# Patient Record
Sex: MALE | Race: WHITE | NOT HISPANIC OR LATINO | Employment: OTHER | URBAN - NONMETROPOLITAN AREA
[De-identification: names, ages, dates, MRNs, and addresses within clinical notes are randomized per-mention and may not be internally consistent; named-entity substitution may affect disease eponyms.]

---

## 2017-04-05 ENCOUNTER — ALLSCRIPTS OFFICE VISIT (OUTPATIENT)
Dept: FAMILY MEDICINE CLINIC | Facility: CLINIC | Age: 77
End: 2017-04-05
Payer: MEDICARE

## 2017-06-23 ENCOUNTER — APPOINTMENT (OUTPATIENT)
Dept: LAB | Facility: CLINIC | Age: 77
End: 2017-06-23
Payer: MEDICARE

## 2017-06-23 DIAGNOSIS — N52.9 MALE ERECTILE DYSFUNCTION: ICD-10-CM

## 2017-06-23 DIAGNOSIS — C61 MALIGNANT NEOPLASM OF PROSTATE (HCC): ICD-10-CM

## 2017-06-23 LAB — PSA SERPL-MCNC: <0.1 NG/ML (ref 0–4)

## 2017-06-23 PROCEDURE — 36415 COLL VENOUS BLD VENIPUNCTURE: CPT

## 2017-06-23 PROCEDURE — 84153 ASSAY OF PSA TOTAL: CPT

## 2017-07-07 ENCOUNTER — ALLSCRIPTS OFFICE VISIT (OUTPATIENT)
Dept: OTHER | Facility: OTHER | Age: 77
End: 2017-07-07

## 2018-01-13 VITALS
SYSTOLIC BLOOD PRESSURE: 114 MMHG | DIASTOLIC BLOOD PRESSURE: 70 MMHG | WEIGHT: 183 LBS | BODY MASS INDEX: 26.2 KG/M2 | HEART RATE: 72 BPM | HEIGHT: 70 IN

## 2018-06-27 ENCOUNTER — APPOINTMENT (OUTPATIENT)
Dept: LAB | Facility: HOSPITAL | Age: 78
End: 2018-06-27
Attending: UROLOGY
Payer: MEDICARE

## 2018-06-27 DIAGNOSIS — C61 MALIGNANT NEOPLASM OF PROSTATE (HCC): ICD-10-CM

## 2018-06-27 LAB — PSA SERPL-MCNC: <0.1 NG/ML (ref 0–4)

## 2018-06-27 PROCEDURE — 36415 COLL VENOUS BLD VENIPUNCTURE: CPT

## 2018-06-27 PROCEDURE — 84153 ASSAY OF PSA TOTAL: CPT

## 2018-07-07 DIAGNOSIS — C61 MALIGNANT NEOPLASM OF PROSTATE (HCC): ICD-10-CM

## 2018-07-16 ENCOUNTER — OFFICE VISIT (OUTPATIENT)
Dept: UROLOGY | Facility: CLINIC | Age: 78
End: 2018-07-16
Payer: MEDICARE

## 2018-07-16 VITALS
BODY MASS INDEX: 26.48 KG/M2 | HEART RATE: 66 BPM | DIASTOLIC BLOOD PRESSURE: 74 MMHG | WEIGHT: 185 LBS | HEIGHT: 70 IN | SYSTOLIC BLOOD PRESSURE: 132 MMHG

## 2018-07-16 DIAGNOSIS — C61 PROSTATE CANCER (HCC): Primary | ICD-10-CM

## 2018-07-16 PROCEDURE — 99214 OFFICE O/P EST MOD 30 MIN: CPT | Performed by: UROLOGY

## 2018-07-16 RX ORDER — ATENOLOL 25 MG/1
TABLET ORAL DAILY
COMMUNITY
Start: 2018-07-08 | End: 2021-09-10 | Stop reason: SDUPTHER

## 2018-07-16 RX ORDER — ROSUVASTATIN CALCIUM 5 MG/1
TABLET, COATED ORAL
COMMUNITY
Start: 2018-07-09 | End: 2021-09-10 | Stop reason: SDUPTHER

## 2018-07-16 RX ORDER — ASPIRIN 325 MG
81 TABLET ORAL
COMMUNITY
End: 2022-04-22 | Stop reason: ALTCHOICE

## 2018-07-16 NOTE — PROGRESS NOTES
Referring Physician: Bonifacio Duvall PA-C  A copy of this note was sent to the referring physician  Diagnoses and all orders for this visit:    Prostate cancer ([facility] [address] )  -     PSA, Total Screen; Future    Other orders  -     aspirin 325 mg tablet; Take 325 mg by mouth  -     atenolol (TENORMIN) 25 mg tablet;   -     rosuvastatin (CRESTOR) 5 mg tablet; TAKE 1 TABLET DAILY AT BEDTIME            Assessment and plan:       1  Prostate cancer  -robotic prostatectomy 2009 (Dr Josiephine Kocher)  -JANESSA    Plan continue annual PSA surveillance  Counseling provided on healthy diet and lifestyle choices, particularly with implications for continued prostate cancer survivor ship    Genaro Mathtews MD      Chief Complaint     Prostate cancer follow-up      History of Present Illness     Junior Bowers is a 69 y o  male who returns in follow-up for prostate cancer  He has done very well since his prostatectomy in 2009 with Dr Zehra Stoddard  He has been free of recurrent disease since that time has not required any adjuvant therapy  He has had a good functional response  He describes minimal leakage with heavy activity  Nevertheless he remains heavily active  During the summer he bikes 3 days a week with his wife  He has no hematuria or UTIs or dysuria  Detailed Urologic History     - please refer to HPI    Review of Systems     Review of Systems   Constitutional: Negative for activity change and fatigue  HENT: Negative for congestion  Eyes: Negative for visual disturbance  Respiratory: Negative for shortness of breath and wheezing  Cardiovascular: Negative for chest pain and leg swelling  Gastrointestinal: Negative for abdominal pain  Endocrine: Negative for polyuria  Genitourinary: Negative for dysuria, flank pain, hematuria and urgency  Mild stress urinary incontinence   Musculoskeletal: Negative for back pain  Allergic/Immunologic: Negative for immunocompromised state     Neurological: Negative for dizziness and numbness  Psychiatric/Behavioral: Negative for dysphoric mood  All other systems reviewed and are negative  AUA SYMPTOM SCORE      Most Recent Value   AUA SYMPTOM SCORE   How often have you had a sensation of not emptying your bladder completely after you finished urinating? 0   How often have you had to urinate again less than two hours after you finished urinating? 0   How often have you found you stopped and started again several times when you urinate?  0   How often have you found it difficult to postpone urination? 0   How often have you had a weak urinary stream?  0   How often have you had to push or strain to begin urination? 0   How many times did you most typically get up to urinate from the time you went to bed at night until the time you got up in the morning? 2   Quality of Life: If you were to spend the rest of your life with your urinary condition just the way it is now, how would you feel about that?  2   AUA SYMPTOM SCORE  2            Allergies     No Known Allergies    Physical Exam     Physical Exam   Constitutional: He is oriented to person, place, and time  He appears well-developed and well-nourished  No distress  HENT:   Head: Normocephalic and atraumatic  Eyes: EOM are normal    Neck: Normal range of motion  Cardiovascular:   Negative lower extremity edema   Pulmonary/Chest: Effort normal and breath sounds normal    Abdominal: Soft  Genitourinary:   Genitourinary Comments: Negative CVA tenderness   Musculoskeletal: Normal range of motion  Neurological: He is alert and oriented to person, place, and time  Skin: Skin is warm  Psychiatric: He has a normal mood and affect   His behavior is normal            Vital Signs  Vitals:    07/16/18 0912   BP: 132/74   BP Location: Left arm   Patient Position: Sitting   Cuff Size: Adult   Pulse: 66   Weight: 83 9 kg (185 lb)   Height: 5' 10" (1 778 m)         Current Medications       Current Outpatient Prescriptions:     aspirin 325 mg tablet, Take 325 mg by mouth, Disp: , Rfl:     atenolol (TENORMIN) 25 mg tablet, , Disp: , Rfl:     rosuvastatin (CRESTOR) 5 mg tablet, TAKE 1 TABLET DAILY AT BEDTIME, Disp: , Rfl:       Active Problems     There is no problem list on file for this patient  Past Medical History     Past Medical History:   Diagnosis Date    Erectile dysfunction     Heart disease     Prostate cancer ([facility] [address] )          Surgical History     Past Surgical History:   Procedure Laterality Date    CAROTID STENT      HERNIA REPAIR      PROSTATECTOMY           Family History     Family History   Problem Relation Age of Onset    Prostate cancer Father          Social History     Social History     History   Smoking Status    Never Smoker   Smokeless Tobacco    Never Used         Pertinent Lab Values     No results found for: CREATININE    Lab Results   Component Value Date    PSA <0 1 06/27/2018    PSA <0 1 06/23/2017    PSA <0 1 07/07/2016       @RESULTRCNT(1H])@      Pertinent Imaging      - n/a    Portions of the record may have been created with voice recognition software   Occasional wrong word or "sound a like" substitutions may have occurred due to the inherent limitations of voice recognition software   Read the chart carefully and recognize, using context, where substitutions have occurred

## 2018-10-20 ENCOUNTER — APPOINTMENT (EMERGENCY)
Dept: RADIOLOGY | Facility: HOSPITAL | Age: 78
End: 2018-10-20
Payer: MEDICARE

## 2018-10-20 ENCOUNTER — HOSPITAL ENCOUNTER (EMERGENCY)
Facility: HOSPITAL | Age: 78
Discharge: HOME | End: 2018-10-20
Attending: EMERGENCY MEDICINE
Payer: MEDICARE

## 2018-10-20 ENCOUNTER — APPOINTMENT (EMERGENCY)
Dept: CT IMAGING | Facility: HOSPITAL | Age: 78
End: 2018-10-20
Payer: MEDICARE

## 2018-10-20 VITALS
OXYGEN SATURATION: 93 % | SYSTOLIC BLOOD PRESSURE: 134 MMHG | TEMPERATURE: 98 F | DIASTOLIC BLOOD PRESSURE: 68 MMHG | RESPIRATION RATE: 23 BRPM | HEART RATE: 74 BPM

## 2018-10-20 DIAGNOSIS — S29.012A MUSCLE STRAIN OF UPPER BACK: ICD-10-CM
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
## 2018-10-20 DIAGNOSIS — S29.011A CHEST WALL MUSCLE STRAIN: ICD-10-CM

## 2018-10-20 DIAGNOSIS — S60.222A CONTUSION OF LEFT HAND: ICD-10-CM

## 2018-10-20 DIAGNOSIS — S81.811A LACERATION OF RIGHT LOWER LEG: ICD-10-CM

## 2018-10-20 DIAGNOSIS — S09.90XA MINOR HEAD INJURY WITHOUT LOSS OF CONSCIOUSNESS: ICD-10-CM

## 2018-10-20 PROCEDURE — 99284 EMERGENCY DEPT VISIT MOD MDM: CPT

## 2018-10-20 PROCEDURE — 73130 X-RAY EXAM OF HAND: CPT

## 2018-10-20 PROCEDURE — 72125 CT NECK SPINE W/O DYE: CPT

## 2018-10-20 PROCEDURE — 73590 X-RAY EXAM OF LOWER LEG: CPT

## 2018-10-20 PROCEDURE — 71046 X-RAY EXAM CHEST 2 VIEWS: CPT

## 2018-10-20 PROCEDURE — 73110 X-RAY EXAM OF WRIST: CPT

## 2018-10-20 PROCEDURE — 72128 CT CHEST SPINE W/O DYE: CPT

## 2018-10-20 PROCEDURE — 93005 ELECTROCARDIOGRAM TRACING: CPT

## 2018-10-20 PROCEDURE — 70450 CT HEAD/BRAIN W/O DYE: CPT

## 2018-10-20 RX ORDER — LIDOCAINE HYDROCHLORIDE AND EPINEPHRINE 10; 10 MG/ML; UG/ML
5 INJECTION, SOLUTION INFILTRATION; PERINEURAL ONCE
Status: COMPLETED | OUTPATIENT
Start: 2018-10-20 | End: 2018-10-20

## 2018-10-20 RX ORDER — LIDOCAINE 50 MG/G
1 PATCH TOPICAL DAILY PRN
Qty: 6 PATCH | Refills: 0 | Status: SHIPPED | OUTPATIENT
Start: 2018-10-20 | End: 2021-03-10 | Stop reason: ALTCHOICE

## 2018-10-20 RX ORDER — CYCLOBENZAPRINE HCL 10 MG
10 TABLET ORAL ONCE
Status: COMPLETED | OUTPATIENT
Start: 2018-10-20 | End: 2018-10-20

## 2018-10-20 RX ORDER — LIDOCAINE 50 MG/G
1 PATCH TOPICAL ONCE
Status: DISCONTINUED | OUTPATIENT
Start: 2018-10-20 | End: 2018-10-20 | Stop reason: HOSPADM

## 2018-10-20 RX ORDER — BACITRACIN, NEOMYCIN, POLYMYXIN B 400; 3.5; 5 [USP'U]/G; MG/G; [USP'U]/G
1 OINTMENT TOPICAL ONCE
Status: COMPLETED | OUTPATIENT
Start: 2018-10-20 | End: 2018-10-20

## 2018-10-20 RX ORDER — CYCLOBENZAPRINE HCL 10 MG
10 TABLET ORAL 3 TIMES DAILY PRN
Qty: 10 TABLET | Refills: 0 | Status: SHIPPED | OUTPATIENT
Start: 2018-10-20 | End: 2018-10-30 | Stop reason: SDUPTHER

## 2018-10-20 RX ORDER — ACETAMINOPHEN 325 MG/1
650 TABLET ORAL ONCE
Status: DISCONTINUED | OUTPATIENT
Start: 2018-10-20 | End: 2018-10-20 | Stop reason: HOSPADM

## 2018-10-20 RX ADMIN — LIDOCAINE 1 PATCH: 50 PATCH CUTANEOUS at 17:00

## 2018-10-20 RX ADMIN — CYCLOBENZAPRINE HYDROCHLORIDE 10 MG: 10 TABLET, FILM COATED ORAL at 17:00

## 2018-10-20 RX ADMIN — NEOMYCIN AND POLYMYXIN B SULFATES AND BACITRACIN ZINC 1 SMALL APPLICATION: 400; 3.5; 5 OINTMENT TOPICAL at 18:36

## 2018-10-20 RX ADMIN — LIDOCAINE HYDROCHLORIDE AND EPINEPHRINE 5 ML: 10; 10 INJECTION, SOLUTION INFILTRATION; PERINEURAL at 17:02

## 2018-10-20 NOTE — ED PROVIDER NOTES
History  Chief Complaint   Patient presents with    Fall     pt reports falling head first into a creeek after falling 8 feet  lower neck pain and upper back pain and chest pain  pt is on 325mg  ASA everyday  Patient is a 69year old male with past medical history of coronary artery disease status post stent, hypertension, hyperlipidemia, prostate cancer status post prostatectomy, presents to the emergency department after he fell from 8 feet off of a diving board  Patient reports he was working outside and was standing on the diving board when it broke and he fell about 8 feet into a creek  Patient states he fell head 1st and did strike the back of his head  He denies loss of consciousness  Bystanders helped him up immediately  He reports having pain in his lower neck/upper back since this started which is worsened when he tries to bend forward  He also reports several hours after the fall which occurred earlier today, he was experiencing some pain all across his chest and feels as though it might be a muscle strain  He also reports sustaining a laceration that bled heavily on his right anterior shin  Bleeding has since been controlled  Denies significant pain under the laceration and denies any extremity weakness or paresthesia  He also reports some mild abrasions on his scalp and on his left hand  He took 3 ibuprofen prior to coming to the ED  He normally takes aspirin 324 mg daily  He denies being on any other blood thinners or anticoagulant agents  Rest of review of systems is negative    Denies fever, shaking chills, headache, dizziness or near syncope, change in vision or hearing, tinnitus, photophobia, recent URI symptoms, cough, hemoptysis, palpitations, dyspnea, pain with inhalation, abdominal pain or distention, nausea, vomiting, change in bowel habits, blood per rectum or melena, dysuria, frequency, hematuria or difficulty urinating, flank pain, low back pain, skin rash or color change, extremity weakness or paresthesia or other focal neurologic deficits  Patient up-to-date with tetanus  History provided by:  Patient   used: No    Fall   Associated symptoms: back pain, chest pain and neck pain    Associated symptoms: no abdominal pain, no headaches, no hearing loss, no nausea, no seizures and no vomiting        Prior to Admission Medications   Prescriptions Last Dose Informant Patient Reported? Taking?   aspirin 325 mg tablet  Self Yes No   Sig: Take 325 mg by mouth   atenolol (TENORMIN) 25 mg tablet  Self Yes No   rosuvastatin (CRESTOR) 5 mg tablet  Self Yes No   Sig: TAKE 1 TABLET DAILY AT BEDTIME      Facility-Administered Medications: None       Past Medical History:   Diagnosis Date    Erectile dysfunction     Heart disease     Prostate cancer ([facility] [address] )        Past Surgical History:   Procedure Laterality Date    CAROTID STENT      HERNIA REPAIR      PROSTATECTOMY         Family History   Problem Relation Age of Onset    Prostate cancer Father      I have reviewed and agree with the history as documented  Social History   Substance Use Topics    Smoking status: Never Smoker    Smokeless tobacco: Never Used    Alcohol use No        Review of Systems   Constitutional: Negative for appetite change, chills, fatigue and fever  HENT: Negative for congestion, ear pain, hearing loss, rhinorrhea, sore throat and tinnitus  Eyes: Negative for photophobia, pain and visual disturbance  Respiratory: Negative for cough, chest tightness, shortness of breath and wheezing  Cardiovascular: Positive for chest pain  Negative for palpitations  Gastrointestinal: Negative for abdominal pain, constipation, diarrhea, nausea and vomiting  Genitourinary: Negative for difficulty urinating, dysuria, flank pain, frequency and hematuria  Musculoskeletal: Positive for back pain and neck pain  Negative for neck stiffness  Skin: Positive for wound   Negative for color change, pallor and rash  Allergic/Immunologic: Negative for immunocompromised state  Neurological: Negative for dizziness, seizures, syncope, facial asymmetry, speech difficulty, weakness, light-headedness, numbness and headaches  Hematological: Negative for adenopathy  Bruises/bleeds easily  Psychiatric/Behavioral: Negative for confusion and decreased concentration  All other systems reviewed and are negative  Physical Exam  Physical Exam   Constitutional: He is oriented to person, place, and time  He appears well-developed and well-nourished  No distress  HENT:   Head: Normocephalic  Right Ear: External ear normal    Left Ear: External ear normal    Nose: Nose normal    Mouth/Throat: Oropharynx is clear and moist  No oropharyngeal exudate  Superficial abrasion on the top of the scalp  There is a small to moderate size hematoma on the left parietal/occipital scalp  No scalp laceration  Eyes: Pupils are equal, round, and reactive to light  Conjunctivae and EOM are normal    Neck: Neck supple  No JVD present  No tracheal deviation present  Decreased range of motion with head turning to the left secondary to pain in his neck and upper back  Otherwise range of motion of the neck intact  Cardiovascular: Normal rate, regular rhythm, normal heart sounds and intact distal pulses  Exam reveals no gallop and no friction rub  No murmur heard  Pulmonary/Chest: Effort normal and breath sounds normal  No respiratory distress  He has no wheezes  He has no rales  He exhibits tenderness  Mild central/left lower chest wall tenderness anteriorly  No chest wall crepitus  Chest wall stable to compression  Abdominal: Soft  Bowel sounds are normal  He exhibits no distension  There is no tenderness  There is no rebound and no guarding  Musculoskeletal: Normal range of motion  He exhibits tenderness  He exhibits no edema  SPINE: + Midline lower C-spine and upper T-spine tenderness    No step-offs  No lumbar spine tenderness  No significant paravertebral muscle tenderness  LEFT UPPER EXTREMITY:  Mild tenderness over the base of the left thumb  There is superficial abrasion over the thumb and thenar eminence  No significant tenderness over the snuffbox and no pain with axial loading of the thumb  RIGHT LOWER EXTREMITY:  Mild tenderness over the anterior and distal tibia around the site of the laceration which is described below (see SKIN exam for laceration details)  Pelvis stable and nontender  All extremities are neurovascularly intact  Neurological: He is alert and oriented to person, place, and time  No cranial nerve deficit  No gross motor or sensory deficits  5/5 strength throughout  Normal gait  Normal speech  Skin: Skin is warm and dry  No rash noted  He is not diaphoretic  No pallor  2 cm vertical and superficial laceration over the right anterior shin  Additional 1 cm above the laceration is abrasion but no significant skin breakdown  No active bleeding  Psychiatric: He has a normal mood and affect  His behavior is normal    Nursing note and vitals reviewed        Vital Signs  ED Triage Vitals [10/20/18 1610]   Temperature Pulse Respirations Blood Pressure SpO2   98 °F (36 7 °C) 78 16 (!) 175/83 97 %      Temp Source Heart Rate Source Patient Position - Orthostatic VS BP Location FiO2 (%)   Oral Monitor Lying -- --      Pain Score       --         Vitals:    10/20/18 1610 10/20/18 1613 10/20/18 1652 10/20/18 1744   BP: (!) 175/83 (!) 179/77 (!) 175/79 134/68   Pulse: 78 82 71 73   Resp: 16 16 16 16   Temp: 98 °F (36 7 °C)      TempSrc: Oral      SpO2: 97% 93% 96% 94%     Visual Acuity      ED Medications  Medications   acetaminophen (TYLENOL) tablet 650 mg (0 mg Oral Hold 10/20/18 1653)   lidocaine (LIDODERM) 5 % patch 1 patch (1 patch Topical Medication Applied 10/20/18 1700)   neomycin-bacitracin-polymyxin b (NEOSPORIN) ointment 1 small application (not administered)   cyclobenzaprine (FLEXERIL) tablet 10 mg (10 mg Oral Given 10/20/18 1700)   lidocaine-epinephrine (XYLOCAINE/EPINEPHRINE) 1 %-1:100,000 injection 5 mL (5 mL Infiltration Given 10/20/18 1702)       Diagnostic Studies  Results Reviewed     None                 CT head without contrast   Final Result by Yeison Junior MD (10/20 1744)      No acute intracranial abnormality  Workstation performed: [id number]         CT cervical spine without contrast   Final Result by Yeison Junior MD (10/20 1744)      No cervical spine fracture or traumatic malalignment  Workstation performed: [id number]         XR hand 3+ vw left   ED Interpretation by Nam Clemens DO (10/20 1741)   No acute osseous abnormality  XR tibia fibula 2 views RIGHT   ED Interpretation by Nam Clemens DO (10/20 1741)   No acute osseous abnormality  No radiopaque foreign body  XR wrist 3+ views LEFT   ED Interpretation by Nam Clemens DO (10/20 1741)   No acute osseous abnormality  XR chest 2 views   ED Interpretation by Nam Clemens DO (10/20 1741)   No acute abnormality in the chest       CT thoracic spine without contrast   Final Result by Yeison Junior MD (10/20 1738)      No acute fracture              Workstation performed: [id number]                    Procedures  ECG 12 Lead Documentation  Date/Time: 10/20/2018 5:22 PM  Performed by: Omar Moore by: Michelle Liu     ECG reviewed by me, the ED Provider: yes    Patient location:  ED  Previous ECG:     Previous ECG:  Compared to current    Comparison ECG info:  8-    Similarity:  No change  Rate:     ECG rate:  69    ECG rate assessment: normal    Rhythm:     Rhythm: sinus rhythm    Ectopy:     Ectopy: none    QRS:     QRS axis:  Normal    QRS intervals:  Normal  Conduction:     Conduction: normal    ST segments:     ST segments:  Normal  T waves:     T waves: normal      Lac Repair  Date/Time: 10/20/2018 6:14 PM  Performed by: Jay Denney by: Vane Lynch   Consent: Verbal consent obtained  Risks and benefits: risks, benefits and alternatives were discussed  Consent given by: patient  Patient understanding: patient states understanding of the procedure being performed  Radiology Images displayed and confirmed  If images not available, report reviewed: imaging studies available  Patient identity confirmed: verbally with patient  Body area: lower extremity  Location details: right lower leg  Laceration length: 1 5 cm  Foreign bodies: no foreign bodies  Tendon involvement: none  Nerve involvement: none  Vascular damage: no  Anesthesia: local infiltration    Anesthesia:  Local Anesthetic: lidocaine 1% with epinephrine  Anesthetic total: 2 mL    Sedation:  Patient sedated: no      Procedure Details:  Preparation: Patient was prepped and draped in the usual sterile fashion  Irrigation solution: saline  Irrigation method: jet lavage  Amount of cleaning: standard  Debridement: none  Degree of undermining: none  Skin closure: 4-0 nylon  Number of sutures: 4  Technique: simple  Approximation: loose  Approximation difficulty: simple  Dressing: antibiotic ointment, 4x4 sterile gauze and gauze roll  Patient tolerance: Patient tolerated the procedure well with no immediate complications             Phone Contacts  ED Phone Contact    ED Course  ED Course as of Oct 20 1819   Sat Oct 20, 2018   1819 Patient updated of normal x-rays and CT scans  Wound repaired using sutures  Advised him to take ibuprofen and/or Aleve as needed for pain  Will prescribe muscle relaxer for muscle strain  Will also prescribe Lidoderm patches  Discussed had a take care of the wound and stitches at home, went to get it wet and when to return for suture removal   Also discussed signs and symptoms of wound infection  ED return precautions regarding head, neck and chest injury also discussed    Patient stable for discharge at this time  MDM  Number of Diagnoses or Management Options  Diagnosis management comments: 64-year-old male on 03/20 4 mg of aspirin daily, presents status post a fall from 8 feet, head 1st into a creek  He does have evidence of head injury and will obtain a CT scan of the head to rule out intracranial bleeding  Will also obtain CT scan of the cervical and thoracic spine given pain and tenderness in this region  Will obtain chest x-ray and EKG for evaluation of chest pain  I do feel the chest pain is likely a muscle strain as it started after the pain in his back started  Will also obtain x-rays of the right tibia/fibula and x-rays of the left hand/wrist   Will give Tylenol, Flexeril for muscle relaxer and Lidoderm patch for symptomatic relief  Will repair right leg laceration with sutures under local anesthesia  Patient already up-to-date with tetanus         Amount and/or Complexity of Data Reviewed  Tests in the radiology section of CPT®: ordered and reviewed  Tests in the medicine section of CPT®: ordered and reviewed  Independent visualization of images, tracings, or specimens: yes      CritCare Time    Disposition  Final diagnoses:   Fall from height of greater than 3 feet   Minor head injury without loss of consciousness   Muscle strain of upper back   Chest wall muscle strain   Laceration of right lower leg   Contusion of left hand     Time reflects when diagnosis was documented in both MDM as applicable and the Disposition within this note     Time User Action Codes Description Comment    10/20/2018  6:16 PM Ramone Shah [address] from height of greater than 3 feet     10/20/2018  6:16 PM Dayo Shah Add [S09 90XA] Minor head injury without loss of consciousness     10/20/2018  6:16 PM Noemi LEWIS Add [S29 012A] Muscle strain of upper back     10/20/2018  6:16 PM Noemi LEWIS Add [S29 011A] Chest wall muscle strain     10/20/2018  6:16 PM Rissa Parker Add [T66 889V] Laceration of right lower leg     10/20/2018  6:16 PM Rissa Parker Add [S60 222A] Contusion of left hand       ED Disposition     ED Disposition Condition Comment    Discharge  Randy Bye discharge to home/self care  Condition at discharge: Stable        Follow-up Information     Follow up With Specialties Details Why Contact Info Additional Information    Tariq Sosa PA-C Family Medicine, Physician Assistant Schedule an appointment as soon as possible for a visit  [address] [facility] [address]  [phone]       [address] [facility] Emergency Department Emergency Medicine Go to in 7-10 days for suture removal or immediately if signs of wound infection develop [address] [facility] [id number] [phone] [place] ED, [address] [place], [place], [facility]          Patient's Medications   Discharge Prescriptions    CYCLOBENZAPRINE (FLEXERIL) 10 MG TABLET    Take 1 tablet (10 mg total) by mouth 3 (three) times a day as needed for muscle spasms       Start Date: 10/20/2018End Date: --       Order Dose: 10 mg       Quantity: 10 tablet    Refills: 0    LIDOCAINE (LIDODERM) 5 %    Apply 1 patch topically daily as needed for mild pain or moderate pain Remove & Discard patch within 12 hours or as directed by MD       Start Date: 10/20/2018End Date: --       Order Dose: 1 patch       Quantity: 6 patch    Refills: 0     No discharge procedures on file      ED Provider  Electronically Signed by           Genny King DO  10/20/18 7998

## 2018-10-22 LAB
ATRIAL RATE: 69 BPM
P AXIS: 54 DEGREES
PR INTERVAL: 172 MS
QRS AXIS: 59 DEGREES
QRSD INTERVAL: 86 MS
QT INTERVAL: 396 MS
QTC INTERVAL: 424 MS
T WAVE AXIS: 37 DEGREES
VENTRICULAR RATE: 69 BPM

## 2018-10-22 PROCEDURE — 93010 ELECTROCARDIOGRAM REPORT: CPT | Performed by: INTERNAL MEDICINE

## 2018-10-24 ENCOUNTER — OFFICE VISIT (OUTPATIENT)
Dept: URGENT CARE | Facility: CLINIC | Age: 78
End: 2018-10-24
Payer: MEDICARE

## 2018-10-24 VITALS
SYSTOLIC BLOOD PRESSURE: 148 MMHG | HEART RATE: 62 BPM | OXYGEN SATURATION: 97 % | DIASTOLIC BLOOD PRESSURE: 82 MMHG | RESPIRATION RATE: 18 BRPM | HEIGHT: 70 IN | TEMPERATURE: 98.1 F | WEIGHT: 187.2 LBS | BODY MASS INDEX: 26.8 KG/M2

## 2018-10-24 DIAGNOSIS — T14.8XXA INFECTED WOUND: Primary | ICD-10-CM

## 2018-10-24 DIAGNOSIS — L08.9 INFECTED WOUND: Primary | ICD-10-CM

## 2018-10-24 PROCEDURE — 99213 OFFICE O/P EST LOW 20 MIN: CPT | Performed by: PHYSICIAN ASSISTANT

## 2018-10-24 PROCEDURE — G0463 HOSPITAL OUTPT CLINIC VISIT: HCPCS | Performed by: PHYSICIAN ASSISTANT

## 2018-10-24 RX ORDER — CEPHALEXIN 500 MG/1
500 CAPSULE ORAL EVERY 8 HOURS SCHEDULED
Qty: 15 CAPSULE | Refills: 0 | Status: SHIPPED | OUTPATIENT
Start: 2018-10-24 | End: 2018-10-29

## 2018-10-24 NOTE — PROGRESS NOTES
[facility]        NAME: Ria Rogers is a 69 y o  male  : 1940    MRN: [id number]  DATE: 2018  TIME: 12:01 PM    Assessment and Plan   Infected wound [T14  8XXA, L08 9]  1  Infected wound  cephalexin (KEFLEX) 500 mg capsule         Patient Instructions      due to erythema and scant drainage we will treat with Keflex for infection  Continue daily cleaning and triple antibiotic ointment or bacitracin over-the-counter  Follow up with PCP in 3-5 days  Proceed to  ER if symptoms worsen  Chief Complaint     Chief Complaint   Patient presents with    Wound Check     R shin  had stitches placed on 10/20 after a fall  reports has been draining more and wants it checked for possible infection  History of Present Illness         67-year-old male presents for wound check on his right shin    3 days ago he was in the emergency room and had sutures placed in his leg due to a fall and a wound  He says he has had some drainage on the dressing and some redness on surrounding area of the wound    No fever or chills        Review of Systems   Review of Systems      Current Medications       Current Outpatient Prescriptions:     aspirin 325 mg tablet, Take 325 mg by mouth, Disp: , Rfl:     atenolol (TENORMIN) 25 mg tablet, , Disp: , Rfl:     cyclobenzaprine (FLEXERIL) 10 mg tablet, Take 1 tablet (10 mg total) by mouth 3 (three) times a day as needed for muscle spasms, Disp: 10 tablet, Rfl: 0    rosuvastatin (CRESTOR) 5 mg tablet, TAKE 1 TABLET DAILY AT BEDTIME, Disp: , Rfl:     cephalexin (KEFLEX) 500 mg capsule, Take 1 capsule (500 mg total) by mouth every 8 (eight) hours for 5 days, Disp: 15 capsule, Rfl: 0    lidocaine (LIDODERM) 5 %, Apply 1 patch topically daily as needed for mild pain or moderate pain Remove & Discard patch within 12 hours or as directed by MD (Patient not taking: Reported on 10/24/2018 ), Disp: 6 patch, Rfl: 0    Current Allergies     Allergies as of 10/24/2018  (No Known Allergies)            The following portions of the patient's history were reviewed and updated as appropriate: allergies, current medications, past family history, past medical history, past social history, past surgical history and problem list      Past Medical History:   Diagnosis Date    Erectile dysfunction     Heart disease     Prostate cancer ([address] )        Past Surgical History:   Procedure Laterality Date    CAROTID STENT      HERNIA REPAIR      PROSTATECTOMY         Family History   Problem Relation Age of Onset    Prostate cancer Father          Medications have been verified  Objective   /82 (BP Location: Left arm, Patient Position: Sitting)   Pulse 62   Temp 98 1 °F (36 7 °C) (Temporal)   Resp 18   Ht 5' 10" (1 778 m)   Wt 84 9 kg (187 lb 3 2 oz)   SpO2 97%   BMI 26 86 kg/m²        Physical Exam     Physical Exam   Constitutional: He appears well-developed and well-nourished  Skin:    Right anterior shin with 4-5 centimeter abrasion and laceration  There is some granulation tissue at the superior portion of the wound  Sutures are in place  He has surrounding erythema induration and tenderness more on the lateral side on the medial side extending to 2 centimeters  No purulence expressed    Mild oozing

## 2018-10-24 NOTE — PATIENT INSTRUCTIONS
due to erythema and scant drainage we will treat with Keflex for infection  Continue daily cleaning and triple antibiotic ointment or bacitracin over-the-counter  Follow up with PCP in 3-5 days  Proceed to  ER if symptoms worsen

## 2018-10-30 ENCOUNTER — OFFICE VISIT (OUTPATIENT)
Dept: URGENT CARE | Facility: CLINIC | Age: 78
End: 2018-10-30
Payer: MEDICARE

## 2018-10-30 VITALS
SYSTOLIC BLOOD PRESSURE: 140 MMHG | HEART RATE: 68 BPM | OXYGEN SATURATION: 97 % | RESPIRATION RATE: 16 BRPM | HEIGHT: 70 IN | BODY MASS INDEX: 26.28 KG/M2 | TEMPERATURE: 98.1 F | DIASTOLIC BLOOD PRESSURE: 76 MMHG | WEIGHT: 183.6 LBS

## 2018-10-30 DIAGNOSIS — S29.011D MUSCLE STRAIN OF CHEST WALL, SUBSEQUENT ENCOUNTER: ICD-10-CM

## 2018-10-30 DIAGNOSIS — S29.012A MUSCLE STRAIN OF UPPER BACK: ICD-10-CM

## 2018-10-30 PROCEDURE — 99211 OFF/OP EST MAY X REQ PHY/QHP: CPT | Performed by: PHYSICIAN ASSISTANT

## 2018-10-30 PROCEDURE — G0463 HOSPITAL OUTPT CLINIC VISIT: HCPCS | Performed by: PHYSICIAN ASSISTANT

## 2018-10-30 RX ORDER — CYCLOBENZAPRINE HCL 10 MG
10 TABLET ORAL 3 TIMES DAILY PRN
Qty: 15 TABLET | Refills: 0 | Status: SHIPPED | OUTPATIENT
Start: 2018-10-30 | End: 2021-03-10 | Stop reason: ALTCHOICE

## 2018-10-30 NOTE — PROGRESS NOTES
[facility]        NAME: Rossana Alexandra is a 69 y o  male  : 1940    MRN: [id number]  DATE: 2018  TIME: 11:05 AM    Assessment and Plan   No primary diagnosis found  1  Muscle strain of upper back  cyclobenzaprine (FLEXERIL) 10 mg tablet   2  Muscle strain of chest wall, subsequent encounter  cyclobenzaprine (FLEXERIL) 10 mg tablet         Patient Instructions       We can refill Flexeril  Keep the wound clean and dry today  Steri-Strips should fall off on their own in 7-10 days  Follow up with PCP in 3-5 days  Proceed to  ER if symptoms worsen  Chief Complaint     Chief Complaint   Patient presents with    Wound Check     R shin wound, was stitched up last Saturday in the ER, thinks the sutures need to come out         History of Present Illness         59-year-old male reports for follow-up on his right lower leg wound  He was placed on antibiotics by me for some concerns for cellulitis  Says it is improving with no drainage  He also complains of some persistent right-sided neck pain that is worse in the morning  He is using Flexeril which does help  He says he is running out of the Flexeril  No headache or vision changes          Review of Systems   Review of Systems      Current Medications       Current Outpatient Prescriptions:     aspirin 325 mg tablet, Take 81 mg by mouth  , Disp: , Rfl:     atenolol (TENORMIN) 25 mg tablet, , Disp: , Rfl:     cyclobenzaprine (FLEXERIL) 10 mg tablet, Take 1 tablet (10 mg total) by mouth 3 (three) times a day as needed for muscle spasms, Disp: 15 tablet, Rfl: 0    lidocaine (LIDODERM) 5 %, Apply 1 patch topically daily as needed for mild pain or moderate pain Remove & Discard patch within 12 hours or as directed by MD (Patient not taking: Reported on 10/24/2018 ), Disp: 6 patch, Rfl: 0    rosuvastatin (CRESTOR) 5 mg tablet, TAKE 1 TABLET DAILY AT BEDTIME, Disp: , Rfl:     Current Allergies     Allergies as of 10/30/2018    (No Known Allergies)            The following portions of the patient's history were reviewed and updated as appropriate: allergies, current medications, past family history, past medical history, past social history, past surgical history and problem list      Past Medical History:   Diagnosis Date    Erectile dysfunction     Heart disease     Prostate cancer ([address] )        Past Surgical History:   Procedure Laterality Date    CAROTID STENT      HERNIA REPAIR      PROSTATECTOMY         Family History   Problem Relation Age of Onset    Prostate cancer Father          Medications have been verified  Objective   /76   Pulse 68   Temp 98 1 °F (36 7 °C) (Temporal)   Resp 16   Ht 5' 10" (1 778 m)   Wt 83 3 kg (183 lb 9 6 oz)   SpO2 97%   BMI 26 34 kg/m²        Physical Exam     Physical Exam   Constitutional: He appears well-developed and well-nourished  Musculoskeletal:     C-spine nontender full range of motion  He has pain and range of bilateral rotation of the neck  Right upper extremity full range of motion no weakness  Skin:     Right anterior shin with wound clean dry and intact with sutures  Improved erythema surrounding  No purulence expressed  Sutures removed and Steri-Strips applied

## 2018-10-30 NOTE — PATIENT INSTRUCTIONS
We can refill Flexeril  Keep the wound clean and dry today  Steri-Strips should fall off on their own in 7-10 days

## 2019-07-08 ENCOUNTER — APPOINTMENT (OUTPATIENT)
Dept: LAB | Facility: HOSPITAL | Age: 79
End: 2019-07-08
Attending: UROLOGY
Payer: MEDICARE

## 2019-07-08 DIAGNOSIS — C61 PROSTATE CANCER (HCC): ICD-10-CM

## 2019-07-08 LAB — PSA SERPL-MCNC: <0.1 NG/ML (ref 0–4)

## 2019-07-08 PROCEDURE — G0103 PSA SCREENING: HCPCS

## 2019-07-08 PROCEDURE — 36415 COLL VENOUS BLD VENIPUNCTURE: CPT

## 2019-07-19 NOTE — PROGRESS NOTES
There are no diagnoses linked to this encounter  Assessment and plan:       1  Prostate cancer status post robotic prostatectomy 2009 - Dr Jennifer Blackmon  - PSA remains undetectable  - Patient has no lower urinary tract concerns today  He is not interested in treatment for his erectile dysfunction as his wife is not interested in sexual activity   - He will return in 1 year with PSA prior  Esperanza Lyons PA-C      Chief Complaint     Chief Complaint   Patient presents with    Prostate Cancer         History of Present Illness     Carlotta Avilez is a 67 y o  male patient known to Dr Jennifer Blackmon for history of prostate cancer status post prostatectomy in 2009  Patient has been free of recurrent disease since that time and has not required any adjuvant therapy  Patient continues to deny bothersome incontinence  He does describe minimal leakage with heavy activity but remains highly active  Most recent PSA drawn 07/08/2019 remains undetectable  Laboratory     No results found for: CREATININE    Lab Results   Component Value Date    PSA <0 1 07/08/2019    PSA <0 1 06/27/2018    PSA <0 1 06/23/2017       No results found for this or any previous visit (from the past 1 hour(s))  Review of Systems     Review of Systems   Constitutional: Negative for chills and fever  Respiratory: Negative for shortness of breath  Cardiovascular: Negative for chest pain  Gastrointestinal: Negative for constipation, diarrhea, nausea and vomiting  Genitourinary: Negative for difficulty urinating, dysuria, enuresis, flank pain, frequency, hematuria and urgency  Allergies     No Known Allergies    Physical Exam     Physical Exam   Constitutional: He is oriented to person, place, and time  He appears well-developed and well-nourished  No distress  Appears younger than stated age   HENT:   Head: Normocephalic and atraumatic     Right Ear: External ear normal    Left Ear: External ear normal  Nose: Nose normal    Eyes: Right eye exhibits no discharge  Left eye exhibits no discharge  No scleral icterus  Cardiovascular: Normal rate  Pulmonary/Chest: Effort normal    Musculoskeletal:   Ambulates independently   Neurological: He is alert and oriented to person, place, and time  Skin: Skin is warm and dry  He is not diaphoretic  Psychiatric: He has a normal mood and affect  His behavior is normal  Judgment and thought content normal    Nursing note and vitals reviewed          Vital Signs     Vitals:    07/24/19 0950   BP: 120/84   BP Location: Left arm   Patient Position: Sitting   Cuff Size: Standard   Pulse: 61   Weight: 80 6 kg (177 lb 9 6 oz)   Height: 5' 10" (1 778 m)         Current Medications       Current Outpatient Medications:     aspirin 325 mg tablet, Take 81 mg by mouth  , Disp: , Rfl:     atenolol (TENORMIN) 25 mg tablet, , Disp: , Rfl:     rosuvastatin (CRESTOR) 5 mg tablet, TAKE 1 TABLET DAILY AT BEDTIME, Disp: , Rfl:     cyclobenzaprine (FLEXERIL) 10 mg tablet, Take 1 tablet (10 mg total) by mouth 3 (three) times a day as needed for muscle spasms (Patient not taking: Reported on 7/24/2019), Disp: 15 tablet, Rfl: 0    lidocaine (LIDODERM) 5 %, Apply 1 patch topically daily as needed for mild pain or moderate pain Remove & Discard patch within 12 hours or as directed by MD (Patient not taking: Reported on 7/24/2019), Disp: 6 patch, Rfl: 0      Active Problems     Patient Active Problem List   Diagnosis    Prostate cancer [facility])         Past Medical History     Past Medical History:   Diagnosis Date    Erectile dysfunction     Heart disease     [address] [address]          Surgical History     Past Surgical History:   Procedure Laterality Date    CAROTID STENT      HERNIA REPAIR      PROSTATECTOMY           Family History     Family History   Problem Relation Age of Onset    Prostate cancer Father          Social History     Social History       Radiology

## 2019-07-24 ENCOUNTER — OFFICE VISIT (OUTPATIENT)
Dept: UROLOGY | Facility: CLINIC | Age: 79
End: 2019-07-24
Payer: MEDICARE

## 2019-07-24 VITALS
DIASTOLIC BLOOD PRESSURE: 84 MMHG | SYSTOLIC BLOOD PRESSURE: 120 MMHG | BODY MASS INDEX: 25.43 KG/M2 | HEART RATE: 61 BPM | WEIGHT: 177.6 LBS | HEIGHT: 70 IN

## 2019-07-24 DIAGNOSIS — C61 PROSTATE CANCER (HCC): Primary | ICD-10-CM

## 2019-07-24 PROCEDURE — 99213 OFFICE O/P EST LOW 20 MIN: CPT | Performed by: PHYSICIAN ASSISTANT

## 2020-03-02 ENCOUNTER — OFFICE VISIT (OUTPATIENT)
Dept: GERIATRICS | Facility: OTHER | Age: 80
End: 2020-03-02

## 2020-03-02 VITALS
WEIGHT: 169 LBS | HEART RATE: 67 BPM | BODY MASS INDEX: 25.03 KG/M2 | RESPIRATION RATE: 16 BRPM | HEIGHT: 69 IN | OXYGEN SATURATION: 95 % | SYSTOLIC BLOOD PRESSURE: 128 MMHG | DIASTOLIC BLOOD PRESSURE: 64 MMHG

## 2020-03-02 DIAGNOSIS — E78.2 MIXED HYPERLIPIDEMIA: ICD-10-CM

## 2020-03-02 DIAGNOSIS — Z00.8 HEALTH EXAMINATION IN POPULATION SURVEY: Primary | ICD-10-CM

## 2020-03-02 DIAGNOSIS — I10 ESSENTIAL HYPERTENSION: ICD-10-CM

## 2020-03-02 DIAGNOSIS — C61 PROSTATE CANCER (HCC): ICD-10-CM

## 2020-03-02 PROCEDURE — 99318 PR E/M ANNUAL NURSING FACILITY ASSESS STABLE 30 MIN: CPT | Performed by: NURSE PRACTITIONER

## 2020-03-02 NOTE — PROGRESS NOTES
Assessment/Plan:     Problem List Items Addressed This Visit        Cardiovascular and Mediastinum    Essential hypertension     - Well controlled  - Continue atenolol            Genitourinary    Prostate cancer ([facility] [address] ) - Primary     - S/P robotic surgery with Dr Magaly Ulloa 10 years ago, June 2009  - PSA level stable   - Follow-up with Urology            Other    Mixed hyperlipidemia     - Stable  - Continue Crestor 5 mg daily     Health examination in population survey:  Primary  - MOCA score 26/30  - He is fully independent with ADL's, ambulation, finances and medical management  No recent falls or hospitalizations  - Recommendation is for life care at  [facility] [place] [facility]     Patient ID: Mic Girard is a 79 y o  male  79year old male presented to office for a life care assessment for [facility]  He offered no complaints  He has a history of Prostate Cancer, HLD, Essential HTN that is controlled and being managed by his  Dr Monique El      The following portions of the patient's history were reviewed and updated as appropriate: allergies, current medications, past family history, past medical history, past social history, past surgical history and problem list, with patient and in EMR       Review of Systems   Genitourinary:        Stress incontinence   Psychiatric/Behavioral: Negative for behavioral problems, confusion and dysphoric mood  The patient is not nervous/anxious  Objective:      /64 (BP Location: Right arm, Patient Position: Sitting)   Pulse 67   Resp 16   Ht 5' 9 25" (1 759 m)   Wt 76 7 kg (169 lb)   SpO2 95%   BMI 24 78 kg/m²          Physical Exam   Constitutional: He is oriented to person, place, and time  He appears well-developed and well-nourished  No distress  Neck: Normal range of motion  Neck supple  Cardiovascular: Normal rate and regular rhythm   Exam reveals no gallop and no friction rub  No murmur heard  Pulmonary/Chest: Effort normal and breath sounds normal  No stridor  No respiratory distress  He has no wheezes  He has no rales  He exhibits no tenderness  Musculoskeletal: Normal range of motion  Neurological: He is alert and oriented to person, place, and time  No cranial nerve deficit or sensory deficit  Skin: Skin is warm and dry  No rash noted  He is not diaphoretic  No erythema  No pallor  Psychiatric: He has a normal mood and affect   His behavior is normal  Judgment normal

## 2020-03-02 NOTE — ASSESSMENT & PLAN NOTE
- S/P robotic surgery with Dr Krista Parkinson 10 years ago, June 2009  - PSA level stable   - Follow-up with Urology

## 2020-03-02 NOTE — ASSESSMENT & PLAN NOTE
- MOCA score 26/30  - He is fully independent with ADL's, ambulation, finances and medical management   No recent falls or hospitalizations  - Recommendation is for life care at [facility]

## 2020-07-17 ENCOUNTER — APPOINTMENT (OUTPATIENT)
Dept: LAB | Facility: CLINIC | Age: 80
End: 2020-07-17
Payer: MEDICARE

## 2020-07-17 DIAGNOSIS — C61 PROSTATE CANCER (HCC): ICD-10-CM

## 2020-07-17 LAB — PSA SERPL-MCNC: <0.1 NG/ML (ref 0–4)

## 2020-07-17 PROCEDURE — 84153 ASSAY OF PSA TOTAL: CPT

## 2020-07-27 NOTE — PROGRESS NOTES
Assessment and plan:       1  Prostate cancer status post robotic prostatectomy 2009   - PSA remains undetectable - 7/17/2020  - Patient has no lower urinary tract concerns today  He is not interested in treatment for his erectile dysfunction as his wife is not interested in sexual activity   - He will return in 1 year with PSA prior  Dimple Wallace PA-C      Chief Complaint     Chief Complaint   Patient presents with    Prostate Cancer         History of Present Illness     Denver Dow is a 79 y o  male patient previously managed by Dr Shaheen Power for history of prostate cancer status post prostatectomy in 2009  Patient has been free of recurrent disease since that time and has not required any adjuvant therapy  Patient continues to deny bothersome incontinence  He does describe minimal leakage with heavy activity but remains highly active      Most recent PSA drawn 07/17/2020 remains undetectable  He is feeling well and has no complaints today  Laboratory     No results found for: CREATININE    Lab Results   Component Value Date    PSA <0 1 07/17/2020    PSA <0 1 07/08/2019    PSA <0 1 06/27/2018       No results found for this or any previous visit (from the past 1 hour(s))  Review of Systems     Review of Systems   Constitutional: Negative for chills and fever  HENT: Negative  Eyes: Negative  Respiratory: Negative for cough and shortness of breath  Cardiovascular: Negative for chest pain  Gastrointestinal: Negative for constipation, diarrhea, nausea and vomiting  Endocrine: Negative  Genitourinary: Negative for difficulty urinating, dysuria, enuresis, flank pain, frequency, hematuria and urgency  Musculoskeletal: Negative  Skin: Negative  Allergies     No Known Allergies    Physical Exam     Physical Exam   Constitutional: He is oriented to person, place, and time  He appears well-developed and well-nourished  No distress     HENT:   Head: Normocephalic and atraumatic  Right Ear: External ear normal    Left Ear: External ear normal    Nose: Nose normal    Eyes: Right eye exhibits no discharge  Left eye exhibits no discharge  No scleral icterus  Cardiovascular: Normal rate  Pulmonary/Chest: Effort normal    Musculoskeletal:   Ambulates independently   Neurological: He is alert and oriented to person, place, and time  Skin: Skin is warm and dry  He is not diaphoretic  Psychiatric: He has a normal mood and affect  His behavior is normal  Judgment and thought content normal    Nursing note and vitals reviewed          Vital Signs     Vitals:    07/30/20 1303   BP: 120/66   BP Location: Left arm   Patient Position: Sitting   Cuff Size: Adult   Pulse: 63   Temp: 97 9 °F (36 6 °C)   Weight: 77 1 kg (170 lb)   Height: 5' 9 25" (1 759 m)         Current Medications       Current Outpatient Medications:     aspirin 325 mg tablet, Take 81 mg by mouth  , Disp: , Rfl:     atenolol (TENORMIN) 25 mg tablet, , Disp: , Rfl:     Calcium-Magnesium-Vitamin D (CALCIUM 500 PO), Take by mouth daily, Disp: , Rfl:     CRANBERRY PO, Take by mouth daily, Disp: , Rfl:     niacin 100 mg tablet, Take 100 mg by mouth daily with breakfast, Disp: , Rfl:     rosuvastatin (CRESTOR) 5 mg tablet, TAKE 1 TABLET DAILY AT BEDTIME, Disp: , Rfl:     cyclobenzaprine (FLEXERIL) 10 mg tablet, Take 1 tablet (10 mg total) by mouth 3 (three) times a day as needed for muscle spasms (Patient not taking: Reported on 7/24/2019), Disp: 15 tablet, Rfl: 0    lidocaine (LIDODERM) 5 %, Apply 1 patch topically daily as needed for mild pain or moderate pain Remove & Discard patch within 12 hours or as directed by MD (Patient not taking: Reported on 7/24/2019), Disp: 6 patch, Rfl: 0      Active Problems     Patient Active Problem List   Diagnosis    Pro[facility] [address] [address]    Essential hypertension    Mixed hyperlipidemia    Health examination in population survey         Past Medical History     Past Medical History:   Diagnosis Date    Erectile dysfunction     Heart disease     Prostate cancer [facility])          Surgical History     Past Surgical History:   Procedure Laterality Date    CAROTID STENT      HERNIA REPAIR      PROSTATECTOMY           Family History     Family History   Problem Relation Age of Onset    Prostate cancer Father          Social History     Social History       Radiology

## 2020-07-30 ENCOUNTER — OFFICE VISIT (OUTPATIENT)
Dept: UROLOGY | Facility: CLINIC | Age: 80
End: 2020-07-30
Payer: MEDICARE

## 2020-07-30 VITALS
SYSTOLIC BLOOD PRESSURE: 120 MMHG | HEIGHT: 69 IN | WEIGHT: 170 LBS | BODY MASS INDEX: 25.18 KG/M2 | HEART RATE: 63 BPM | TEMPERATURE: 97.9 F | DIASTOLIC BLOOD PRESSURE: 66 MMHG

## 2020-07-30 DIAGNOSIS — C61 PROSTATE CANCER (HCC): Primary | ICD-10-CM

## 2020-07-30 PROCEDURE — 99213 OFFICE O/P EST LOW 20 MIN: CPT | Performed by: PHYSICIAN ASSISTANT

## 2020-07-30 RX ORDER — NIACIN 100 MG
250 TABLET ORAL
COMMUNITY
End: 2022-04-22 | Stop reason: ALTCHOICE

## 2020-10-13 ENCOUNTER — IMMUNIZATIONS (OUTPATIENT)
Dept: GERIATRICS | Facility: OTHER | Age: 80
End: 2020-10-13
Payer: MEDICARE

## 2020-10-13 DIAGNOSIS — Z23 ENCOUNTER FOR IMMUNIZATION: ICD-10-CM

## 2020-10-13 PROCEDURE — G0008 ADMIN INFLUENZA VIRUS VAC: HCPCS | Performed by: FAMILY MEDICINE

## 2020-10-13 PROCEDURE — 90662 IIV NO PRSV INCREASED AG IM: CPT | Performed by: FAMILY MEDICINE

## 2021-02-12 DIAGNOSIS — Z23 ENCOUNTER FOR IMMUNIZATION: ICD-10-CM

## 2021-03-10 ENCOUNTER — OFFICE VISIT (OUTPATIENT)
Dept: FAMILY MEDICINE CLINIC | Facility: CLINIC | Age: 81
End: 2021-03-10
Payer: MEDICARE

## 2021-03-10 VITALS
HEIGHT: 70 IN | SYSTOLIC BLOOD PRESSURE: 130 MMHG | TEMPERATURE: 97.8 F | RESPIRATION RATE: 14 BRPM | BODY MASS INDEX: 25.27 KG/M2 | HEART RATE: 74 BPM | DIASTOLIC BLOOD PRESSURE: 72 MMHG | WEIGHT: 176.5 LBS

## 2021-03-10 DIAGNOSIS — M54.32 SCIATIC PAIN, LEFT: ICD-10-CM

## 2021-03-10 DIAGNOSIS — I10 ESSENTIAL HYPERTENSION: Primary | ICD-10-CM

## 2021-03-10 DIAGNOSIS — M25.552 LEFT HIP PAIN: ICD-10-CM

## 2021-03-10 DIAGNOSIS — C61 PROSTATE CANCER (HCC): ICD-10-CM

## 2021-03-10 DIAGNOSIS — I25.10 CORONARY ARTERY DISEASE INVOLVING NATIVE CORONARY ARTERY OF NATIVE HEART WITHOUT ANGINA PECTORIS: ICD-10-CM

## 2021-03-10 DIAGNOSIS — R09.82 POSTNASAL DRIP: ICD-10-CM

## 2021-03-10 DIAGNOSIS — E78.2 MIXED HYPERLIPIDEMIA: ICD-10-CM

## 2021-03-10 PROCEDURE — 99204 OFFICE O/P NEW MOD 45 MIN: CPT | Performed by: FAMILY MEDICINE

## 2021-03-10 RX ORDER — GLUCOSAMINE/D3/BOSWELLIA SERRA 1500MG-400
1 TABLET ORAL
COMMUNITY
End: 2022-03-10 | Stop reason: ALTCHOICE

## 2021-03-10 RX ORDER — LANOLIN ALCOHOL/MO/W.PET/CERES
1 CREAM (GRAM) TOPICAL 2 TIMES DAILY WITH MEALS
COMMUNITY

## 2021-03-10 RX ORDER — CHLORAL HYDRATE 500 MG
1000 CAPSULE ORAL DAILY
COMMUNITY

## 2021-03-10 RX ORDER — LORAZEPAM 0.5 MG
1 TABLET ORAL
COMMUNITY

## 2021-03-10 RX ORDER — MELATONIN
1000 2 TIMES DAILY
COMMUNITY

## 2021-03-10 RX ORDER — MULTIVIT WITH MINERALS/LUTEIN
1000 TABLET ORAL
COMMUNITY
End: 2022-03-10 | Stop reason: ALTCHOICE

## 2021-03-10 NOTE — ASSESSMENT & PLAN NOTE
Follows with cardiology, currently on full dose aspirin, beta blocker and statin  Recommended patient decrease dose to 81 mg of aspirin  His cardiologist has been on full-dose aspirin, previous PCP reportedly did not agree with full-dose aspirin as well  He is currently asymptomatic, continue current management

## 2021-03-10 NOTE — PROGRESS NOTES
FAMILY MEDICINE NEW PATIENT     Date of Service: 03/10/21  Primary Care Provider:   Julissa Tamayo MD     Name: Ronaldo Garcia       : 1940       Age:81 y o  Sex: male      MRN: [id number]      Chief Complaint:New Patient Visit     ASSESSMENT and PLAN:  Ronaldo Garcia is a [de-identified] y o  male here to establish care with:     Problem List Items Addressed This Visit        Cardiovascular and Mediastinum    Essential hypertension - Primary     BP Readings from Last 3 Encounters:   20 120/66   20 128/64   19 120/84     Controlled, continue atenolol         Relevant Orders    Basic metabolic panel    Coronary artery disease involving native coronary artery of native heart without angina pectoris     Follows with cardiology, currently on full dose aspirin, beta blocker and statin  Recommended patient decrease dose to 81 mg of aspirin  His cardiologist has been on full-dose aspirin, previous PCP reportedly did not agree with full-dose aspirin as well  He is currently asymptomatic, continue current management  Nervous and Auditory    Sciatic pain, left     Patient with left-sided hip and thigh pain starting in the last several weeks  Worse with walking for prolonged periods of time  Pain does improve with use of ibuprofen  Exam overall benign, no obvious findings suggest intra-articular hip pathology or back pathology  Recommended judicious use of ibuprofen, handout FX stretches and exercises given to patient            Genitourinary    Prostate cancer ([facility] [address] )     Stable PSA, follows with urology            Other    Mixed hyperlipidemia    Relevant Orders    Lipid Panel with Direct LDL reflex    Postnasal drip     Advised against use of 1st generation antihistamines especially 2 different 1st generation antihistamines  Recommended regular use of Zyrtec, consider trial Flonase  Also recommended the patient try nasal rinse             Other Visit Diagnoses     Left hip pain SUBJECTIVE:  Gerson Vick is a [de-identified] y o  male with history of hypertension, hyperlipidemia, coronary artery disease, prediabetes, prostate cancer who presents today with a chief complaint of New Patient Visit  HPI     Patient was previously seen by Dr Ezio Gallegos in [facility]  He recently retried  Patient has moved to [facility] in the last year  He loves fishing, is part of a fishing club  He drinks a glass a wine a day  He has history of prostate cancer, follows with urology  S/p robotic prostatectomy in 2009  Hypertension  He is on atenolol 25 mg daily  He sees cardiology, Dr Maikel Pelayo  He had a stent placed in 2002  He was on Plavix for a long time  He is now on aspirin 325 mg per cardiology  Left Leg Pain  He thinks he has developed sciatica  He has leg pain when he stands up and walks  He reports that the pain starts in his hip and radiates down his leg  He thought it was a hip issue at first  The pain is dull  It is not bothersome at rest  He takes ibuprofen, which does help  Post Nasal Drip  He has allergies to grass  Post nasal drip at night  He takes antihistamine at night, then when he wakes up he cannot go back to sleep and will take benadryl  He takes Zyrtec in the beginning of May through June  Gout  He had an episode of gout several years ago  He takes cranberry and tart cherry  Review of Systems   Constitutional: Negative for activity change, appetite change, chills, fatigue and fever  HENT: Positive for postnasal drip  Eyes: Negative for visual disturbance  Respiratory: Negative for shortness of breath  Cardiovascular: Negative for chest pain  Gastrointestinal: Negative for blood in stool and constipation  Genitourinary: Negative for difficulty urinating  Musculoskeletal: Positive for arthralgias  Neurological: Negative for dizziness, light-headedness and headaches  Psychiatric/Behavioral: Positive for sleep disturbance   Negative for dysphoric mood  I have reviewed the patient's PMH, Surgical History, Family History, Social History, Medication List and Allergies        Histories Reviewed and Updated 3/10/2021:  Patient's Medications   New Prescriptions    No medications on file   Previous Medications    ASCORBIC ACID (VITAMIN C) 1000 MG TABLET    Take 1,000 mg by mouth daily with breakfast    ASPIRIN 325 MG TABLET    Take 81 mg by mouth daily with dinner Blood thinner for stent    ATENOLOL (TENORMIN) 25 MG TABLET    daily     BIOTIN 42244 MCG TABS    Take 1 capsule by mouth daily with dinner    CALCIUM CARBONATE-VIT D-MIN (CALCIUM 1200 PO)    Take 1 capsule by mouth daily with breakfast    CHLORPHENIRAMINE-DM 4-30 MG TABS    Take 1 capsule by mouth daily For sinus drainage    CHOLECALCIFEROL (VITAMIN D3) 1,000 UNITS TABLET    Take 1,000 Units by mouth 2 (two) times a day     CO-ENZYME Q-10 30 MG CAPSULE    Take 30 mg by mouth 3 (three) times a day    CRANBERRY PO    Take by mouth daily To prevent gout    DIPHENHYDRAMINE (BENADRYL) 50 MG TABLET    Take 50 mg by mouth daily at bedtime as needed for itching Sinus drainage/sleep aid    GLUCOSAMINE-CHONDROITIN 500-400 MG TABLET    Take 1 tablet by mouth 2 (two) times a day with meals     MISC NATURAL PRODUCTS (TART CHERRY ADVANCED) CAPS    Take 1 capsule by mouth daily with breakfast 1000 mg to prevent gout    NIACIN 100 MG TABLET    Take 250 mg by mouth 2 (two) times a day before lunch and dinner To raise HDL    OMEGA-3 FATTY ACIDS (FISH OIL) 1,000 MG    Take 1,000 mg by mouth daily    ROSUVASTATIN (CRESTOR) 5 MG TABLET    TAKE 1 TABLET DAILY AT BEDTIME    ZINC 50 MG CAPS    Take 1 capsule by mouth daily   Modified Medications    No medications on file   Discontinued Medications    CALCIUM-MAGNESIUM-VITAMIN D (CALCIUM 500 PO)    Take by mouth daily    CYCLOBENZAPRINE (FLEXERIL) 10 MG TABLET    Take 1 tablet (10 mg total) by mouth 3 (three) times a day as needed for muscle spasms    LIDOCAINE (LIDODERM) 5 %    Apply 1 patch topically daily as needed for mild pain or moderate pain Remove & Discard patch within 12 hours or as directed by MD     Allergies   Allergen Reactions    Grass Extracts [Gramineae Pollens] Allergic Rhinitis     Past Medical History:   Diagnosis Date    Erectile dysfunction     Heart disease     Prostate cancer ([facility] [address] )      Past Surgical History:   Procedure Laterality Date    CAROTID STENT      HERNIA REPAIR      PROSTATECTOMY       Social History     Socioeconomic History    Marital status: /Civil Union     Spouse name: Not on file    Number of children: Not on file    Years of education: Not on file    Highest education level: Not on file   Occupational History    Not on file   Social Needs    Financial resource strain: Not on file    Food insecurity     Worry: Not on file     Inability: Not on file   [employer] needs     Medical: Not on file     Non-medical: Not on file   Tobacco Use    Smoking status: Never Smoker    Smokeless tobacco: Never Used   Substance and Sexual Activity    Alcohol use:  Yes     Alcohol/week: 7 0 standard drinks     Types: 7 Glasses of wine per week     Drinks per session: 1 or 2    Drug use: No    Sexual activity: Not on file   Lifestyle    Physical activity     Days per week: Not on file     Minutes per session: Not on file    Stress: Not on file   Relationships    Social connections     Talks on phone: Not on file     Gets together: Not on file     Attends Je[employer]'s witness service: Not on file     Active member of club or organization: Not on file     Attends meetings of clubs or organizations: Not on file     Relationship status: Not on file    Intimate partner violence     Fear of current or ex partner: Not on file     Emotionally abused: Not on file     Physically abused: Not on file     Forced sexual activity: Not on file   Other Topics Concern    Not on file   Social History Narrative    Not on file     Family History Problem Relation Age of Onset    Prostate cancer Father      Immunization History   Administered Date(s) Administered    INFLUENZA 10/15/2017, 10/30/2017, 10/08/2018    Influenza Split High Dose Preservative Free IM 10/08/2018, 10/02/2019    Influenza, high dose seasonal 0 7 mL 10/13/2020    Influenza, seasonal, injectable 10/07/2010    Influenza, seasonal, injectable, preservative free 10/19/2015    Pneumococcal Conjugate 13-Valent 01/26/2015    Pneumococcal Polysaccharide PPV23 12/16/2019    SARS-CoV-2 / COVID-19 mRNA IM (Moderna) 01/14/2021, 02/11/2021    Tdap 12/20/2017    Zoster 10/07/2010     OBJECTIVE:  /72   Pulse 74   Temp 97 8 °F (36 6 °C)   Resp 14   Ht 5' 9 5" (1 765 m)   Wt 80 1 kg (176 lb 8 oz)   BMI 25 69 kg/m²   BP Readings from Last 3 Encounters:   03/10/21 130/72   07/30/20 120/66   03/02/20 128/64      Wt Readings from Last 3 Encounters:   03/10/21 80 1 kg (176 lb 8 oz)   07/30/20 77 1 kg (170 lb)   03/02/20 76 7 kg (169 lb)      Physical Exam  Constitutional:       General: He is not in acute distress  Appearance: Normal appearance  He is normal weight  He is not ill-appearing or diaphoretic  HENT:      Head: Normocephalic and atraumatic  Right Ear: External ear normal       Left Ear: External ear normal    Eyes:      Extraocular Movements: Extraocular movements intact  Conjunctiva/sclera: Conjunctivae normal    Neck:      Musculoskeletal: Normal range of motion and neck supple  Vascular: No carotid bruit  Cardiovascular:      Rate and Rhythm: Normal rate and regular rhythm  Pulses: Normal pulses  Heart sounds: Normal heart sounds  Pulmonary:      Effort: Pulmonary effort is normal  No respiratory distress  Breath sounds: Normal breath sounds  Abdominal:      General: Bowel sounds are normal  There is no distension  Palpations: Abdomen is soft  Tenderness: There is no abdominal tenderness     Musculoskeletal: Normal range of motion  Right hip: He exhibits normal range of motion, normal strength, no tenderness and no bony tenderness  Left hip: He exhibits normal range of motion, normal strength, no tenderness and no bony tenderness  Right lower leg: No edema  Left lower leg: No edema  Comments: Negative straight leg raise bilaterally, negative MORRIS test bilaterally    Skin:     General: Skin is warm and dry  Findings: No erythema or rash  Neurological:      General: No focal deficit present  Mental Status: He is alert and oriented to person, place, and time  Sensory: No sensory deficit  Gait: Gait normal    Psychiatric:         Mood and Affect: Mood normal          Behavior: Behavior normal          BMI Counseling: Body mass index is 25 69 kg/m²  The BMI is above normal  Nutrition recommendations include encouraging healthy choices of fruits and vegetables, moderation in carbohydrate intake, increasing intake of lean protein and reducing intake of saturated and trans fat  Exercise recommendations include moderate physical activity 150 minutes/week and strength training exercises             Kala Silvestre MD

## 2021-03-10 NOTE — ASSESSMENT & PLAN NOTE
Patient with left-sided hip and thigh pain starting in the last several weeks  Worse with walking for prolonged periods of time  Pain does improve with use of ibuprofen  Exam overall benign, no obvious findings suggest intra-articular hip pathology or back pathology    Recommended judicious use of ibuprofen, handout FX stretches and exercises given to patient

## 2021-03-10 NOTE — ASSESSMENT & PLAN NOTE
BP Readings from Last 3 Encounters:   07/30/20 120/66   03/02/20 128/64   07/24/19 120/84     Controlled, continue atenolol

## 2021-03-10 NOTE — PATIENT INSTRUCTIONS
Hip Bursitis Exercises   AMBULATORY CARE:   Hip bursitis exercises  help strengthen the muscles in your hip and keep the joint flexible  Strong muscles can help reduce pain, prevent injury, and keep the joint stable  The exercises can also help increase the range of motion in your hip joint  What you need to know about exercise safety:   · Move slowly and smoothly  Avoid fast or jerky motions  This will help prevent an injury  · Breathe normally  Do not hold your breath  It is important to breathe in and out so you do not tense up during exercise  Tension could prevent you from moving your joint in a full range of motion  · Do the exercises and stretches on both legs  Do this so both hips remain strong and flexible  · Stop if you feel sharp pain or an increase in pain  Contact your healthcare provider or physical therapist  It is normal to feel some discomfort during exercise  Regular exercise will help decrease your discomfort over time  · Warm up before you stretch and exercise  Walk or ride a stationary bike for 5 to 10 minutes  How to do hip stretches: Your healthcare provider or physical therapist will tell you how many times to do each stretch  Do the stretch on both sides before you move to the next stretch  · Standing iliotibial band stretch:  Stand with the leg on your injured side behind your other leg  Bend sideways toward the side that is not injured  Stop when you feel a stretch in your outer hip  Hold for 5 to 10 seconds  Then return to the starting position  · Lying iliotibial band stretch:  Lie on your back  Bend the knee on your injured side toward your chest  Place your hands on the outside of your knee and thigh  Slowly pull the knee across your body  Stop when you feel a stretch in your hip and outer thigh  Hold for 5 to 10 seconds  Return your leg to the starting position  · Hip stretch:  Lie on your back with both legs straight and on the [facility] the knee on your injured side toward your chest until you can reach your lower leg  Place both hands on your shin and pull your knee toward your chest  Hold for 5 to 10 seconds  Return your leg to the starting position  · Knee to chest:  Lie on your back with both knees bent and feet flat on the floor  Bend the knee on your injured side toward your chest until you can reach your lower leg  Place both hands on your shin and pull your knee toward your chest  Hold for 5 to 10 seconds  Return your leg to the starting position  · Internal hip rotator stretch:  You will do this exercise on a table  Lie on your side with the injured hip on top  You may be told to keep a pillow between your thighs  Move the top leg so the foot hangs below the edge of the table  Rotate your hip to raise your foot in the opposite direction of the bottom shoulder  Raise your foot as high as you can so you feel a stretch in the back of your thigh  Hold for 5 seconds  Then slowly lower your foot to the starting position  · External hip rotator stretch:  You will do this exercise on a table  Lie on your side with the injured hip on the bottom  You do not need a pillow between your thighs for this exercise  Move the bottom leg so the foot is off the edge of the table  Rotate your hip to lift the foot in the opposite direction of the bottom shoulder  Raise your foot as high as you can so you feel a stretch in your buttock  Hold for 5 seconds  Then slowly lower your foot to the starting position  · Kneeling hip flexor stretch:  Kneel on your knee on the injured side  Place the foot of your other leg on the floor so the knee is bent  Put both hands on top of your thigh  Keep your back straight and abdominal muscles tight  Lean forward until you feel a stretch in your other thigh  Hold the stretch for 10 seconds  Return to the starting position  How to do hip strengthening exercises:   Your healthcare provider or physical therapist will tell you how many times to do each exercise  Do the exercise on both sides before you move to the next exercise  · Straight leg lift to the side: This may also be called hip abduction  Lie on your side with straight legs, with the injured hip on top  Slowly raise your top leg toward the ceiling as high as you can  Keep your foot pointed  Hold for 5 seconds  Then slowly lower your leg to the starting position  · Inner thigh lift: This may also be called hip adduction  Lie on your side with straight legs, with the injured hip on the bottom  Cross your top leg over your bottom leg  Put the foot of your top leg on the floor in front of you  Raise your bottom leg until it touches the top leg  Hold for 5 seconds  Then slowly lower the leg to the floor  · Clam exercise:  Lie on your side so your injured side is on top  Bend your knees  Keep your heels together during this exercise  Slowly raise your top knee toward the ceiling  Then lower your leg so your knees are together  Call your doctor if:   · You have sharp pain during exercise or at rest     · You have questions or concerns about the stretches or exercises  © Copyright [address] Information is for End User's use only and may not be sold, redistributed or otherwise used for commercial purposes  All illustrations and images included in CareNotes® are the copyrighted property of A D A M , Inc  or [facility] Y[employer]min [employer]   The above information is an  only  It is not intended as medical advice for individual conditions or treatments  Talk to your doctor, nurse or pharmacist before following any medical regimen to see if it is safe and effective for you

## 2021-03-10 NOTE — ASSESSMENT & PLAN NOTE
Advised against use of 1st generation antihistamines especially 2 different 1st generation antihistamines  Recommended regular use of Zyrtec, consider trial Flonase  Also recommended the patient try nasal rinse

## 2021-03-18 NOTE — PROGRESS NOTES
FAMILY MEDICINE PROGRESS NOTE    Date of Service: 21  Primary Care Provider:   Lisandro Hernández MD     Name: Fran Dahl       : 1940       Age:81 y o  Sex: male      MRN: [id number]      Chief Complaint:Follow-up (L leg pain)     ASSESSMENT and PLAN:  Fran Dahl is a [de-identified] y o  male with:     Problem List Items Addressed This Visit        Nervous and Auditory    Sciatic pain, left - Primary    Relevant Medications    diclofenac sodium (VOLTAREN) 50 mg EC tablet    methocarbamol (ROBAXIN) 500 mg tablet    Other Relevant Orders    Ambulatory Referral to Comprehensive Spine Program    XR spine lumbar minimum 4 views non injury        Patient with persistent leg pain, likely sciatic pain  Unclear if from back or piriformis syndrome, exam not convincing of either  Will refer for PT via comprehensive spine program for evaluation and treatment  Will obtain lumbar spine xrays to evaluate for any significant spondylolisthesis, degenerative changes  Trial 1 week of diclofenac and methocarbamol  Discussed that there might be a role for B vitamins in treating acute low back pains with thiamine 50 mg, pyridoxine 50 mg, cyanocobalamin 1 mg twice daily for 7 days  SUBJECTIVE:  Fran Dahl is a [de-identified] y o  male who presents today with a chief complaint of Follow-up (L leg pain)  HPI     Patient was seen as a new patient last week  He did have complaint of left leg pain, this started about 6 weeks ago  He reports that the pain starts in his hip and radiates down his leg  He thought it was a hip issue at first  He tried ibuprofen which was not helpful  He reports that his pain has worsened since his last visit  He cannot walk for prolonged periods of time  Standing for prolonged periods of time and walking is aggravating  He is fine with sitting  Review of Systems   Musculoskeletal: Positive for arthralgias and back pain  I have reviewed the patient's Past Medical History      Current Outpatient Medications:     Ascorbic Acid (vitamin C) 1000 MG tablet, Take 1,000 mg by mouth daily with breakfast, Disp: , Rfl:     aspirin 325 mg tablet, Take 81 mg by mouth daily with dinner Blood thinner for stent, Disp: , Rfl:     atenolol (TENORMIN) 25 mg tablet, daily , Disp: , Rfl:     Biotin 31544 MCG TABS, Take 1 capsule by mouth daily with dinner, Disp: , Rfl:     Calcium Carbonate-Vit D-Min (CALCIUM 1200 PO), Take 1 capsule by mouth daily with breakfast, Disp: , Rfl:     Chlorpheniramine-DM 4-30 MG TABS, Take 1 capsule by mouth daily For sinus drainage, Disp: , Rfl:     cholecalciferol (VITAMIN D3) 1,000 units tablet, Take 1,000 Units by mouth 2 (two) times a day , Disp: , Rfl:     co-enzyme Q-10 30 MG capsule, Take 30 mg by mouth 3 (three) times a day, Disp: , Rfl:     CRANBERRY PO, Take by mouth daily To prevent gout, Disp: , Rfl:     diphenhydrAMINE (BENADRYL) 50 MG tablet, Take 50 mg by mouth daily at bedtime as needed for itching Sinus drainage/sleep aid, Disp: , Rfl:     glucosamine-chondroitin 500-400 MG tablet, Take 1 tablet by mouth 2 (two) times a day with meals , Disp: , Rfl:     Misc Natural Products (Tart Cherry Advanced) CAPS, Take 1 capsule by mouth daily with breakfast 1000 mg to prevent gout, Disp: , Rfl:     niacin 100 mg tablet, Take 250 mg by mouth 2 (two) times a day before lunch and dinner To raise HDL, Disp: , Rfl:     Omega-3 Fatty Acids (fish oil) 1,000 mg, Take 1,000 mg by mouth daily, Disp: , Rfl:     rosuvastatin (CRESTOR) 5 mg tablet, TAKE 1 TABLET DAILY AT BEDTIME, Disp: , Rfl:     Zinc 50 MG CAPS, Take 1 capsule by mouth daily, Disp: , Rfl:     diclofenac sodium (VOLTAREN) 50 mg EC tablet, Take 1 tablet (50 mg total) by mouth 2 (two) times a day for 7 days, Disp: 14 tablet, Rfl: 0    methocarbamol (ROBAXIN) 500 mg tablet, Take 1 tablet (500 mg total) by mouth 4 (four) times a day as needed for muscle spasms, Disp: 20 tablet, Rfl: 0    OBJECTIVE:  /70   Pulse 78 Temp (!) 96 6 °F (35 9 °C)   Resp 14   Ht 5' 9" (1 753 m)   Wt 80 7 kg (178 lb)   BMI 26 29 kg/m²    BP Readings from Last 3 Encounters:   03/19/21 130/70   03/10/21 130/72   07/30/20 120/66      Wt Readings from Last 3 Encounters:   03/19/21 80 7 kg (178 lb)   03/10/21 80 1 kg (176 lb 8 oz)   07/30/20 77 1 kg (170 lb)      Physical Exam  Constitutional:       General: He is not in acute distress  Appearance: Normal appearance  He is not ill-appearing or toxic-appearing  Neck:      Musculoskeletal: Normal range of motion and neck supple  No neck rigidity  Musculoskeletal:      Right hip: He exhibits normal range of motion, normal strength and no tenderness  Left hip: He exhibits normal range of motion, normal strength and no tenderness  Lumbar back: He exhibits normal range of motion, no tenderness and no bony tenderness  Comments: Negative straight leg raise bilaterally  Negative MORRIS testing on the right, some symptoms in left lower leg on left sided MORRIS with hip externally rotated and abducted   Pain in leg with back extension    Skin:     Findings: No erythema or rash  Neurological:      General: No focal deficit present  Mental Status: He is alert and oriented to person, place, and time  Sensory: Sensation is intact  Motor: Motor function is intact  No weakness, tremor, atrophy, abnormal muscle tone or seizure activity  Psychiatric:         Mood and Affect: Mood normal          Behavior: Behavior normal                 Return if symptoms worsen or fail to improve  Jessy Reddy MD    Note: Portions of the record have been created with voice recognition software  Occasional wrong word or "sound a like" substitutions may have occurred due to the inherent limitations of voice recognition software  Read the chart carefully and recognize, using context, where substitutions have occurred

## 2021-03-19 ENCOUNTER — OFFICE VISIT (OUTPATIENT)
Dept: FAMILY MEDICINE CLINIC | Facility: CLINIC | Age: 81
End: 2021-03-19
Payer: MEDICARE

## 2021-03-19 ENCOUNTER — APPOINTMENT (OUTPATIENT)
Dept: RADIOLOGY | Facility: MEDICAL CENTER | Age: 81
End: 2021-03-19
Payer: MEDICARE

## 2021-03-19 VITALS
RESPIRATION RATE: 14 BRPM | BODY MASS INDEX: 26.36 KG/M2 | SYSTOLIC BLOOD PRESSURE: 130 MMHG | DIASTOLIC BLOOD PRESSURE: 70 MMHG | HEIGHT: 69 IN | TEMPERATURE: 96.6 F | HEART RATE: 78 BPM | WEIGHT: 178 LBS

## 2021-03-19 DIAGNOSIS — M54.32 SCIATIC PAIN, LEFT: Primary | ICD-10-CM

## 2021-03-19 DIAGNOSIS — M54.32 SCIATIC PAIN, LEFT: ICD-10-CM

## 2021-03-19 PROCEDURE — 72110 X-RAY EXAM L-2 SPINE 4/>VWS: CPT

## 2021-03-19 PROCEDURE — 99213 OFFICE O/P EST LOW 20 MIN: CPT | Performed by: FAMILY MEDICINE

## 2021-03-19 RX ORDER — METHOCARBAMOL 500 MG/1
500 TABLET, FILM COATED ORAL 4 TIMES DAILY PRN
Qty: 20 TABLET | Refills: 0 | Status: SHIPPED | OUTPATIENT
Start: 2021-03-19 | End: 2022-04-22 | Stop reason: ALTCHOICE

## 2021-03-23 ENCOUNTER — NURSE TRIAGE (OUTPATIENT)
Dept: PHYSICAL THERAPY | Facility: OTHER | Age: 81
End: 2021-03-23

## 2021-03-23 DIAGNOSIS — M54.42 ACUTE LEFT-SIDED LOW BACK PAIN WITH LEFT-SIDED SCIATICA: Primary | ICD-10-CM

## 2021-03-23 NOTE — TELEPHONE ENCOUNTER
Additional Information   Negative: Is this related to a work injury?  Negative: Is this related to an MVA?  Negative: Are you currently recieving homecare services?  Negative: Has the patient had unexplained weight loss?  Negative: Does the patient have a fever?  Negative: Is the patient experiencing blood in sputum?  Negative: Is the patient experiencing urine retention?  Negative: Is the patient experiencing acute drop foot or paralysis?  Negative: Has the patient experienced major trauma? (fall from height, high speed collision, direct blow to spine) and is also experiencing nausea, light-headedness, or loss of consciousness?  Negative: Is this a chronic condition? Background - Initial Assessment  Clinical complaint: left lower back pain which radiates to the left lower leg  Pain increases with ambulating  States he gets intermittent numbness and tingling in the left lower leg  No history of back problems  Pain started 1/2021 and has gotten progressivley worse over the last month  Date of onset: 1 month  Frequency of pain: intermittent  Quality of pain: aching low back and upper leg and sharp stabbing pain in there lower leg  Protocols used: [facility] COMPREHENSIVE SPINE PROGRAM PROTOCOL    This RN did review in detail the Comprehensive Spine Program and what we can provide for their back pain  Patient is agreeable to being triaged by this RN and would like to proceed with Physical Therapy  Referral was placed for Physical Therapy at the [facility] site  Patients information was sent to the  to make evaluation appointment  Patient made aware that the PT office  will be calling to schedule the appointment  Patient was provided with the phone number to the PT office  No further questions and/or concerns were voiced by the patient at this time  Patient states understanding of the referral that was placed  Referral Closed

## 2021-03-25 ENCOUNTER — EVALUATION (OUTPATIENT)
Dept: PHYSICAL THERAPY | Facility: CLINIC | Age: 81
End: 2021-03-25
Payer: MEDICARE

## 2021-03-25 VITALS — TEMPERATURE: 98.2 F | DIASTOLIC BLOOD PRESSURE: 76 MMHG | SYSTOLIC BLOOD PRESSURE: 138 MMHG | HEART RATE: 64 BPM

## 2021-03-25 DIAGNOSIS — M54.42 ACUTE LEFT-SIDED LOW BACK PAIN WITH LEFT-SIDED SCIATICA: ICD-10-CM

## 2021-03-25 PROCEDURE — 97110 THERAPEUTIC EXERCISES: CPT | Performed by: PHYSICAL THERAPIST

## 2021-03-25 PROCEDURE — 97161 PT EVAL LOW COMPLEX 20 MIN: CPT | Performed by: PHYSICAL THERAPIST

## 2021-03-25 NOTE — PROGRESS NOTES
PT EVALUATION    Today's date: 21  Patient name: Rosa Lemos  : 1940  MRN: [id number]  Referring provider: Vanessa Matthews, PT  Dx:   1  Acute left-sided low back pain with left-sided sciatica        Rosa Lemos is a [de-identified] y o  male who presents with signs and symptoms consistent with their referring diagnosis of acute left low back pain with radiating symptoms in the left leg  Symptoms appear to be along the L4 dermatome  There is a strong flexion bias so stretches will be focused in that direction initially  This patient would benefit from skilled physical therapy to address their listed impairments and functional limitations to maximize functional outcome  Lumbar classification: directional preference    Impairments:    restricted ROM    decreased strength   pain with function   activity intolerance   weight bearing intolerance     Prognosis:  Good  Positive and negative prognostic indicator(s):  none    Goals:    STG Patient is independent with HEP   STG Range of motion is improved by 25% in 2 weeks  LTG Balance & endurance & gait & locomotion are improved by 50% in 4 weeks  LTG ADL performance improved to prior level of function in 6 weeks    Planned interventions:  home exercise program, patient education, manual therapy, graded activity, flexibility, strengthening and abdominal trunk stabilization    Duration in visits:  8  Frequency: 2 visits per week  Duration in weeks:  4    History of Current Injury: Patient reports onset left hip pain over a month ago  He was walking more than a mile a day at onset  He has stopped walking recreationally due to symptoms which are now most intense in the medial left lower leg  He has 'diluted' pain in in the lateral thigh from the hip to above the hip  Pain increases with all weight bearing activities  States he gets intermittent tingling in the left lower leg  No history of back problems       Frequency of pain: intermittent  Quality of pain: aching low back and upper leg and sharp stabbing pain in there lower leg  Pain location: inside part of the shin, one episode of sharp low back pain that lasted 30 minutes, two weeks ago, mild above the knee to above the hip    Pain descriptors:  aching, Sharp pain, parasthesias  Pain intensity:  8/10 at its peak    Aggravating factors: all weight bearing activities  Easing factors: sitting    Hobbies/Interests: walking, cycling with e-bike    Patient goals:  decreased pain, independence with ADLs, increased mobility and increased strength    Objective     Neurological Testing     Reflexes   Left   Patellar (L4): normal (2+)  Achilles (S1): absent (0)    Right   Patellar (L4): normal (2+)  Achilles (S1): absent (0)    Active Range of Motion     Lumbar   Flexion: 65 degrees   Extension: -5 degrees  with pain  Left lateral flexion: 12 degrees       Right lateral flexion: 18 degrees     Strength/Myotome Testing     Lumbar   Left   Heel walk: normal  Toe walk: normal    Left Hip   Planes of Motion   Flexion: 5    Left Knee   Flexion: 5  Extension: 5    Left Ankle/Foot   Dorsiflexion: 5  Plantar flexion: 5  Inversion: 5  Eversion: 5  Great toe extension: 5    Tests     Lumbar     Left   Negative crossed SLR, passive SLR and slump test      General Comments:      Lumbar Comments  Left low back pain with bridges and left lower trunk rotation          Precautions: carotid stent, stress incontience      Manuals 3/25                                                                Neuro Re-Ed             Bracing with kick outs             Bracing with single heel tap             Bracing with double heel tap                                                                 Ther Ex             bike             PPT with kegel :10x5            Hip add squeeze with kegel             SKC :10x5:10x5            DKC             pball leg curls             pball bridges             pball LTR             Quad rocks :10x5            Seated trunk flexion             Standing lumbar stabilization activities                                                                              Ther Activity                                       Gait Training                                       Modalities

## 2021-03-30 ENCOUNTER — OFFICE VISIT (OUTPATIENT)
Dept: PHYSICAL THERAPY | Facility: CLINIC | Age: 81
End: 2021-03-30
Payer: MEDICARE

## 2021-03-30 DIAGNOSIS — M54.42 ACUTE LEFT-SIDED LOW BACK PAIN WITH LEFT-SIDED SCIATICA: Primary | ICD-10-CM

## 2021-03-30 PROCEDURE — 97112 NEUROMUSCULAR REEDUCATION: CPT | Performed by: PHYSICAL THERAPIST

## 2021-03-30 PROCEDURE — 97110 THERAPEUTIC EXERCISES: CPT | Performed by: PHYSICAL THERAPIST

## 2021-03-30 NOTE — PROGRESS NOTES
Daily Note     Today's date: 3/30/2021  Patient name: Ramy Miguel  : 1940  MRN: [id number]  Referring provider: Fe Ying PT  Dx:   Encounter Diagnosis     ICD-10-CM    1  Acute left-sided low back pain with left-sided sciatica  M54 42               Subjective: patient reports standing is still a challenge and he has increased pain quickly when doing so  He has not been having pain with home exercises  Objective: See treatment diary below      Assessment: Tolerated treatment fair  Patient exhibited good technique with therapeutic exercises and would benefit from continued PT  Progressed flexion biased stretches and initiated core stabilization  Increased pain with right hip flexion in standing compared to left      Plan: Progress treatment as tolerated         Precautions: carotid stent, stress incontience      Manuals 3/25 3/30                                                               Neuro Re-Ed             Bracing with kick outs             Bracing with single heel tap             Bracing with double heel tap                                                                 Ther Ex             bike  5'  lvl 1           PPT with kegel :10x5            Hip add squeeze with kegel             SKC :10x5 :10x5           DKC :10x5 :10x5           pball leg curls  :03x15           pball bridges             pball LTR             Quad rocks :10x5            Seated trunk flexion  :03x20           Standing lumbar stabilization activities  10 standing hip flexion bilat                                                                            Ther Activity                                       Gait Training                                       Modalities

## 2021-04-02 ENCOUNTER — OFFICE VISIT (OUTPATIENT)
Dept: PHYSICAL THERAPY | Facility: CLINIC | Age: 81
End: 2021-04-02
Payer: MEDICARE

## 2021-04-02 DIAGNOSIS — M54.42 ACUTE LEFT-SIDED LOW BACK PAIN WITH LEFT-SIDED SCIATICA: Primary | ICD-10-CM

## 2021-04-02 PROCEDURE — 97112 NEUROMUSCULAR REEDUCATION: CPT | Performed by: PHYSICAL THERAPIST

## 2021-04-02 PROCEDURE — 97110 THERAPEUTIC EXERCISES: CPT | Performed by: PHYSICAL THERAPIST

## 2021-04-02 NOTE — PROGRESS NOTES
Daily Note     Today's date: 2021  Patient name: Paulina Candelario  : 1940  MRN: [id number]  Referring provider: Brian Chase PT  Dx:   Encounter Diagnosis     ICD-10-CM    1  Acute left-sided low back pain with left-sided sciatica  M54 42               Subjective: patient reports less pain in the lower leg unless standing for longer periods of time  Symptoms are more in the thigh  Objective: See treatment diary below      Assessment: Tolerated treatment well  Patient exhibited good technique with therapeutic exercises and would benefit from continued PT  Reviewed concept of centralization and progressed core stabilization activities  Minimal leg pain felt during treatment  Plan: Progress treatment as tolerated         Precautions: carotid stent, stress incontience      Manuals 3/25 3/30 4/2                                                              Neuro Re-Ed             Bracing with kick outs and abduction   2x10ea          Bracing with single heel tap   2x10ea          Bracing with double heel tap                                                                 Ther Ex             bike  5'  lvl 1 7' lvl 1          PPT with kegel :10x5            Hip add squeeze with kegel             SKC :10x5 :10x5 :10x5          DKC :10x5 :10x5 :10x5          pball leg curls  :03x15 :03x20          pball bridges   20                       Quad rocks :10x5  :03x20          Seated trunk flexion  :03x20 :03x20          Standing lumbar stabilization activities  10 standing hip flexion bilat                                                                            Ther Activity                                       Gait Training                                       Modalities

## 2021-04-05 ENCOUNTER — OFFICE VISIT (OUTPATIENT)
Dept: PHYSICAL THERAPY | Facility: CLINIC | Age: 81
End: 2021-04-05
Payer: MEDICARE

## 2021-04-05 DIAGNOSIS — M54.42 ACUTE LEFT-SIDED LOW BACK PAIN WITH LEFT-SIDED SCIATICA: Primary | ICD-10-CM

## 2021-04-05 PROCEDURE — 97112 NEUROMUSCULAR REEDUCATION: CPT | Performed by: PHYSICAL THERAPIST

## 2021-04-05 PROCEDURE — 97110 THERAPEUTIC EXERCISES: CPT | Performed by: PHYSICAL THERAPIST

## 2021-04-05 NOTE — PROGRESS NOTES
Daily Note     Today's date: 2021  Patient name: Nga Mcbride  : 1940  MRN: [id number]  Referring provider: Mik Escobar, PT  Dx:   Encounter Diagnosis     ICD-10-CM    1  Acute left-sided low back pain with left-sided sciatica  M54 42                   Subjective: patient reports he was able to do more over the weekend without increased pain  He also had no pain with light cycling  Objective: See treatment diary below      Assessment: Tolerated treatment well  Patient exhibited good technique with therapeutic exercises and would benefit from continued PT  Reviewed form on core stabilization activities as patient felt lower leg symptoms with single leg heel tap  Plan: Progress treatment as tolerated         Precautions: carotid stent, stress incontience      Manuals 3/25 3/30 4/2 4/5                                                             Neuro Re-Ed             Bracing with kick outs and abduction   2x10ea 2x10ea         Bracing with single heel tap   2x10ea 2x10ea         Bracing with double heel tap                                                                 Ther Ex             bike  5'  lvl 1 7' lvl 1 8' lvl 2         PPT with kegel :10x5            Hip add squeeze with kegel   :05x15 :05x20         SKC :10x5 :10x5 :10x5 :10x5         DKC :10x5 :10x5 :10x5 :10x5         pball leg curls  :03x15 :03x20 :05x20         pball bridges   20 :03x20                      Quad rocks :10x5  :03x20 :03x20         Seated trunk flexion  :03x20 :03x20 :03x20 with pball         Standing lumbar stabilization activities  10 standing hip flexion bilat           Quad cat    :05x15,                                                             Ther Activity                                       Gait Training                                       Modalities

## 2021-04-08 ENCOUNTER — OFFICE VISIT (OUTPATIENT)
Dept: PHYSICAL THERAPY | Facility: CLINIC | Age: 81
End: 2021-04-08
Payer: MEDICARE

## 2021-04-08 DIAGNOSIS — M54.42 ACUTE LEFT-SIDED LOW BACK PAIN WITH LEFT-SIDED SCIATICA: Primary | ICD-10-CM

## 2021-04-08 PROCEDURE — 97530 THERAPEUTIC ACTIVITIES: CPT | Performed by: PHYSICAL MEDICINE & REHABILITATION

## 2021-04-08 PROCEDURE — 97112 NEUROMUSCULAR REEDUCATION: CPT | Performed by: PHYSICAL MEDICINE & REHABILITATION

## 2021-04-08 PROCEDURE — 97110 THERAPEUTIC EXERCISES: CPT | Performed by: PHYSICAL MEDICINE & REHABILITATION

## 2021-04-08 NOTE — PROGRESS NOTES
Daily Note     Today's date: 2021  Patient name: Kim Gonzales  : 1940  MRN: [id number]  Referring provider: Nolan Costa, PT  Dx:   Encounter Diagnosis     ICD-10-CM    1  Acute left-sided low back pain with left-sided sciatica  M54 43                   Subjective: Patient notes some sxs this morning, notes "I may have overdone it yesterday " Patient reports 4 mile bike ride and lifting "a few boxes of cat litter "     Objective: See treatment diary below    Assessment: Tolerated treatment well  Difficulty with isolation of spinal ROM without cueing during quad cat, fair carryover with cueing  Patient notes feeling somewhat "queasy" towards end of quad cat exercise, also with any exercise that requires him to be prone for any length of time  Quad rocks held  Initiated PPT in standing with positive result, minimal pain or progression of LE sxs  Discussed use of this technique when standing for funtional activity (drying dishes) at home  Patient exhibited good technique with therapeutic exercises and would benefit from continued PT  Plan: Progress treatment as tolerated         Precautions: carotid stent, stress incontience    Manuals 3/25 3/30 4/2 4/5 4/8                                                            Neuro Re-Ed             Bracing with kick outs and abduction   2x10ea 2x10ea 2x10 ea        Bracing with single heel tap   2x10ea 2x10ea 2x10 ea        Bracing with double heel tap                                                                 Ther Ex             bike  5'  lvl 1 7' lvl 1 8' lvl 2 8' L2        PPT with kegel :10x5            Hip add squeeze with kegel   :05x15 :05x20 Standing at wall with PPT 2x10x5"        SKC :10x5 :10x5 :10x5 :10x5 5x10" ea        DKC :10x5 :10x5 :10x5 :10x5         pball leg curls  :03x15 :03x20 :05x20 20x5"        pball bridges   20 :03x20 20x3"                     Quad rocks :10x5  :03x20 :03x20 np        Seated trunk flexion  :03x20 :03x20 :03x20 with pball 20x3" w/ pball        Standing lumbar stabilization activities  10 standing hip flexion bilat           Quad cat    :05x15 15x5"                                                            Ther Activity                                       Gait Training                                       Modalities

## 2021-04-12 ENCOUNTER — OFFICE VISIT (OUTPATIENT)
Dept: PHYSICAL THERAPY | Facility: CLINIC | Age: 81
End: 2021-04-12
Payer: MEDICARE

## 2021-04-12 DIAGNOSIS — M54.42 ACUTE LEFT-SIDED LOW BACK PAIN WITH LEFT-SIDED SCIATICA: Primary | ICD-10-CM

## 2021-04-12 PROCEDURE — 97112 NEUROMUSCULAR REEDUCATION: CPT | Performed by: PHYSICAL THERAPIST

## 2021-04-12 PROCEDURE — 97110 THERAPEUTIC EXERCISES: CPT | Performed by: PHYSICAL THERAPIST

## 2021-04-12 NOTE — PROGRESS NOTES
Daily Note     Today's date: 2021  Patient name: Colleen   : 1940  MRN: [id number]  Referring provider: Dang Canada, PT  Dx:   Encounter Diagnosis     ICD-10-CM    1  Acute left-sided low back pain with left-sided sciatica  M54 43               Subjective: patient reports improved walking tolerance as noted by his recent grocery shopping trip  Static standing tolerance has not improved yet  Objective: See treatment diary below      Assessment: Tolerated treatment well  Patient exhibited good technique with therapeutic exercises and would benefit from continued PT  Patient to hold on bridges at home as they cause left lower leg symptoms  Progressed core stabilization activities and continuing with flexion biased stretching  Plan: Progress treatment as tolerated         Precautions: carotid stent, stress incontience    Manuals 3/25 3/30 4/2 4/5 4/8 4/12                                                           Neuro Re-Ed             Bracing with kick outs and abduction   2x10ea 2x10ea 2x10 ea #1 2x10       Bracing with single heel tap   2x10ea 2x10ea 2x10 ea #1 2x10       Bracing with double heel tap                                                                 Ther Ex             bike  5'  lvl 1 7' lvl 1 8' lvl 2 8' L2 8' L2       PPT with kegel :10x5            Hip add squeeze with kegel   :05x15 :05x20 Standing at wall with PPT 2x10x5" :05x15       SKC :10x5 :10x5 :10x5 :10x5 5x10" ea :05x10       DKC :10x5 :10x5 :10x5 :10x5         pball leg curls  :03x15 :03x20 :05x20 20x5" :05x20       pball bridges   20 :03x20 20x3" :05x20                    Quad rocks :10x5  :03x20 :03x20 np D/c,        Seated trunk flexion  :03x20 :03x20 :03x20 with pball 20x3" w/ pball Hands on chair :05x20       Standing lumbar stabilization activities  10 standing hip flexion bilat           Quad cat    :05x15 15x5" :05x15                                                           Ther Activity Gait Training                                       Modalities

## 2021-04-15 ENCOUNTER — OFFICE VISIT (OUTPATIENT)
Dept: PHYSICAL THERAPY | Facility: CLINIC | Age: 81
End: 2021-04-15
Payer: MEDICARE

## 2021-04-15 DIAGNOSIS — M54.42 ACUTE LEFT-SIDED LOW BACK PAIN WITH LEFT-SIDED SCIATICA: Primary | ICD-10-CM

## 2021-04-15 PROCEDURE — 97110 THERAPEUTIC EXERCISES: CPT | Performed by: PHYSICAL THERAPIST

## 2021-04-15 PROCEDURE — 97112 NEUROMUSCULAR REEDUCATION: CPT | Performed by: PHYSICAL THERAPIST

## 2021-04-15 NOTE — PROGRESS NOTES
Daily Note     Today's date: 4/15/2021  Patient name: Chito Valenzuela  : 1940  MRN: [id number]  Referring provider: Gildardo Vidales PT  Dx:   Encounter Diagnosis     ICD-10-CM    1  Acute left-sided low back pain with left-sided sciatica  M54 43               Subjective: patient reports dull symptoms in the left leg and less overall in the left lower leg when standing  Objective: See treatment diary below      Assessment: Tolerated treatment well  Patient exhibited good technique with therapeutic exercises and would benefit from continued PT  Progressed core stabilization exercises and held on bridges as they tend to increase lower leg symptoms  Required verbal cueing to maintain posterior pelvic tilt during supine activities  Plan: Progress treatment as tolerated         Precautions: carotid stent, stress incontience    Manuals 3/25 3/30 4/2 4/5 4/8 4/12 4/15                                                          Neuro Re-Ed             Bracing with kick outs and abduction   2x10ea 2x10ea 2x10 ea #1 2x10       Bracing with single heel tap   2x10ea 2x10ea 2x10 ea #1 2x10 2x10 ea #1      Bracing with double heel tap             Bracing with full hip flexion to kick outs       #1 2x10      walking       200' with bracing and slight PPT                                Ther Ex             bike  5'  lvl 1 7' lvl 1 8' lvl 2 8' L2 8' L2 8' L4      PPT with kegel :10x5            Hip add squeeze with kegel   :05x15 :05x20 Standing at wall with PPT 2x10x5" :05x15 :10x10      SKC :10x5 :10x5 :10x5 :10x5 5x10" ea :05x10 :05x10      DKC :10x5 :10x5 :10x5 :10x5         pball leg curls  :03x15 :03x20 :05x20 20x5" :05x20 :05x20      pball bridges   20 :03x20 20x3" :05x20 held      pball LTR with bracing       15ea      Quad rocks :10x5  :03x20 :03x20 np D/c,        Seated trunk flexion  :03x20 :03x20 :03x20 with pball 20x3" w/ pball Hands on chair :05x20 :05x20 pball      Standing lumbar stabilization activities  10 standing hip flexion bilat           Quad cat    :05x15 15x5" :05x15                                                           Ther Activity                                       Gait Training                                       Modalities

## 2021-04-20 ENCOUNTER — OFFICE VISIT (OUTPATIENT)
Dept: PHYSICAL THERAPY | Facility: CLINIC | Age: 81
End: 2021-04-20
Payer: MEDICARE

## 2021-04-20 DIAGNOSIS — M54.42 ACUTE LEFT-SIDED LOW BACK PAIN WITH LEFT-SIDED SCIATICA: Primary | ICD-10-CM

## 2021-04-20 PROCEDURE — 97112 NEUROMUSCULAR REEDUCATION: CPT | Performed by: PHYSICAL THERAPIST

## 2021-04-20 PROCEDURE — 97110 THERAPEUTIC EXERCISES: CPT | Performed by: PHYSICAL THERAPIST

## 2021-04-20 NOTE — PROGRESS NOTES
Daily Note     Today's date: 2021  Patient name: Erika Butcher  : 1940  MRN: [id number]  Referring provider: Jonas Norris, SUSAN  Dx:   Encounter Diagnosis     ICD-10-CM    1  Acute left-sided low back pain with left-sided sciatica  M54 43                 Subjective: patient reports he has been feeling significantly better and was able to go on a walk, garden and do a few hours of fishing over the weekend  There was some pain with fishing but not severe  Objective: See treatment diary below      Assessment: Tolerated treatment well  Patient exhibited good technique with therapeutic exercises and would benefit from continued PT  He is progressing well, continue progressing flexion biased stretching and core stabilization  Plan: Progress treatment as tolerated         Precautions: carotid stent, stress incontience    Manuals 3/25 3/30 4/2 4/5 4/8 4/12 4/15 4/20                                                         Neuro Re-Ed             Bracing with kick outs and abduction   2x10ea 2x10ea 2x10 ea #1 2x10       Bracing with single heel tap   2x10ea 2x10ea 2x10 ea #1 2x10 2x10 ea #1 #2 2x10     Bracing with double heel tap             Bracing with full hip flexion to kick outs       #1 2x10 #2 3x10     walking       200' withxbracing and slight PPT reviewed                               Ther Ex             bike  5'  lvl 1 7' lvl 1 8' lvl 2 8' L2 8' L2 8' L4 8' lvl 4     PPT with kegel :10x5            Hip add squeeze with kegel   :05x15 :05x20 Standing at wall with PPT 2x10x5" :05x15 :10x10 :05x20     SKC :10x5 :10x5 :10x5 :10x5 5x10" ea :05x10 :05x10 :05x10     DKC :10x5 :10x5 :10x5 :10x5         pball leg curls  :03x15 :03x20 :05x20 20x5" :05x20 :05x20 :05x20     pball bridges   20 :03x20 20x3" :05x20 held      pball LTR with bracing       15ea 20     Quad rocks :10x5  :03x20 :03x20 np D/c,        Seated trunk flexion  :03x20 :03x20 :03x20 with pball 20x3" w/ pball Hands on chair :[id number] pball :05x20 pball     Standing lumbar stabilization activities  10 standing hip flexion bilat           Quad cat    :05x15 15x5" :05x15                                                           Ther Activity                                       Gait Training                                       Modalities

## 2021-04-23 ENCOUNTER — OFFICE VISIT (OUTPATIENT)
Dept: PHYSICAL THERAPY | Facility: CLINIC | Age: 81
End: 2021-04-23
Payer: MEDICARE

## 2021-04-23 DIAGNOSIS — M54.42 ACUTE LEFT-SIDED LOW BACK PAIN WITH LEFT-SIDED SCIATICA: Primary | ICD-10-CM

## 2021-04-23 PROCEDURE — 97110 THERAPEUTIC EXERCISES: CPT | Performed by: PHYSICAL THERAPIST

## 2021-04-23 PROCEDURE — 97112 NEUROMUSCULAR REEDUCATION: CPT | Performed by: PHYSICAL THERAPIST

## 2021-04-23 NOTE — PROGRESS NOTES
Daily Note     Today's date: 2021  Patient name: Hersey Gottron  : 1940  MRN: [id number]  Referring provider: Sinan Mo PT  Dx:   Encounter Diagnosis     ICD-10-CM    1  Acute left-sided low back pain with left-sided sciatica  M54 43               Subjective: patient reports he is doing well overall this week  He had increased pain yesterday and bending forward to make signs  Objective: See treatment diary below      Assessment: Tolerated treatment well  Patient exhibited good technique with therapeutic exercises and would benefit from continued PT   No LE symptoms with treatment, re-eval next week  Plan: Progress treatment as tolerated         Precautions: carotid stent, stress incontience    Manuals 3/25 3/30 4/2 4/5 4/8 4/12 4/15 4/20 4/23                                                        Neuro Re-Ed             Bracing with kick outs and abduction   2x10ea 2x10ea 2x10 ea #1 2x10       Bracing with single heel tap   2x10ea 2x10ea 2x10 ea #1 2x10 2x10 ea #1 #2 2x10 #2 2x10    Bracing with double heel tap             Bracing with full hip flexion to kick outs       #1 2x10 #2 3x10 #2 2x10    walking       200' withxbracing and slight PPT reviewed                               Ther Ex             bike  5'  lvl 1 7' lvl 1 8' lvl 2 8' L2 8' L2 8' L4 8' lvl 4 8' lvl 4    PPT with kegel :10x5            Hip add squeeze with kegel   :05x15 :05x20 Standing at wall with PPT 2x10x5" :05x15 :10x10 :05x20 :05x20    SKC :10x5 :10x5 :10x5 :10x5 5x10" ea :05x10 :05x10 :05x10 :05x10    DKC :10x5 :10x5 :10x5 :10x5     :10x5    pball leg curls  :03x15 :03x20 :05x20 20x5" :05x20 :05x20 :05x20 :05x20    pball bridges   20 :03x20 20x3" :05x20 held      pball LTR with bracing       15ea 20 20    Quad rocks :10x5  :03x20 :03x20 np D/c,        Seated trunk flexion  :03x20 :03x20 :03x20 with pball 20x3" w/ pball Hands on chair :05x20 :05x20 pball :05x20 pball :05x20    Standing lumbar stabilization activities  10 standing hip flexion bilat           Quad cat    :05x15 15x5" :05x15                                                           Ther Activity                                       Gait Training                                       Modalities

## 2021-04-27 ENCOUNTER — EVALUATION (OUTPATIENT)
Dept: PHYSICAL THERAPY | Facility: CLINIC | Age: 81
End: 2021-04-27
Payer: MEDICARE

## 2021-04-27 DIAGNOSIS — M54.42 ACUTE LEFT-SIDED LOW BACK PAIN WITH LEFT-SIDED SCIATICA: Primary | ICD-10-CM

## 2021-04-27 PROCEDURE — 97110 THERAPEUTIC EXERCISES: CPT | Performed by: PHYSICAL THERAPIST

## 2021-04-27 PROCEDURE — 97112 NEUROMUSCULAR REEDUCATION: CPT | Performed by: PHYSICAL THERAPIST

## 2021-04-27 NOTE — PROGRESS NOTES
PT Re-EVALUATION     Today's date: 21  Patient name: Helen Little  : 1940  MRN: [id number]  Referring provider: Betty Chopra PT  Dx:   1  Acute left-sided low back pain with left-sided sciatica          Bernie Araya has progress very well and he has returned to prior recreational activities with a peak pain of 1/10 recently  He will continue with flexion biased stretches and core strengthening activities at home        Lumbar classification: directional preference     Impairments:  none      Prognosis:  Good  Positive and negative prognostic indicator(s):  none     Goals:    STG Patient is independent with HEP (met)  STG Range of motion is improved by 25% in 2 weeks (MET)  LTG Balance & endurance & gait & locomotion are improved by 50% in 4 weeks (met)  LTG ADL performance improved to prior level of function in 6 weeks (met)     Planned interventions:  home exercise program       History of Current Injury: Patient reports onset left hip pain over two months ago when walking  Patient reports he is no longer has consistent or sharp pain in the left lower leg  He feels only an infrequent twinge with activity  He has returned to recreational walking and was able to walk two miles over the weekend  He is also now able to fish for extended periods of time (hours)           Frequency of pain: infrequent     Pain location: general low back  Pain descriptors:  mild ache  Pain intensity:  1/10 at its peak     Aggravating factors: all weight bearing activities  Easing factors: sitting     Hobbies/Interests: walking, cycling with e-bike     Patient goals:  decreased pain, independence with ADLs, increased mobility and increased strength     Objective      Neurological Testing      Reflexes   Left   Patellar (L4): normal (2+)  Achilles (S1): absent (0)     Right   Patellar (L4): normal (2+)  Achilles (S1): absent (0)     Active Range of Motion      Lumbar   Flexion: 80 (from 65 degrees)   Extension: 20 (from -5 degrees  with pain)  Left lateral flexion: 19 (from 12 degrees)   Right lateral flexion: 26  (from 18 degrees)     Strength/Myotome Testing      Lumbar   Left   Heel walk: normal  Toe walk: normal     Left Hip   Planes of Motion   Flexion: 5     Left Knee   Flexion: 5  Extension: 5     Left Ankle/Foot   Dorsiflexion: 5  Plantar flexion: 5  Inversion: 5  Eversion: 5  Great toe extension: 5     Tests      Lumbar      Left   Negative crossed SLR, passive SLR and slump test       General Comments:        Lumbar Comments  Left low back pain with bridges and left lower trunk rotation         Daily Note     Today's date: 2021  Patient name: Ade Freeman  : 1940  MRN: [id number]  Referring provider: Gelacio Perdomo PT  Dx:   Encounter Diagnosis     ICD-10-CM    1   Acute left-sided low back pain with left-sided sciatica  M54 42                  Precautions: carotid stent, stress incontience    Manuals 3/25 3/30 4/2 4/5 4/8 4/12 4/15 4/20 4/23 4/27                                                       Neuro Re-Ed             Bracing with kick outs and abduction   2x10ea 2x10ea 2x10 ea #1 2x10       Bracing with single heel tap   2x10ea 2x10ea 2x10 ea #1 2x10 2x10 ea #1 #2 2x10 #2 2x10 #2 2x10   Bracing with double heel tap             Bracing with full hip flexion to kick outs       #1 2x10 #2 3x10 #2 2x10 #2 2x10   walking       200' withxbracing and slight PPT reviewed                               Ther Ex             bike  5'  lvl 1 7' lvl 1 8' lvl 2 8' L2 8' L2 8' L4 8' lvl 4 8' lvl 4 8' lvl 4   PPT with kegel :10x5            Hip add squeeze with kegel   :05x15 :05x20 Standing at wall with PPT 2x10x5" :05x15 :10x10 :05x20 :05x20 :05x20   SKC :10x5 :10x5 :10x5 :10x5 5x10" ea :05x10 :05x10 :05x10 :05x10 :10x5   DKC :10x5 :10x5 :10x5 :10x5     :10x5 :10x5   pball leg curls  :03x15 :03x20 :05x20 20x5" :05x20 :05x20 :05x20 :05x20 :05x20   pball bridges   20 :03x20 20x3" :05x20 held      pball LTR with bracing       15ea 20 20 20   Quad rocks :10x5  :03x20 :03x20 np D/c,        Seated trunk flexion  :03x20 :03x20 :03x20 with pball 20x3" w/ pball Hands on chair :05x20 :05x20 pball :05x20 pball :05x20 :05x20   Standing lumbar stabilization activities  10 standing hip flexion bilat           Quad cat    :05x15 15x5" :05x15                                                           Ther Activity                                       Gait Training                                       Modalities

## 2021-07-21 ENCOUNTER — APPOINTMENT (OUTPATIENT)
Dept: LAB | Facility: CLINIC | Age: 81
End: 2021-07-21
Payer: MEDICARE

## 2021-07-21 DIAGNOSIS — C61 PROSTATE CANCER (HCC): ICD-10-CM

## 2021-07-21 LAB — PSA SERPL-MCNC: <0.1 NG/ML (ref 0–4)

## 2021-07-21 PROCEDURE — 84153 ASSAY OF PSA TOTAL: CPT

## 2021-08-02 ENCOUNTER — OFFICE VISIT (OUTPATIENT)
Dept: UROLOGY | Facility: CLINIC | Age: 81
End: 2021-08-02
Payer: MEDICARE

## 2021-08-02 VITALS
SYSTOLIC BLOOD PRESSURE: 130 MMHG | HEIGHT: 69 IN | BODY MASS INDEX: 26.51 KG/M2 | DIASTOLIC BLOOD PRESSURE: 82 MMHG | WEIGHT: 179 LBS

## 2021-08-02 DIAGNOSIS — C61 PROSTATE CANCER (HCC): Primary | ICD-10-CM

## 2021-08-02 LAB — POST-VOID RESIDUAL VOLUME, ML POC: 0 ML

## 2021-08-02 PROCEDURE — 99213 OFFICE O/P EST LOW 20 MIN: CPT | Performed by: PHYSICIAN ASSISTANT

## 2021-08-02 PROCEDURE — 51798 US URINE CAPACITY MEASURE: CPT | Performed by: PHYSICIAN ASSISTANT

## 2021-08-02 NOTE — PROGRESS NOTES
Assessment/Plan:    Prostate cancer [facility])    Patient is status post robotic prostatectomy in 2009 with Dr Sandeep Sewell  Presenting for 1 year follow-up  patient's PSA continues to be undetectable  Will repeat PSA in 1 year  PVR 0        Patient reporting some incontinence as well as erectile dysfunction  Patient interested in erectile dysfunction medications as he is not currently sexually active with his wife  He does report that he has some incontinence which has worsened over the for the course of a few years however he is currently satisfied with this  He wears a pad occasionally  discussed with patient that he can perform Kegel exercises  If this does not improve can refer to pelvic floor therapy for additional conservative management of incontinence  He is currently not interested in medication at this time  Patient is agreeable to plan at this time will follow-up in 1 year  Problem List Items Addressed This Visit        Genitourinary    Prostate cancer [facility]) - Primary       Patient is status post robotic prostatectomy in 2009 with Dr Sandeep Sewell  Presenting for 1 year follow-up  patient's PSA continues to be undetectable  Will repeat PSA in 1 year  PVR 0        Patient reporting some incontinence as well as erectile dysfunction  Patient interested in erectile dysfunction medications as he is not currently sexually active with his wife  He does report that he has some incontinence which has worsened over the for the course of a few years however he is currently satisfied with this  He wears a pad occasionally  discussed with patient that he can perform Kegel exercises  If this does not improve can refer to pelvic floor therapy for additional conservative management of incontinence  He is currently not interested in medication at this time  Patient is agreeable to plan at this time will follow-up in 1 year           Relevant Orders    POCT Measure PVR (Completed)    PSA Total, Diagnostic            Subjective:      Patient ID: Kimo Hassan is a [de-identified] y o  male  HPI   Patient is a 81-year-old male with a history of prostate cancer status post robotic prostatectomy in 2009 with Dr Salty Cheek  Presenting for 1 year follow-up with PSA  Patient's PSA currently continues to be undetectable  Patient currently reporting that he is doing well  Does report that he has some incontinence which has gone worse throughout the past few years but is manageable  And is not bothersome to him at this time  He reports that he does have erectile dysfunction but his wife is not interested in sexual intercourse therefore he is satisfied with his sexual function at this time  He denies any additional complaints at this time  The following portions of the patient's history were reviewed and updated as appropriate:   He  has a past medical history of Erectile dysfunction, Heart disease, and Prostate cancer ([facility] )  He   Patient Active Problem List    Diagnosis Date Noted    Coronary artery disease involving native coronary artery of native heart without angina pectoris 03/10/2021    Sciatic pain, left 03/10/2021    Postnasal drip 03/10/2021    Essential hypertension 03/02/2020    Mixed hyperlipidemia 03/02/2020    Prostate cancer ([facility] ) 07/16/2018     He  has a past surgical history that includes Carotid stent; Prostatectomy; and Hernia repair  His family history includes Prostate cancer in his father  He  reports that he has never smoked  He has never used smokeless tobacco  He reports current alcohol use of about 7 0 standard drinks of alcohol per week  He reports that he does not use drugs    Current Outpatient Medications   Medication Sig Dispense Refill    Ascorbic Acid (vitamin C) 1000 MG tablet Take 1,000 mg by mouth daily with breakfast      aspirin 325 mg tablet Take 81 mg by mouth daily with dinner Blood thinner for stent      atenolol (TENORMIN) 25 mg tablet daily  Biotin 28091 MCG TABS Take 1 capsule by mouth daily with dinner      Calcium Carbonate-Vit D-Min (CALCIUM 1200 PO) Take 1 capsule by mouth daily with breakfast      Chlorpheniramine-DM 4-30 MG TABS Take 1 capsule by mouth daily For sinus drainage      cholecalciferol (VITAMIN D3) 1,000 units tablet Take 1,000 Units by mouth 2 (two) times a day       co-enzyme Q-10 30 MG capsule Take 30 mg by mouth 3 (three) times a day      CRANBERRY PO Take by mouth daily To prevent gout      diphenhydrAMINE (BENADRYL) 50 MG tablet Take 50 mg by mouth daily at bedtime as needed for itching Sinus drainage/sleep aid      glucosamine-chondroitin 500-400 MG tablet Take 1 tablet by mouth 2 (two) times a day with meals       methocarbamol (ROBAXIN) 500 mg tablet Take 1 tablet (500 mg total) by mouth 4 (four) times a day as needed for muscle spasms 20 tablet 0    Misc Natural Products (Tart Cherry Advanced) CAPS Take 1 capsule by mouth daily with breakfast 1000 mg to prevent gout      niacin 100 mg tablet Take 250 mg by mouth 2 (two) times a day before lunch and dinner To raise HDL      Omega-3 Fatty Acids (fish oil) 1,000 mg Take 1,000 mg by mouth daily      rosuvastatin (CRESTOR) 5 mg tablet TAKE 1 TABLET DAILY AT BEDTIME      Zinc 50 MG CAPS Take 1 capsule by mouth daily       No current facility-administered medications for this visit  He is allergic to grass extracts [gramineae pollens]       Review of Systems   Constitutional: Negative  Negative for chills and fever  HENT: Negative  Eyes: Negative  Respiratory: Negative  Cardiovascular: Negative  Gastrointestinal: Negative  Negative for abdominal pain, diarrhea, nausea and vomiting  Endocrine: Negative  Genitourinary: Negative  Negative for difficulty urinating, dysuria, frequency, hematuria and urgency  Incontinence   Musculoskeletal: Negative  Allergic/Immunologic: Negative  Neurological: Negative  Hematological: Negative  Psychiatric/Behavioral: Negative  Objective:      /82 (BP Location: Left arm, Patient Position: Sitting, Cuff Size: Adult)   Ht 5' 9" (1 753 m)   Wt 81 2 kg (179 lb)   BMI 26 43 kg/m²          Physical Exam  Constitutional:       General: He is not in acute distress  Appearance: Normal appearance  He is normal weight  He is not ill-appearing, toxic-appearing or diaphoretic  HENT:      Head: Normocephalic and atraumatic  Right Ear: External ear normal       Left Ear: External ear normal       Nose: Nose normal       Mouth/Throat:      Pharynx: Oropharynx is clear  Eyes:      General: No scleral icterus  Conjunctiva/sclera: Conjunctivae normal    Cardiovascular:      Rate and Rhythm: Normal rate and regular rhythm  Pulses: Normal pulses  Pulmonary:      Effort: Pulmonary effort is normal  No respiratory distress  Musculoskeletal:         General: Normal range of motion  Cervical back: Normal range of motion  Skin:     General: Skin is warm and dry  Neurological:      General: No focal deficit present  Mental Status: He is alert and oriented to person, place, and time  Psychiatric:         Mood and Affect: Mood normal          Behavior: Behavior normal          Thought Content:  Thought content normal          Judgment: Judgment normal            Evelio Garcia PA-C

## 2021-08-02 NOTE — ASSESSMENT & PLAN NOTE
Patient is status post robotic prostatectomy in 2009 with Dr Kylie Werner  Presenting for 1 year follow-up  patient's PSA continues to be undetectable  Will repeat PSA in 1 year  PVR 0        Patient reporting some incontinence as well as erectile dysfunction  Patient interested in erectile dysfunction medications as he is not currently sexually active with his wife  He does report that he has some incontinence which has worsened over the for the course of a few years however he is currently satisfied with this  He wears a pad occasionally  discussed with patient that he can perform Kegel exercises  If this does not improve can refer to pelvic floor therapy for additional conservative management of incontinence  He is currently not interested in medication at this time  Patient is agreeable to plan at this time will follow-up in 1 year

## 2021-08-25 ENCOUNTER — APPOINTMENT (OUTPATIENT)
Dept: LAB | Facility: CLINIC | Age: 81
End: 2021-08-25
Payer: MEDICARE

## 2021-08-25 DIAGNOSIS — I10 ESSENTIAL HYPERTENSION: ICD-10-CM

## 2021-08-25 DIAGNOSIS — I25.10 DISEASE OF CARDIOVASCULAR SYSTEM: ICD-10-CM

## 2021-08-25 DIAGNOSIS — E78.2 MIXED HYPERLIPIDEMIA: ICD-10-CM

## 2021-08-25 DIAGNOSIS — I10 ESSENTIAL HYPERTENSION, MALIGNANT: ICD-10-CM

## 2021-08-25 LAB
ALT SERPL W P-5'-P-CCNC: 25 U/L (ref 12–78)
ANION GAP SERPL CALCULATED.3IONS-SCNC: 4 MMOL/L (ref 4–13)
AST SERPL W P-5'-P-CCNC: 20 U/L (ref 5–45)
BUN SERPL-MCNC: 21 MG/DL (ref 5–25)
CALCIUM SERPL-MCNC: 8.9 MG/DL (ref 8.3–10.1)
CHLORIDE SERPL-SCNC: 109 MMOL/L (ref 100–108)
CHOLEST SERPL-MCNC: 102 MG/DL (ref 50–200)
CO2 SERPL-SCNC: 28 MMOL/L (ref 21–32)
CREAT SERPL-MCNC: 0.89 MG/DL (ref 0.6–1.3)
GFR SERPL CREATININE-BSD FRML MDRD: 81 ML/MIN/1.73SQ M
GLUCOSE P FAST SERPL-MCNC: 102 MG/DL (ref 65–99)
HDLC SERPL-MCNC: 39 MG/DL
LDLC SERPL CALC-MCNC: 51 MG/DL (ref 0–100)
POTASSIUM SERPL-SCNC: 4 MMOL/L (ref 3.5–5.3)
SODIUM SERPL-SCNC: 141 MMOL/L (ref 136–145)
TRIGL SERPL-MCNC: 59 MG/DL

## 2021-08-25 PROCEDURE — 84460 ALANINE AMINO (ALT) (SGPT): CPT

## 2021-08-25 PROCEDURE — 36415 COLL VENOUS BLD VENIPUNCTURE: CPT

## 2021-08-25 PROCEDURE — 84450 TRANSFERASE (AST) (SGOT): CPT

## 2021-08-25 PROCEDURE — 80048 BASIC METABOLIC PNL TOTAL CA: CPT

## 2021-08-25 PROCEDURE — 80061 LIPID PANEL: CPT

## 2021-09-09 NOTE — PROGRESS NOTES
FAMILY MEDICINE PROGRESS NOTE    Date of Service: 09/10/21  Primary Care Provider:   Jan Villagomez MD       Name: Rosio Walls       : 1940       Age:81 y o  Sex: male      MRN: [id number]      Chief Complaint:Medicare Wellness Visit       ASSESSMENT and PLAN:  Rosio Walls is a [de-identified] y o  male with:     Problem List Items Addressed This Visit        Cardiovascular and Mediastinum    Essential hypertension - Primary     BP Readings from Last 3 Encounters:   09/10/21 122/60   21 130/82   21 138/76     Controlled, continue atenolol         Relevant Medications    atenolol (TENORMIN) 25 mg tablet    Coronary artery disease involving native coronary artery of native heart without angina pectoris     Follows with cardiology, currently on full dose aspirin, beta blocker and statin  His cardiologist did not agree with baby aspirin, he will continue full strength until he finishes prescription, then take 2 baby aspirin daily  Discussed that if he has bleeding event would need to consider taking only baby aspirin            Relevant Medications    rosuvastatin (CRESTOR) 5 mg tablet    atenolol (TENORMIN) 25 mg tablet       Genitourinary    Prostate cancer ([facility] [address] )     Lab Results   Component Value Date    PSA <0 1 2021    PSA <0 1 2020    PSA <0 1 2019     Stable PSA, no urinary symptoms, follows with urology            Other    Mixed hyperlipidemia     Lab Results   Component Value Date    HDL 39 (L) 2021    LDLCALC 51 2021    TRIG 59 2021     Controlled, continue rosuvastatin 5 mg daily  He also takes niacin to help raise HDL, omega-3 supplementation   Counseled on importance of healthy eating, regular physical activity          Relevant Medications    rosuvastatin (CRESTOR) 5 mg tablet    Postnasal drip     Again advised against use of 1st generation antihistamines especially 2 different 1st generation antihistamines (he takes benadryl and chlorpheniramine) Recommended regular use of Zyrtec or other second generation  Will trial Flonase at night           Other Visit Diagnoses     Encounter for immunization        Relevant Orders    influenza vaccine, high-dose, PF 0 7 mL (FLUZONE HIGH-DOSE) (Completed)    Post-nasal drip        Relevant Medications    fluticasone (FLONASE) 50 mcg/act nasal spray    Medicare annual wellness visit, subsequent        BMI 26 0-26 9,adult              SUBJECTIVE:  Saadia Roman is a [de-identified] y o  male with history of hypertension, hyperlipidemia, coronary artery disease, prediabetes, prostate cancer who presents today with a chief complaint of Medicare Wellness Visit  HPI     Patient presents for AWV  He reports that his back has improved with PT  Hypertension  He is on atenolol 25 mg daily, as well as statin with rosuvastatin 5 mg  He sees cardiology, Dr Cisco Santa  He had a stent placed in 2002  He was on Plavix for a long time  He was on aspirin for 325 mg, discussed going down to 81 mg, though cardiologist would like him on higher dose  He plans to go to to 81 mg twice a day  Post Nasal Drip  He takes antihistamine prior to bed and then again at 2 am due to post-nasal drip  Review of Systems   HENT: Positive for postnasal drip and rhinorrhea  I have reviewed the patient's Past Medical History      Current Outpatient Medications:     Ascorbic Acid (vitamin C) 1000 MG tablet, Take 1,000 mg by mouth daily with breakfast, Disp: , Rfl:     aspirin 325 mg tablet, Take 81 mg by mouth daily with dinner Blood thinner for stent, Disp: , Rfl:     atenolol (TENORMIN) 25 mg tablet, Take 1 tablet (25 mg total) by mouth daily, Disp: 90 tablet, Rfl: 3    Biotin 12107 MCG TABS, Take 1 capsule by mouth daily with dinner, Disp: , Rfl:     Calcium Carbonate-Vit D-Min (CALCIUM 1200 PO), Take 1 capsule by mouth daily with breakfast, Disp: , Rfl:     Chlorpheniramine-DM 4-30 MG TABS, Take 1 capsule by mouth daily For sinus drainage, Disp: , Rfl:     cholecalciferol (VITAMIN D3) 1,000 units tablet, Take 1,000 Units by mouth 2 (two) times a day , Disp: , Rfl:     co-enzyme Q-10 30 MG capsule, Take 30 mg by mouth 3 (three) times a day, Disp: , Rfl:     CRANBERRY PO, Take by mouth daily To prevent gout, Disp: , Rfl:     diphenhydrAMINE (BENADRYL) 50 MG tablet, Take 50 mg by mouth daily at bedtime as needed for itching Sinus drainage/sleep aid, Disp: , Rfl:     glucosamine-chondroitin 500-400 MG tablet, Take 1 tablet by mouth 2 (two) times a day with meals , Disp: , Rfl:     methocarbamol (ROBAXIN) 500 mg tablet, Take 1 tablet (500 mg total) by mouth 4 (four) times a day as needed for muscle spasms, Disp: 20 tablet, Rfl: 0    Misc Natural Products (Tart Cherry Advanced) CAPS, Take 1 capsule by mouth daily with breakfast 1000 mg to prevent gout, Disp: , Rfl:     niacin 100 mg tablet, Take 250 mg by mouth 2 (two) times a day before lunch and dinner To raise HDL, Disp: , Rfl:     Omega-3 Fatty Acids (fish oil) 1,000 mg, Take 1,000 mg by mouth daily, Disp: , Rfl:     rosuvastatin (CRESTOR) 5 mg tablet, Take 1 tablet (5 mg total) by mouth daily at bedtime, Disp: 90 tablet, Rfl: 3    Zinc 50 MG CAPS, Take 1 capsule by mouth daily, Disp: , Rfl:     fluticasone (FLONASE) 50 mcg/act nasal spray, 1 spray into each nostril daily, Disp: 15 8 mL, Rfl: 2    OBJECTIVE:  /60   Pulse 72   Temp (!) 96 °F (35 6 °C)   Resp 16   Ht 5' 9" (1 753 m)   Wt 81 6 kg (180 lb)   BMI 26 58 kg/m²    BP Readings from Last 3 Encounters:   09/10/21 122/60   08/02/21 130/82   03/25/21 138/76      Wt Readings from Last 3 Encounters:   09/10/21 81 6 kg (180 lb)   08/02/21 81 2 kg (179 lb)   03/19/21 80 7 kg (178 lb)      Physical Exam  Constitutional:       General: He is not in acute distress  Appearance: Normal appearance  He is not ill-appearing or toxic-appearing  HENT:      Head: Normocephalic and atraumatic        Right Ear: Tympanic membrane and external ear normal  There is no impacted cerumen  Left Ear: Tympanic membrane and external ear normal  There is no impacted cerumen  Nose: Congestion present  Mouth/Throat:      Mouth: Mucous membranes are moist       Pharynx: No oropharyngeal exudate or posterior oropharyngeal erythema  Eyes:      Extraocular Movements: Extraocular movements intact  Conjunctiva/sclera: Conjunctivae normal    Cardiovascular:      Rate and Rhythm: Normal rate and regular rhythm  Pulses: Normal pulses  Heart sounds: Normal heart sounds  No murmur heard  No friction rub  No gallop  Pulmonary:      Effort: Pulmonary effort is normal  No respiratory distress  Breath sounds: No wheezing or rales  Abdominal:      General: There is no distension  Palpations: Abdomen is soft  Musculoskeletal:      Cervical back: Normal range of motion and neck supple  No rigidity  Right lower leg: No edema  Left lower leg: No edema  Skin:     General: Skin is warm and dry  Findings: No erythema or rash  Neurological:      General: No focal deficit present  Mental Status: He is alert and oriented to person, place, and time     Psychiatric:         Mood and Affect: Mood normal          Behavior: Behavior normal          Lab Results   Component Value Date    SODIUM 141 08/25/2021    K 4 0 08/25/2021     (H) 08/25/2021    CO2 28 08/25/2021    BUN 21 08/25/2021    CREATININE 0 89 08/25/2021    CALCIUM 8 9 08/25/2021     Lab Results   Component Value Date    CHOLESTEROL 102 08/25/2021     Lab Results   Component Value Date    HDL 39 (L) 08/25/2021     Lab Results   Component Value Date    TRIG 59 08/25/2021     No results found for: Mami   Lab Results   Component Value Date    LDLCALC 51 08/25/2021     Lab Results   Component Value Date    PSA <0 1 07/21/2021    PSA <0 1 07/17/2020    PSA <0 1 07/08/2019              Return in about 6 months (around 3/10/2022) for follow-up Rosella Goldmann Leonor Linger, MD    Note: Portions of the record have been created with voice recognition software  Occasional wrong word or "sound a like" substitutions may have occurred due to the inherent limitations of voice recognition software  Read the chart carefully and recognize, using context, where substitutions have occurred

## 2021-09-10 ENCOUNTER — OFFICE VISIT (OUTPATIENT)
Dept: FAMILY MEDICINE CLINIC | Facility: CLINIC | Age: 81
End: 2021-09-10
Payer: MEDICARE

## 2021-09-10 VITALS
WEIGHT: 180 LBS | HEART RATE: 72 BPM | SYSTOLIC BLOOD PRESSURE: 122 MMHG | RESPIRATION RATE: 16 BRPM | DIASTOLIC BLOOD PRESSURE: 60 MMHG | HEIGHT: 69 IN | TEMPERATURE: 96 F | BODY MASS INDEX: 26.66 KG/M2

## 2021-09-10 DIAGNOSIS — Z23 ENCOUNTER FOR IMMUNIZATION: ICD-10-CM

## 2021-09-10 DIAGNOSIS — E78.2 MIXED HYPERLIPIDEMIA: ICD-10-CM

## 2021-09-10 DIAGNOSIS — R09.82 POST-NASAL DRIP: ICD-10-CM

## 2021-09-10 DIAGNOSIS — R09.82 POSTNASAL DRIP: ICD-10-CM

## 2021-09-10 DIAGNOSIS — I10 ESSENTIAL HYPERTENSION: Primary | ICD-10-CM

## 2021-09-10 DIAGNOSIS — Z00.00 MEDICARE ANNUAL WELLNESS VISIT, SUBSEQUENT: ICD-10-CM

## 2021-09-10 DIAGNOSIS — C61 PROSTATE CANCER (HCC): ICD-10-CM

## 2021-09-10 DIAGNOSIS — I25.10 CORONARY ARTERY DISEASE INVOLVING NATIVE CORONARY ARTERY OF NATIVE HEART WITHOUT ANGINA PECTORIS: ICD-10-CM

## 2021-09-10 PROCEDURE — 90662 IIV NO PRSV INCREASED AG IM: CPT

## 2021-09-10 PROCEDURE — 1123F ACP DISCUSS/DSCN MKR DOCD: CPT | Performed by: FAMILY MEDICINE

## 2021-09-10 PROCEDURE — G0439 PPPS, SUBSEQ VISIT: HCPCS | Performed by: FAMILY MEDICINE

## 2021-09-10 PROCEDURE — 99214 OFFICE O/P EST MOD 30 MIN: CPT | Performed by: FAMILY MEDICINE

## 2021-09-10 PROCEDURE — G0008 ADMIN INFLUENZA VIRUS VAC: HCPCS

## 2021-09-10 RX ORDER — ATENOLOL 25 MG/1
25 TABLET ORAL DAILY
Qty: 90 TABLET | Refills: 3 | Status: SHIPPED | OUTPATIENT
Start: 2021-09-10

## 2021-09-10 RX ORDER — FLUTICASONE PROPIONATE 50 MCG
1 SPRAY, SUSPENSION (ML) NASAL DAILY
Qty: 15.8 ML | Refills: 2 | Status: SHIPPED | OUTPATIENT
Start: 2021-09-10 | End: 2022-04-22 | Stop reason: ALTCHOICE

## 2021-09-10 RX ORDER — ROSUVASTATIN CALCIUM 5 MG/1
5 TABLET, COATED ORAL
Qty: 90 TABLET | Refills: 3 | Status: SHIPPED | OUTPATIENT
Start: 2021-09-10

## 2021-09-10 NOTE — PATIENT INSTRUCTIONS

## 2021-09-10 NOTE — ASSESSMENT & PLAN NOTE
Follows with cardiology, currently on full dose aspirin, beta blocker and statin  His cardiologist did not agree with baby aspirin, he will continue full strength until he finishes prescription, then take 2 baby aspirin daily  Discussed that if he has bleeding event would need to consider taking only baby aspirin

## 2021-09-10 NOTE — ASSESSMENT & PLAN NOTE
Lab Results   Component Value Date    PSA <0 1 07/21/2021    PSA <0 1 07/17/2020    PSA <0 1 07/08/2019     Stable PSA, no urinary symptoms, follows with urology

## 2021-09-10 NOTE — ASSESSMENT & PLAN NOTE
BP Readings from Last 3 Encounters:   09/10/21 122/60   08/02/21 130/82   03/25/21 138/76     Controlled, continue atenolol

## 2021-09-10 NOTE — ASSESSMENT & PLAN NOTE
Again advised against use of 1st generation antihistamines especially 2 different 1st generation antihistamines (he takes benadryl and chlorpheniramine)  Recommended regular use of Zyrtec or other second generation     Will trial Flonase at night

## 2021-09-10 NOTE — ASSESSMENT & PLAN NOTE
Lab Results   Component Value Date    HDL 39 (L) 08/25/2021    LDLCALC 51 08/25/2021    TRIG 59 08/25/2021     Controlled, continue rosuvastatin 5 mg daily  He also takes niacin to help raise HDL, omega-3 supplementation   Counseled on importance of healthy eating, regular physical activity

## 2021-09-10 NOTE — PROGRESS NOTES
Assessment and Plan:     Problem List Items Addressed This Visit        Cardiovascular and Mediastinum    Essential hypertension - Primary     BP Readings from Last 3 Encounters:   09/10/21 122/60   08/02/21 130/82   03/25/21 138/76     Controlled, continue atenolol         Relevant Medications    atenolol (TENORMIN) 25 mg tablet    Coronary artery disease involving native coronary artery of native heart without angina pectoris     Follows with cardiology, currently on full dose aspirin, beta blocker and statin  His cardiologist did not agree with baby aspirin, he will continue full strength until he finishes prescription, then take 2 baby aspirin daily  Discussed that if he has bleeding event would need to consider taking only baby aspirin  Relevant Medications    rosuvastatin (CRESTOR) 5 mg tablet    atenolol (TENORMIN) 25 mg tablet       Genitourinary    Prostate cancer ([facility] [address] )     Lab Results   Component Value Date    PSA <0 1 07/21/2021    PSA <0 1 07/17/2020    PSA <0 1 07/08/2019     Stable PSA, no urinary symptoms, follows with urology            Other    Mixed hyperlipidemia     Lab Results   Component Value Date    HDL 39 (L) 08/25/2021    LDLCALC 51 08/25/2021    TRIG 59 08/25/2021     Controlled, continue rosuvastatin 5 mg daily  He also takes niacin to help raise HDL, omega-3 supplementation   Counseled on importance of healthy eating, regular physical activity          Relevant Medications    rosuvastatin (CRESTOR) 5 mg tablet    Postnasal drip     Again advised against use of 1st generation antihistamines especially 2 different 1st generation antihistamines (he takes benadryl and chlorpheniramine)  Recommended regular use of Zyrtec or other second generation     Will trial Flonase at night           Other Visit Diagnoses     Encounter for immunization        Relevant Orders    influenza vaccine, high-dose, PF 0 7 mL (FLUZONE HIGH-DOSE) (Completed)    Post-nasal drip        Relevant Medications fluticasone (FLONASE) 50 mcg/act nasal spray    Medicare annual wellness visit, subsequent        BMI 26 0-26 9,adult               Preventive health issues were discussed with patient, and age appropriate screening tests were ordered as noted in patient's After Visit Summary  Personalized health advice and appropriate referrals for health education or preventive services given if needed, as noted in patient's After Visit Summary  History of Present Illness:     Patient presents for Medicare Annual Wellness visit    Patient Care Team:  Virginia Jason MD as PCP - General (Family Medicine)  Soledad Goodell, MD     Problem List:     Patient Active Problem List   Diagnosis    Prostate cancer [facility])    Essential hypertension    Mixed hyperlipidemia    Coronary artery disease involving native coronary artery of native heart without angina pectoris    Sciatic pain, left    Postnasal drip      Past Medical and Surgical History:     Past Medical History:   Diagnosis Date    Erectile dysfunction     Heart disease     Prostate cancer ([address] )      Past Surgical History:   Procedure Laterality Date    CAROTID STENT      HERNIA REPAIR      PROSTATECTOMY        Family History:     Family History   Problem Relation Age of Onset    Prostate cancer Father       Social History:     Social History     Socioeconomic History    Marital status: /Civil Union     Spouse name: None    Number of children: None    Years of education: None    Highest education level: None   Occupational History    None   Tobacco Use    Smoking status: Never Smoker    Smokeless tobacco: Never Used   Vaping Use    Vaping Use: Never used   Substance and Sexual Activity    Alcohol use:  Yes     Alcohol/week: 7 0 standard drinks     Types: 7 Glasses of wine per week    Drug use: No    Sexual activity: None   Other Topics Concern    None   Social History Narrative    None     Social Determinants of Health     Financial Resource Strain:     Difficulty of Paying Living Expenses:    Food Insecurity:     Worried About Running Out of Food in the Last Year:     [address] in the Last Year:    Transportation Needs:     Lack of Transportation (Medical):      Lack of Transportation (Non-Medical):    Physical Activity:     Days of Exercise per Week:     Minutes of Exercise per Session:    Stress:     Feeling of Stress :    Social Connections:     Frequency of Communication with Friends and Family:     Frequency of Social Gatherings with Friends and Family:     Attends Jainism Services:     Active Member of Clubs or Organizations:     Attends Club or Organization Meetings:     Marital Status:    Intimate Partner Violence:     Fear of Current or Ex-Partner:     Emotionally Abused:     Physically Abused:     Sexually Abused:       Medications and Allergies:     Current Outpatient Medications   Medication Sig Dispense Refill    Ascorbic Acid (vitamin C) 1000 MG tablet Take 1,000 mg by mouth daily with breakfast      aspirin 325 mg tablet Take 81 mg by mouth daily with dinner Blood thinner for stent      atenolol (TENORMIN) 25 mg tablet Take 1 tablet (25 mg total) by mouth daily 90 tablet 3    Biotin 46676 MCG TABS Take 1 capsule by mouth daily with dinner      Calcium Carbonate-Vit D-Min (CALCIUM 1200 PO) Take 1 capsule by mouth daily with breakfast      Chlorpheniramine-DM 4-30 MG TABS Take 1 capsule by mouth daily For sinus drainage      cholecalciferol (VITAMIN D3) 1,000 units tablet Take 1,000 Units by mouth 2 (two) times a day       co-enzyme Q-10 30 MG capsule Take 30 mg by mouth 3 (three) times a day      CRANBERRY PO Take by mouth daily To prevent gout      diphenhydrAMINE (BENADRYL) 50 MG tablet Take 50 mg by mouth daily at bedtime as needed for itching Sinus drainage/sleep aid      glucosamine-chondroitin 500-400 MG tablet Take 1 tablet by mouth 2 (two) times a day with meals       methocarbamol (ROBAXIN) 500 mg tablet Take 1 tablet (500 mg total) by mouth 4 (four) times a day as needed for muscle spasms 20 tablet 0    Misc Natural Products (Tart Cherry Advanced) CAPS Take 1 capsule by mouth daily with breakfast 1000 mg to prevent gout      niacin 100 mg tablet Take 250 mg by mouth 2 (two) times a day before lunch and dinner To raise HDL      Omega-3 Fatty Acids (fish oil) 1,000 mg Take 1,000 mg by mouth daily      rosuvastatin (CRESTOR) 5 mg tablet Take 1 tablet (5 mg total) by mouth daily at bedtime 90 tablet 3    Zinc 50 MG CAPS Take 1 capsule by mouth daily      fluticasone (FLONASE) 50 mcg/act nasal spray 1 spray into each nostril daily 15 8 mL 2     No current facility-administered medications for this visit  Allergies   Allergen Reactions    Grass Extracts [Gramineae Pollens] Allergic Rhinitis      Immunizations:     Immunization History   Administered Date(s) Administered    INFLUENZA 10/15/2017, 10/30/2017, 10/08/2018    Influenza Split High Dose Preservative Free IM 10/08/2018, 10/02/2019    Influenza, high dose seasonal 0 7 mL 10/13/2020, 09/10/2021    Influenza, seasonal, injectable 10/07/2010    Influenza, seasonal, injectable, preservative free 10/19/2015    Pneumococcal Conjugate 13-Valent 01/26/2015    Pneumococcal Polysaccharide PPV23 12/16/2019    SARS-CoV-2 / COVID-19 mRNA IM (Moderna) 01/14/2021, 02/11/2021    Tdap 12/20/2017    Zoster 10/07/2010      Health Maintenance: There are no preventive care reminders to display for this patient  There are no preventive care reminders to display for this patient  Medicare Health Risk Assessment:     /60   Pulse 72   Temp (!) 96 °F (35 6 °C)   Resp 16   Ht 5' 9" (1 753 m)   Wt 81 6 kg (180 lb)   BMI 26 58 kg/m²      Jane Rios is here for his Subsequent Wellness visit  Last Medicare Wellness visit information reviewed, patient interviewed and updates made to the record today        Health Risk Assessment: Patient rates overall health as good  Patient feels that their physical health rating is same  Patient is satisfied with their life  Eyesight was rated as same  Hearing was rated as same  Patient feels that their emotional and mental health rating is same  Patients states they are never, rarely angry  Patient states they are never, rarely unusually tired/fatigued  Pain experienced in the last 7 days has been some  Patient's pain rating has been 2/10  Patient states that he has experienced no weight loss or gain in last 6 months  Depression Screening:   PHQ-2 Score: 0      Fall Risk Screening: In the past year, patient has experienced: no history of falling in past year      Home Safety:  Patient does not have trouble with stairs inside or outside of their home  Patient has working smoke alarms and has working carbon monoxide detector  Home safety hazards include: none  Nutrition:   Current diet is Regular, Limited junk food and Low Carb  Medications:   Patient is currently taking over-the-counter supplements  OTC medications include: see medication list  Patient is able to manage medications  Activities of Daily Living (ADLs)/Instrumental Activities of Daily Living (IADLs):   Walk and transfer into and out of bed and chair?: Yes  Dress and groom yourself?: Yes    Bathe or shower yourself?: Yes    Feed yourself? Yes  Do your laundry/housekeeping?: Yes  Manage your money, pay your bills and track your expenses?: Yes  Make your own meals?: Yes    Do your own shopping?: Yes    Previous Hospitalizations:   Any hospitalizations or ED visits within the last 12 months?: No      Advance Care Planning:   Living will: Yes    Durable POA for healthcare: Yes    Advanced directive: Yes    Advanced directive counseling given: Yes      Comments: Wife Orquidea Villegas is HCPOA  If terminally ill would not want resuscitation       PREVENTIVE SCREENINGS      Cardiovascular Screening:    General: Screening Not Indicated and History Lipid Disorder      Diabetes Screening:     General: Screening Current      Prostate Cancer Screening:    General: History Prostate Cancer and Screening Not Indicated      Lung Cancer Screening:     General: Screening Not Indicated    Screening, Brief Intervention, and Referral to Treatment (SBIRT)    Screening  Typical number of drinks in a day: 1  Typical number of drinks in a week: 7  Interpretation: Low risk drinking behavior  AUDIT-C Screenin) How often did you have a drink containing alcohol in the past year? 4 or more times a week  2) How many drinks did you have on a typical day when you were drinking in the past year? 1 to 2  3) How often did you have 6 or more drinks on one occasion in the past year? never    AUDIT-C Score: 4  Interpretation: Score 4-12 (male): POSITIVE screen for alcohol misuse    Single Item Drug Screening:  How often have you used an illegal drug (including marijuana) or a prescription medication for non-medical reasons in the past year? never    Single Item Drug Screen Score: 0  Interpretation: Negative screen for possible drug use disorder    Brief Intervention  Alcohol & drug use screenings were reviewed  No concerns regarding substance use disorder identified  Other Counseling Topics:   Car/seat belt/driving safety and regular weightbearing exercise         Robi Johnston MD

## 2022-02-15 ENCOUNTER — TELEPHONE (OUTPATIENT)
Dept: UROLOGY | Facility: AMBULATORY SURGERY CENTER | Age: 82
End: 2022-02-15

## 2022-02-15 ENCOUNTER — OFFICE VISIT (OUTPATIENT)
Dept: UROLOGY | Facility: CLINIC | Age: 82
End: 2022-02-15
Payer: MEDICARE

## 2022-02-15 VITALS
DIASTOLIC BLOOD PRESSURE: 78 MMHG | SYSTOLIC BLOOD PRESSURE: 120 MMHG | HEIGHT: 69 IN | HEART RATE: 57 BPM | WEIGHT: 181 LBS | BODY MASS INDEX: 26.81 KG/M2 | OXYGEN SATURATION: 98 %

## 2022-02-15 DIAGNOSIS — B37.42 CANDIDAL BALANITIS: Primary | ICD-10-CM

## 2022-02-15 DIAGNOSIS — B37.42 CANDIDAL BALANITIS: ICD-10-CM

## 2022-02-15 DIAGNOSIS — N48.29 INFLAMMATION OF PENIS: Primary | ICD-10-CM

## 2022-02-15 LAB
SL AMB  POCT GLUCOSE, UA: NORMAL
SL AMB LEUKOCYTE ESTERASE,UA: NORMAL
SL AMB POCT BILIRUBIN,UA: NORMAL
SL AMB POCT BLOOD,UA: NORMAL
SL AMB POCT CLARITY,UA: CLEAR
SL AMB POCT COLOR,UA: YELLOW
SL AMB POCT KETONES,UA: NORMAL
SL AMB POCT NITRITE,UA: NORMAL
SL AMB POCT PH,UA: 8
SL AMB POCT SPECIFIC GRAVITY,UA: 1
SL AMB POCT URINE PROTEIN: NORMAL
SL AMB POCT UROBILINOGEN: 0.2

## 2022-02-15 PROCEDURE — 81002 URINALYSIS NONAUTO W/O SCOPE: CPT | Performed by: PHYSICIAN ASSISTANT

## 2022-02-15 PROCEDURE — 99213 OFFICE O/P EST LOW 20 MIN: CPT | Performed by: PHYSICIAN ASSISTANT

## 2022-02-15 RX ORDER — CLOTRIMAZOLE AND BETAMETHASONE DIPROPIONATE 10; .64 MG/G; MG/G
CREAM TOPICAL 2 TIMES DAILY
Qty: 30 G | Refills: 0 | Status: SHIPPED | OUTPATIENT
Start: 2022-02-15 | End: 2022-03-10

## 2022-02-15 NOTE — TELEPHONE ENCOUNTER
Called and spoke with patient at this time  He states for the past few weeks he has been having worsening penile irritation and redness  He reports he wears shield as he has some leaking and is unsure if this is the cause  He also states he does a lot of walking  He reports he is circumcised  Advised we can have him seen today at 11am  He was agreeable and appt was scheduled

## 2022-02-15 NOTE — TELEPHONE ENCOUNTER
Patient called to say he needs and appointment as soon as possible  He stated the tip of his penis is inflamed  He stated it started a few weeks ago but its getting worse

## 2022-02-16 NOTE — PROGRESS NOTES
1  Inflammation of penis  POCT urine dip   2  Candidal balanitis  clotrimazole-betamethasone (LOTRISONE) 1-0 05 % cream           Assessment and plan:       1  Candidal balanitis  - lotrisone cream sent to pharmacy    2  Prostate cancer status post robotic prostatectomy 2009   - PSA remains undetectable  - f/u as scheduled with PSA prior to visit      Amaya Olmstead PA-C      Chief Complaint     Penile irritation    History of Present Illness     Qiana Edwards is a 81 y o  male presenting today for an acute visit for penile irritation  patient previously managed by Dr Tolbert for history of prostate cancer status post prostatectomy in 2009  Patient has been free of recurrent disease since that time and has not required any adjuvant therapy  Noticed some erythema and pruritis of his glans penis a few weeks ago  Denies any dysuria, gross hematuria, suprapubic pressure, flank pain  Has tried triple antibiotic ointment without much alleviation  Laboratory     Lab Results   Component Value Date    CREATININE 0 89 08/25/2021       Lab Results   Component Value Date    PSA <0 1 07/21/2021    PSA <0 1 07/17/2020    PSA <0 1 07/08/2019       Review of Systems     Review of Systems   Constitutional: Negative for activity change, appetite change, chills, diaphoresis, fatigue, fever and unexpected weight change  Respiratory: Negative for chest tightness and shortness of breath  Cardiovascular: Negative for chest pain, palpitations and leg swelling  Gastrointestinal: Negative for abdominal distention, abdominal pain, constipation, diarrhea, nausea and vomiting  Genitourinary: Negative for decreased urine volume, difficulty urinating, dysuria, enuresis, flank pain, frequency, genital sores, hematuria and urgency  Musculoskeletal: Negative for back pain, gait problem and myalgias  Skin: Negative for color change, pallor, rash and wound  Psychiatric/Behavioral: Negative for behavioral problems   The patient is not nervous/anxious            AUA SYMPTOM SCORE      Most Recent Value   AUA SYMPTOM SCORE    How often have you had a sensation of not emptying your bladder completely after you finished urinating? 0 (P)     How often have you had to urinate again less than two hours after you finished urinating? 1 (P)     How often have you found you stopped and started again several times when you urinate? 1 (P)     How often have you found it difficult to postpone urination? 2 (P)     How often have you had a weak urinary stream? 0 (P)     How often have you had to push or strain to begin urination? 1 (P)     How many times did you most typically get up to urinate from the time you went to bed at night until the time you got up in the morning? 5 (P)     Quality of Life: If you were to spend the rest of your life with your urinary condition just the way it is now, how would you feel about that? 1 (P)     AUA SYMPTOM SCORE 10 (P)           Allergies     Allergies   Allergen Reactions    Grass Extracts [Gramineae Pollens] Allergic Rhinitis       Physical Exam     Physical Exam      Vital Signs     Vitals:    02/15/22 1053   BP: 120/78   Pulse: 57   SpO2: 98%   Weight: 82 1 kg (181 lb)   Height: 5' 9" (1 753 m)         Current Medications       Current Outpatient Medications:     aspirin 325 mg tablet, Take 81 mg by mouth daily with dinner Blood thinner for stent, Disp: , Rfl:     atenolol (TENORMIN) 25 mg tablet, Take 1 tablet (25 mg total) by mouth daily, Disp: 90 tablet, Rfl: 3    Calcium Carbonate-Vit D-Min (CALCIUM 1200 PO), Take 1 capsule by mouth daily with breakfast, Disp: , Rfl:     Chlorpheniramine-DM 4-30 MG TABS, Take 1 capsule by mouth daily For sinus drainage, Disp: , Rfl:     cholecalciferol (VITAMIN D3) 1,000 units tablet, Take 1,000 Units by mouth 2 (two) times a day , Disp: , Rfl:     co-enzyme Q-10 30 MG capsule, Take 30 mg by mouth 3 (three) times a day, Disp: , Rfl:     CRANBERRY PO, Take by mouth daily To prevent gout, Disp: , Rfl:     diphenhydrAMINE (BENADRYL) 50 MG tablet, Take 50 mg by mouth daily at bedtime as needed for itching Sinus drainage/sleep aid, Disp: , Rfl:     fluticasone (FLONASE) 50 mcg/act nasal spray, 1 spray into each nostril daily, Disp: 15 8 mL, Rfl: 2    glucosamine-chondroitin 500-400 MG tablet, Take 1 tablet by mouth 2 (two) times a day with meals , Disp: , Rfl:     methocarbamol (ROBAXIN) 500 mg tablet, Take 1 tablet (500 mg total) by mouth 4 (four) times a day as needed for muscle spasms, Disp: 20 tablet, Rfl: 0    Misc Natural Products (Tart Cherry Advanced) CAPS, Take 1 capsule by mouth daily with breakfast 1000 mg to prevent gout, Disp: , Rfl:     niacin 100 mg tablet, Take 250 mg by mouth 2 (two) times a day before lunch and dinner To raise HDL, Disp: , Rfl:     Omega-3 Fatty Acids (fish oil) 1,000 mg, Take 1,000 mg by mouth daily, Disp: , Rfl:     rosuvastatin (CRESTOR) 5 mg tablet, Take 1 tablet (5 mg total) by mouth daily at bedtime, Disp: 90 tablet, Rfl: 3    Zinc 50 MG CAPS, Take 1 capsule by mouth daily, Disp: , Rfl:     Ascorbic Acid (vitamin C) 1000 MG tablet, Take 1,000 mg by mouth daily with breakfast, Disp: , Rfl:     Biotin 92813 MCG TABS, Take 1 capsule by mouth daily with dinner, Disp: , Rfl:     clotrimazole-betamethasone (LOTRISONE) 1-0 05 % cream, Apply topically 2 (two) times a day for 14 days, Disp: 30 g, Rfl: 0      Active Problems     Patient Active Problem List   Diagnosis    Prostate cancer ([facility] [address] )    Essential hypertension    Mixed hyperlipidemia    Coronary artery disease involving native coronary artery of native heart without angina pectoris    Sciatic pain, left    Postnasal drip         Past Medical History     Past Medical History:   Diagnosis Date    Erectile dysfunction     Heart disease     Prostate cancer [facility])          Surgical History     Past Surgical History:   Procedure Laterality Date    CAROTID STENT      HERNIA REPAIR      PROSTATECTOMY           Family History     Family History   Problem Relation Age of Onset    Prostate cancer Father          Social History     Social History       Radiology

## 2022-02-24 ENCOUNTER — TELEPHONE (OUTPATIENT)
Dept: FAMILY MEDICINE CLINIC | Facility: CLINIC | Age: 82
End: 2022-02-24

## 2022-02-24 DIAGNOSIS — I10 ESSENTIAL HYPERTENSION: ICD-10-CM

## 2022-02-24 DIAGNOSIS — I25.10 CORONARY ARTERY DISEASE INVOLVING NATIVE CORONARY ARTERY OF NATIVE HEART WITHOUT ANGINA PECTORIS: Primary | ICD-10-CM

## 2022-02-24 NOTE — TELEPHONE ENCOUNTER
Patient is scheduled in March with Dr Kassie Patel for FUP appt & wants to know if you want to add any additional labs? His cardiologist who is not in our network ordered a lipid panel, ALT & AST   He is going soon for labs & wants to know if wants anything additional?

## 2022-02-28 ENCOUNTER — APPOINTMENT (OUTPATIENT)
Dept: LAB | Facility: CLINIC | Age: 82
End: 2022-02-28
Payer: MEDICARE

## 2022-02-28 DIAGNOSIS — I10 ESSENTIAL HYPERTENSION, MALIGNANT: ICD-10-CM

## 2022-02-28 DIAGNOSIS — E78.5 HYPERLIPOPROTEINEMIA: ICD-10-CM

## 2022-02-28 DIAGNOSIS — I10 ESSENTIAL HYPERTENSION: ICD-10-CM

## 2022-02-28 DIAGNOSIS — I25.10 DISEASE OF CARDIOVASCULAR SYSTEM: ICD-10-CM

## 2022-02-28 LAB
ALT SERPL W P-5'-P-CCNC: 28 U/L (ref 12–78)
ANION GAP SERPL CALCULATED.3IONS-SCNC: 4 MMOL/L (ref 4–13)
AST SERPL W P-5'-P-CCNC: 20 U/L (ref 5–45)
BUN SERPL-MCNC: 23 MG/DL (ref 5–25)
CALCIUM SERPL-MCNC: 9.2 MG/DL (ref 8.3–10.1)
CHLORIDE SERPL-SCNC: 110 MMOL/L (ref 100–108)
CO2 SERPL-SCNC: 27 MMOL/L (ref 21–32)
CREAT SERPL-MCNC: 1.15 MG/DL (ref 0.6–1.3)
GFR SERPL CREATININE-BSD FRML MDRD: 59 ML/MIN/1.73SQ M
GLUCOSE P FAST SERPL-MCNC: 100 MG/DL (ref 65–99)
POTASSIUM SERPL-SCNC: 3.9 MMOL/L (ref 3.5–5.3)
SODIUM SERPL-SCNC: 141 MMOL/L (ref 136–145)

## 2022-02-28 PROCEDURE — 80048 BASIC METABOLIC PNL TOTAL CA: CPT

## 2022-02-28 PROCEDURE — 84460 ALANINE AMINO (ALT) (SGPT): CPT

## 2022-02-28 PROCEDURE — 84450 TRANSFERASE (AST) (SGOT): CPT

## 2022-02-28 PROCEDURE — 36415 COLL VENOUS BLD VENIPUNCTURE: CPT

## 2022-03-09 NOTE — ASSESSMENT & PLAN NOTE
BP Readings from Last 3 Encounters:   02/15/22 120/78   09/10/21 122/60   08/02/21 130/82     Controlled, continue atenolol 25 mg daily

## 2022-03-09 NOTE — PROGRESS NOTES
FAMILY MEDICINE PROGRESS NOTE    Date of Service: 03/10/22  Primary Care Provider:   Alejo Garcia MD       Name: Lottie Johnson       : 1940       Age:82 y o  Sex: male      MRN: [id number]      Chief Complaint:Follow-up       ASSESSMENT and PLAN:  Lottie Johnson is a 81 y o  male with:     Problem List Items Addressed This Visit        Cardiovascular and Mediastinum    Essential hypertension - Primary     BP Readings from Last 3 Encounters:   02/15/22 120/78   09/10/21 122/60   21 130/82     Controlled, continue atenolol 25 mg daily         Coronary artery disease involving native coronary artery of native heart without angina pectoris     Continue current medications, now on baby aspirin twice daily            Genitourinary    Prostate cancer [facility])     Lab Results   Component Value Date    PSA <0 1 2021    PSA <0 1 2020    PSA <0 1 2019     Stable PSA, no urinary symptoms, follows with urology            Other    Mixed hyperlipidemia     Lab Results   Component Value Date    HDL 39 (L) 2022    LDLCALC 58 2022    TRIG 81 2022     Controlled, continue rosuvastatin 5 mg daily  He also takes niacin to help raise HDL, omega-3 supplementation          Postnasal drip     He continues to use benadryl regularly, states this is the only medication that is helpful for symptoms of rhinorrhea, post nasal drip as needed  He does use flonase as well, continue use of flonase  Recommended nasal rinse, will trial nasal antihistamine with Astelin  Relevant Medications    azelastine (ASTELIN) 0 1 % nasal spray      Other Visit Diagnoses     Seasonal allergic rhinitis due to pollen        Relevant Medications    azelastine (ASTELIN) 0 1 % nasal spray          SUBJECTIVE:  Lottie Johnson is a 81 y o  male who presents today with a chief complaint of Follow-up  HPI     Hypertension/Coronary Artery   He is on atenolol 25 mg daily, as well as statin with rosuvastatin 5 mg  He sees cardiology, Dr Shanice Zarco  He had a stent placed in 2002  He was on Plavix for a long time and is now on aspirin 81 mg twice daily  He takes niacin, omega 3s as well to help with HDL  He has not been as active recently  Allergies  He was using Flonase for post-nasal drip  He continues to use benadryl at night, not every night  If his nose drains  He reports that 2nd generation antihistamine are not helpful  Review of Systems   Constitutional: Negative for activity change, appetite change, chills and fever  HENT: Positive for postnasal drip and rhinorrhea  Negative for congestion  Eyes: Negative for visual disturbance  Respiratory: Negative for shortness of breath  Cardiovascular: Negative for chest pain, palpitations and leg swelling  Genitourinary: Positive for frequency  Negative for difficulty urinating and urgency  Musculoskeletal: Negative for back pain and gait problem  Skin: Positive for rash  I have reviewed the patient's Past Medical History      Current Outpatient Medications:     atenolol (TENORMIN) 25 mg tablet, Take 1 tablet (25 mg total) by mouth daily, Disp: 90 tablet, Rfl: 3    Calcium Carbonate-Vit D-Min (CALCIUM 1200 PO), Take 1 capsule by mouth daily with breakfast, Disp: , Rfl:     Chlorpheniramine-DM 4-30 MG TABS, Take 1 capsule by mouth daily For sinus drainage, Disp: , Rfl:     cholecalciferol (VITAMIN D3) 1,000 units tablet, Take 1,000 Units by mouth 2 (two) times a day , Disp: , Rfl:     clotrimazole-betamethasone (LOTRISONE) 1-0 05 % cream, Apply topically 2 (two) times a day for 14 days, Disp: 30 g, Rfl: 0    co-enzyme Q-10 30 MG capsule, Take 30 mg by mouth 3 (three) times a day, Disp: , Rfl:     CRANBERRY PO, Take by mouth daily To prevent gout, Disp: , Rfl:     diphenhydrAMINE (BENADRYL) 50 MG tablet, Take 50 mg by mouth daily at bedtime as needed for itching Sinus drainage/sleep aid, Disp: , Rfl:     fluticasone (FLONASE) 50 mcg/act nasal spray, 1 spray into each nostril daily, Disp: 15 8 mL, Rfl: 2    glucosamine-chondroitin 500-400 MG tablet, Take 1 tablet by mouth 2 (two) times a day with meals , Disp: , Rfl:     methocarbamol (ROBAXIN) 500 mg tablet, Take 1 tablet (500 mg total) by mouth 4 (four) times a day as needed for muscle spasms, Disp: 20 tablet, Rfl: 0    Misc Natural Products (Tart Cherry Advanced) CAPS, Take 1 capsule by mouth daily with breakfast 1000 mg to prevent gout, Disp: , Rfl:     niacin 100 mg tablet, Take 250 mg by mouth 2 (two) times a day before lunch and dinner To raise HDL, Disp: , Rfl:     Omega-3 Fatty Acids (fish oil) 1,000 mg, Take 1,000 mg by mouth daily, Disp: , Rfl:     rosuvastatin (CRESTOR) 5 mg tablet, Take 1 tablet (5 mg total) by mouth daily at bedtime, Disp: 90 tablet, Rfl: 3    Zinc 50 MG CAPS, Take 1 capsule by mouth daily, Disp: , Rfl:     aspirin 325 mg tablet, Take 81 mg by mouth daily with dinner Blood thinner for stent, Disp: , Rfl:     azelastine (ASTELIN) 0 1 % nasal spray, 1 spray into each nostril 2 (two) times a day Use in each nostril as directed, Disp: 30 mL, Rfl: 1    OBJECTIVE:  /70   Pulse 72   Temp (!) 95 7 °F (35 4 °C)   Resp 16   Ht 5' 9" (1 753 m)   Wt 81 2 kg (179 lb)   BMI 26 43 kg/m²    BP Readings from Last 3 Encounters:   03/10/22 122/70   02/15/22 120/78   09/10/21 122/60      Wt Readings from Last 3 Encounters:   03/10/22 81 2 kg (179 lb)   02/15/22 82 1 kg (181 lb)   09/10/21 81 6 kg (180 lb)      Physical Exam  Constitutional:       General: He is not in acute distress  Appearance: Normal appearance  He is not ill-appearing or toxic-appearing  HENT:      Head: Normocephalic and atraumatic  Right Ear: Tympanic membrane, ear canal and external ear normal       Left Ear: Tympanic membrane, ear canal and external ear normal    Eyes:      Extraocular Movements: Extraocular movements intact        Conjunctiva/sclera: Conjunctivae normal  Cardiovascular:      Rate and Rhythm: Normal rate and regular rhythm  Pulses: Normal pulses  Heart sounds: Normal heart sounds  No murmur heard  No friction rub  No gallop  Pulmonary:      Effort: Pulmonary effort is normal  No respiratory distress  Breath sounds: Normal breath sounds  No wheezing or rales  Musculoskeletal:      Cervical back: Normal range of motion and neck supple  No rigidity  Right lower leg: No edema  Left lower leg: No edema  Skin:     Findings: No erythema or rash  Neurological:      General: No focal deficit present  Mental Status: He is alert and oriented to person, place, and time  Psychiatric:         Mood and Affect: Mood normal          Behavior: Behavior normal          Lab Results   Component Value Date    SODIUM 141 02/28/2022    K 3 9 02/28/2022     (H) 02/28/2022    CO2 27 02/28/2022    BUN 23 02/28/2022    CREATININE 1 15 02/28/2022    CALCIUM 9 2 02/28/2022     Lab Results   Component Value Date    CHOLESTEROL 113 02/28/2022    CHOLESTEROL 102 08/25/2021     Lab Results   Component Value Date    HDL 39 (L) 02/28/2022    HDL 39 (L) 08/25/2021     Lab Results   Component Value Date    TRIG 81 02/28/2022    TRIG 59 08/25/2021     Lab Results   Component Value Date    NONHDLC 74 02/28/2022     Lab Results   Component Value Date    LDLCALC 58 02/28/2022     Lab Results   Component Value Date    ALT 28 02/28/2022    AST 20 02/28/2022       BMI Counseling: Body mass index is 26 43 kg/m²  The BMI is above normal  Nutrition recommendations include decreasing portion sizes, encouraging healthy choices of fruits and vegetables, consuming healthier snacks and reducing intake of saturated and trans fat  Exercise recommendations include moderate physical activity 150 minutes/week  Rationale for BMI follow-up plan is due to patient being overweight or obese  Return in about 6 months (around 9/10/2022)      Cliff Carranza MD    Note: Portions of the record have been created with voice recognition software  Occasional wrong word or "sound a like" substitutions may have occurred due to the inherent limitations of voice recognition software  Read the chart carefully and recognize, using context, where substitutions have occurred

## 2022-03-09 NOTE — ASSESSMENT & PLAN NOTE
Lab Results   Component Value Date    HDL 39 (L) 02/28/2022    LDLCALC 58 02/28/2022    TRIG 81 02/28/2022     Controlled, continue rosuvastatin 5 mg daily  He also takes niacin to help raise HDL, omega-3 supplementation

## 2022-03-10 ENCOUNTER — OFFICE VISIT (OUTPATIENT)
Dept: FAMILY MEDICINE CLINIC | Facility: CLINIC | Age: 82
End: 2022-03-10
Payer: MEDICARE

## 2022-03-10 VITALS
WEIGHT: 179 LBS | SYSTOLIC BLOOD PRESSURE: 122 MMHG | HEART RATE: 72 BPM | BODY MASS INDEX: 26.51 KG/M2 | RESPIRATION RATE: 16 BRPM | HEIGHT: 69 IN | TEMPERATURE: 95.7 F | DIASTOLIC BLOOD PRESSURE: 70 MMHG

## 2022-03-10 DIAGNOSIS — I10 ESSENTIAL HYPERTENSION: Primary | ICD-10-CM

## 2022-03-10 DIAGNOSIS — I25.10 CORONARY ARTERY DISEASE INVOLVING NATIVE CORONARY ARTERY OF NATIVE HEART WITHOUT ANGINA PECTORIS: ICD-10-CM

## 2022-03-10 DIAGNOSIS — C61 PROSTATE CANCER (HCC): ICD-10-CM

## 2022-03-10 DIAGNOSIS — R09.82 POSTNASAL DRIP: ICD-10-CM

## 2022-03-10 DIAGNOSIS — E78.2 MIXED HYPERLIPIDEMIA: ICD-10-CM

## 2022-03-10 DIAGNOSIS — J30.1 SEASONAL ALLERGIC RHINITIS DUE TO POLLEN: ICD-10-CM

## 2022-03-10 PROCEDURE — 99214 OFFICE O/P EST MOD 30 MIN: CPT | Performed by: FAMILY MEDICINE

## 2022-03-10 RX ORDER — AZELASTINE 1 MG/ML
1 SPRAY, METERED NASAL 2 TIMES DAILY
Qty: 30 ML | Refills: 1 | Status: SHIPPED | OUTPATIENT
Start: 2022-03-10

## 2022-03-10 NOTE — ASSESSMENT & PLAN NOTE
He continues to use benadryl regularly, states this is the only medication that is helpful for symptoms of rhinorrhea, post nasal drip as needed  He does use flonase as well, continue use of flonase  Recommended nasal rinse, will trial nasal antihistamine with Astelin

## 2022-03-23 ENCOUNTER — OFFICE VISIT (OUTPATIENT)
Dept: UROLOGY | Facility: CLINIC | Age: 82
End: 2022-03-23
Payer: MEDICARE

## 2022-03-23 VITALS
OXYGEN SATURATION: 96 % | BODY MASS INDEX: 26.81 KG/M2 | DIASTOLIC BLOOD PRESSURE: 72 MMHG | SYSTOLIC BLOOD PRESSURE: 138 MMHG | HEART RATE: 75 BPM | RESPIRATION RATE: 16 BRPM | HEIGHT: 69 IN | WEIGHT: 181 LBS

## 2022-03-23 DIAGNOSIS — B37.42 CANDIDAL BALANITIS: Primary | ICD-10-CM

## 2022-03-23 PROCEDURE — 99213 OFFICE O/P EST LOW 20 MIN: CPT | Performed by: PHYSICIAN ASSISTANT

## 2022-03-23 RX ORDER — FLUCONAZOLE 150 MG/1
150 TABLET ORAL ONCE
Qty: 2 TABLET | Refills: 0 | Status: SHIPPED | OUTPATIENT
Start: 2022-03-23 | End: 2022-03-23

## 2022-03-23 NOTE — PROGRESS NOTES
1  Candidal balanitis  fluconazole (DIFLUCAN) 150 mg tablet         Assessment and plan:       1  Candidal balanitis  - ongoing despite topical Lotrisone  - Diflucan  - if does not improve over the next few weeks, contact the office and will discuss potential biopsy and referral to Derm    2  Prostate cancer status post robotic prostatectomy 2009   - PSA remains undetectable  - f/u as scheduled with PSA prior to visit      Vibha , NAVID      Chief Complaint     Penile irritation    History of Present Illness     Lenin Somers is a 81 y o  male presenting today for an acute visit for penile irritation  patient previously managed by Dr Tolbert for history of prostate cancer status post prostatectomy in 2009  Patient has been free of recurrent disease since that time and has not required any adjuvant therapy  Noticed some erythema and pruritis of his glans penis a few weeks ago  Denies any dysuria, gross hematuria, suprapubic pressure, flank pain  Has tried triple antibiotic ointment without much alleviation  At his last visit he was transition to Lotrisone cream   He noticed only minimal improvement and ongoing symptoms  He has used over-the-counter powder in his inguinal folds with some improvement of the pruritus  nondiabetic    Laboratory     Lab Results   Component Value Date    CREATININE 1 15 02/28/2022       Lab Results   Component Value Date    PSA <0 1 07/21/2021    PSA <0 1 07/17/2020    PSA <0 1 07/08/2019       Review of Systems     Review of Systems   Constitutional: Negative for activity change, appetite change, chills, diaphoresis, fatigue, fever and unexpected weight change  Respiratory: Negative for chest tightness and shortness of breath  Cardiovascular: Negative for chest pain, palpitations and leg swelling  Gastrointestinal: Negative for abdominal distention, abdominal pain, constipation, diarrhea, nausea and vomiting     Genitourinary: Negative for decreased urine volume, difficulty urinating, dysuria, enuresis, flank pain, frequency, genital sores, hematuria and urgency  Penile rash   Musculoskeletal: Negative for back pain, gait problem and myalgias  Skin: Negative for color change, pallor, rash and wound  Psychiatric/Behavioral: Negative for behavioral problems  The patient is not nervous/anxious  AUA SYMPTOM SCORE      Most Recent Value   AUA SYMPTOM SCORE    How often have you had a sensation of not emptying your bladder completely after you finished urinating? 0 (P)     How often have you had to urinate again less than two hours after you finished urinating? 1 (P)     How often have you found you stopped and started again several times when you urinate? 1 (P)     How often have you found it difficult to postpone urination? 2 (P)     How often have you had a weak urinary stream? 0 (P)     How often have you had to push or strain to begin urination? 1 (P)     How many times did you most typically get up to urinate from the time you went to bed at night until the time you got up in the morning? 5 (P)     Quality of Life: If you were to spend the rest of your life with your urinary condition just the way it is now, how would you feel about that? 1 (P)     AUA SYMPTOM SCORE 10 (P)           Allergies     Allergies   Allergen Reactions    Grass Extracts [Gramineae Pollens] Allergic Rhinitis       Physical Exam     Physical Exam  Constitutional:       General: He is not in acute distress  Appearance: Normal appearance  He is normal weight  He is not ill-appearing, toxic-appearing or diaphoretic  HENT:      Head: Normocephalic and atraumatic  Eyes:      General:         Right eye: No discharge  Left eye: No discharge  Conjunctiva/sclera: Conjunctivae normal    Pulmonary:      Effort: Pulmonary effort is normal  No respiratory distress  Genitourinary:     Comments: Glans penis with satalite erythema  No ulcerations or drainage  Urethral meatus patent  Dense pubic hair  Skin:     General: Skin is warm and dry  Coloration: Skin is not jaundiced or pale  Neurological:      General: No focal deficit present  Mental Status: He is alert and oriented to person, place, and time  Psychiatric:         Mood and Affect: Mood normal          Behavior: Behavior normal          Thought Content:  Thought content normal            Vital Signs     Vitals:    03/23/22 0736   BP: 138/72   BP Location: Left arm   Patient Position: Sitting   Cuff Size: Large   Pulse: 75   Resp: 16   SpO2: 96%   Weight: 82 1 kg (181 lb)   Height: 5' 9" (1 753 m)         Current Medications       Current Outpatient Medications:     atenolol (TENORMIN) 25 mg tablet, Take 1 tablet (25 mg total) by mouth daily, Disp: 90 tablet, Rfl: 3    azelastine (ASTELIN) 0 1 % nasal spray, 1 spray into each nostril 2 (two) times a day Use in each nostril as directed, Disp: 30 mL, Rfl: 1    Calcium Carbonate-Vit D-Min (CALCIUM 1200 PO), Take 1 capsule by mouth daily with breakfast, Disp: , Rfl:     cholecalciferol (VITAMIN D3) 1,000 units tablet, Take 1,000 Units by mouth 2 (two) times a day , Disp: , Rfl:     co-enzyme Q-10 30 MG capsule, Take 30 mg by mouth 3 (three) times a day, Disp: , Rfl:     CRANBERRY PO, Take by mouth daily To prevent gout, Disp: , Rfl:     diphenhydrAMINE (BENADRYL) 50 MG tablet, Take 50 mg by mouth daily at bedtime as needed for itching Sinus drainage/sleep aid, Disp: , Rfl:     glucosamine-chondroitin 500-400 MG tablet, Take 1 tablet by mouth 2 (two) times a day with meals , Disp: , Rfl:     Misc Natural Products (Tart Cherry Advanced) CAPS, Take 1 capsule by mouth daily with breakfast 1000 mg to prevent gout, Disp: , Rfl:     Omega-3 Fatty Acids (fish oil) 1,000 mg, Take 1,000 mg by mouth daily, Disp: , Rfl:     rosuvastatin (CRESTOR) 5 mg tablet, Take 1 tablet (5 mg total) by mouth daily at bedtime, Disp: 90 tablet, Rfl: 3    Zinc 50 MG CAPS, Take 1 capsule by mouth daily, Disp: , Rfl:     aspirin 325 mg tablet, Take 81 mg by mouth daily with dinner Blood thinner for stent, Disp: , Rfl:     Chlorpheniramine-DM 4-30 MG TABS, Take 1 capsule by mouth daily For sinus drainage, Disp: , Rfl:     clotrimazole-betamethasone (LOTRISONE) 1-0 05 % cream, Apply topically 2 (two) times a day for 14 days, Disp: 30 g, Rfl: 0    fluconazole (DIFLUCAN) 150 mg tablet, Take 1 tablet (150 mg total) by mouth once for 1 dose, Disp: 2 tablet, Rfl: 0    fluticasone (FLONASE) 50 mcg/act nasal spray, 1 spray into each nostril daily, Disp: 15 8 mL, Rfl: 2    methocarbamol (ROBAXIN) 500 mg tablet, Take 1 tablet (500 mg total) by mouth 4 (four) times a day as needed for muscle spasms, Disp: 20 tablet, Rfl: 0    niacin 100 mg tablet, Take 250 mg by mouth 2 (two) times a day before lunch and dinner To raise HDL, Disp: , Rfl:       Active Problems     Patient Active Problem List   Diagnosis    [facility] )    Essential hypertension    Mixed hyperlipidemia    Coronary artery disease involving native coronary artery of native heart without angina pectoris    Sciatic pain, left    Postnasal drip         Past Medical History     Past Medical History:   Diagnosis Date    Erectile dysfunction     Heart disease     [facility]          Surgical History     Past Surgical History:   Procedure Laterality Date    CAROTID STENT      HERNIA REPAIR      PROSTATECTOMY           Family History     Family History   Problem Relation Age of Onset    Prostate cancer Father          Social History     Social History       Radiology

## 2022-04-18 NOTE — TELEPHONE ENCOUNTER
Per office visit on 3/23/22:    1   Candidal balanitis  - ongoing despite topical Lotrisone  - Diflucan  - if does not improve over the next few weeks, contact the office and will discuss potential biopsy and referral to [facility]

## 2022-04-18 NOTE — TELEPHONE ENCOUNTER
Pt called back in today  PT had an appt with [facility] on 3/23  Pt said his condition has not gotten any better  Pt stated that the tip of his penis is more red than before and that it is much more sore  Pt stated the "over the counter cream" he was told to get did not help, and when he uses the cream it makes it worse  Pt denies any fever       Pt is requesting seeing a MD     Please triage and call back pt at either his home or cell number

## 2022-04-19 NOTE — TELEPHONE ENCOUNTER
Spoke with patient and informed him derm referral was placed but unfortunately can be a long wait  He still requested to see MD in the meantime  The soonest appt found is 5/9/22  Patient states he really does not feel he can wait that long since it is very bothersome  He is willing to see Abraham Ayon to see if he has any advice  Scheduled patient to see Anselmo Clark on 4/22/22  Patient thankful for appt

## 2022-04-22 ENCOUNTER — OFFICE VISIT (OUTPATIENT)
Dept: UROLOGY | Facility: CLINIC | Age: 82
End: 2022-04-22
Payer: MEDICARE

## 2022-04-22 VITALS
DIASTOLIC BLOOD PRESSURE: 72 MMHG | SYSTOLIC BLOOD PRESSURE: 134 MMHG | BODY MASS INDEX: 26.66 KG/M2 | HEIGHT: 69 IN | WEIGHT: 180 LBS

## 2022-04-22 DIAGNOSIS — R21 PENILE RASH: Primary | ICD-10-CM

## 2022-04-22 PROCEDURE — 99213 OFFICE O/P EST LOW 20 MIN: CPT | Performed by: PHYSICIAN ASSISTANT

## 2022-05-17 NOTE — DISCHARGE INSTRUCTIONS
Head Injury   WHAT YOU NEED TO KNOW:   A head injury is most often caused by a blow to the head  This may occur from a fall, bicycle injury, sports injury, being struck in the head, or a motor vehicle accident  DISCHARGE INSTRUCTIONS:   Call 911 or have someone else call for any of the following:   · You cannot be woken  · You have a seizure  · You stop responding to others or you faint  · You have blurry or double vision  · Your speech becomes slurred or confused  · You have arm or leg weakness, loss of feeling, or new problems with coordination  · Your pupils are larger than usual or one pupil is a different size than the other  · You have blood or clear fluid coming out of your ears or nose  Seek care immediately if:   · You have repeated or forceful vomiting  · You feel confused  · Your headache gets worse or becomes severe  · You or someone caring for you notices that you are harder to wake than usual   Contact your healthcare provider if:   · Your symptoms last longer than 6 weeks after the injury  · You have questions or concerns about your condition or care  Medicines:   · Acetaminophen  decreases pain  Acetaminophen is available without a doctor's order  Ask how much to take and how often to take it  Follow directions  Acetaminophen can cause liver damage if not taken correctly  · Take your medicine as directed  Contact your healthcare provider if you think your medicine is not helping or if you have side effects  Tell him or her if you are allergic to any medicine  Keep a list of the medicines, vitamins, and herbs you take  Include the amounts, and when and why you take them  Bring the list or the pill bottles to follow-up visits  Carry your medicine list with you in case of an emergency  Self-care:   · Rest  or do quiet activities for 24 to 48 hours  Limit your time watching TV, using the computer, or doing tasks that require a lot of thinking   Slowly [Good] : ~his/her~  mood as  good [Never] : Never return to your normal activities as directed  Do not play sports or do activities that may cause you to get hit in the head  Ask your healthcare provider when you can return to sports  · Apply ice  on your head for 15 to 20 minutes every hour or as directed  Use an ice pack, or put crushed ice in a plastic bag  Cover it with a towel before you apply it to your skin  Ice helps prevent tissue damage and decreases swelling and pain  · Have someone stay with you for 24 hours  or as directed  This person can monitor you for complications and call [phone]  When you are awake the person should ask you a few questions to see if you are thinking clearly  An example would be to ask your name or your address  Prevent another head injury:   · Wear a helmet that fits properly  Do this when you play sports, or ride a bike, scooter, or skateboard  Helmets help decrease your risk of a serious head injury  Talk to your healthcare provider about other ways you can protect yourself if you play sports  · Wear your seat belt every time you are in a car  This helps to decrease your risk for a head injury if you are in a car accident  Follow up with your healthcare provider as directed:  Write down your questions so you remember to ask them during your visits  © 2017 [employer] Information is for End User's use only and may not be sold, redistributed or otherwise used for commercial purposes  All illustrations and images included in CareNotes® are the copyrighted property of A D A M [employer] Inc  or Srinivasa Demarco  The above information is an  only  It is not intended as medical advice for individual conditions or treatments  Talk to your doctor, nurse or pharmacist before following any medical regimen to see if it is safe and effective for you  Muscle Strain   WHAT YOU NEED TO KNOW:   A muscle strain is a twist, pull, or tear of a muscle or tendon   A tendon is a strong elastic tissue [Yes] : Yes that connects a muscle to a bone  Signs of a strained muscle include bruising and swelling over the area, pain with movement, and loss of strength  DISCHARGE INSTRUCTIONS:   Seek care immediately or call 911 if:   · You suddenly cannot feel or move your injured muscle  Contact your healthcare provider if:   · Your pain and swelling worsen or do not go away  · You have questions or concerns about your condition or care  Medicines:   · NSAIDs , such as ibuprofen, help decrease swelling, pain, and fever  This medicine is available with or without a doctor's order  NSAIDs can cause stomach bleeding or kidney problems in certain people  If you take blood thinner medicine, always ask your healthcare provider if NSAIDs are safe for you  Always read the medicine label and follow directions  · Muscle relaxers  help decrease pain and muscle spasms  · Take your medicine as directed  Contact your healthcare provider if you think your medicine is not helping or if you have side effects  Tell him or her if you are allergic to any medicine  Keep a list of the medicines, vitamins, and herbs you take  Include the amounts, and when and why you take them  Bring the list or the pill bottles to follow-up visits  Carry your medicine list with you in case of an emergency  Follow up with your healthcare provider as directed: Your healthcare provider may suggest that you have a follow-up visit before you go back to your usual activity  Write down your questions so you remember to ask them during your visits  Self-care:   · 3 to 7 days after the injury:  Use Rest, Ice, Compression, and Elevation (RICE) to help stop bruising and decrease pain and swelling  ¨ Rest:  Rest your muscle to allow your injury to heal  When the pain decreases, begin normal, slow movements  For mild and moderate muscle strains, you should rest your muscles for about 2 days   However, if you have a severe muscle strain, you should rest for 10 [2 - 4 times a month (2 pts)] : 2-4 times a month (2 points) to 14 days  You may need to use crutches to walk if your muscle strain is in your legs or lower body  ¨ Ice:  Put an ice pack on the injured area  Put a towel between the ice pack and your skin  Do not put the ice pack directly on your skin  You can use a package of frozen peas instead of an ice pack  ¨ Compression:  You may need to wrap an elastic bandage around the area to decrease swelling  It should be tight enough for you to feel support  Do not wrap it too tightly  ¨ Elevation:  Keep the injured muscle raised above your heart if possible  For example, if you have a strain of your lower leg muscle, lie down and prop your leg up on pillows  This helps decrease pain and swelling  · 3 to 21 days after the injury:  Start to slowly and regularly exercise your strained muscle  This will help it heal  If you feel pain, decrease how hard you are exercising  · 1 to 6 weeks after the injury:  Stretch the injured muscle  Hold the stretch for about 30 seconds  Do this 4 times a day  You may stretch the muscle until you feel a slight pull  Stop stretching if you feel pain  · 2 weeks to 6 months after the injury:  The goal of this phase is to return to the activity you were doing before the injury happened, without hurting the muscle again  · 3 weeks to 6 months after the injury:  Keep stretching and strengthening your muscles to avoid injury  Slowly increase the time and distance that you exercise  You may still have signs and symptoms of muscle strain 6 months after the injury, even if you do things to help it heal  In this case, you may need surgery on the muscle  Prevent muscle strains:   · Always wear proper shoes when you play sports:  Replace your old running shoes with new ones often if you are a runner  Use special shoe inserts or arch supports to correct leg or foot problems  Ask your healthcare provider for more information on shoe supports      · Do warm up and cool down exercises:  Do [1 or 2 (0 pts)] : 1 or 2 (0 points) stretching exercises before you work out or do sports activities  These exercises will help loosen and decrease stress on your muscles  Cool down and stretch after your workout  Do not stop and rest after a workout without cooling down first            · Keep your muscles strong with strength training exercises:  Exercises such as weight lifting and stretching exercises help keep your muscles flexible and strong  A physical therapist or  may help you with these exercises  · Slowly start your exercise or sports training program:  Follow your healthcare provider's advice on when to start exercising  Slowly increase time, distance, and how often you train  Sudden increases in how often you train may cause you to injure your muscle again  © 2017 [employer]  Information is for End User's use only and may not be sold, redistributed or otherwise used for commercial purposes  All illustrations and images included in CareNotes® are the copyrighted property of A [employer] M [employer]  or Srinivasa Demarco  The above information is an  only  It is not intended as medical advice for individual conditions or treatments  Talk to your doctor, nurse or pharmacist before following any medical regimen to see if it is safe and effective for you  Laceration   WHAT YOU NEED TO KNOW:   A laceration is an injury to the skin and the soft tissue underneath it  Lacerations happen when you are cut or hit by something  They can happen anywhere on the body  DISCHARGE INSTRUCTIONS:   Seek care immediately if:   · You have heavy bleeding or bleeding that does not stop after 10 minutes of holding firm, direct pressure over the wound  · Your wound opens up  Contact your healthcare provider if:   · You have a fever or chills  · Your laceration is red, warm, or swollen  · You have red streaks on your skin coming from your wound      · You have white or yellow drainage from the wound that [Never (0 pts)] : Never (0 points) smells bad  · You have pain that gets worse, even after treatment  · You have questions or concerns about your condition or care  Medicines:   · Prescription pain medicine  may be given  Ask how to take this medicine safely  · Antibiotics  help treat or prevent a bacterial infection  · Take your medicine as directed  Contact your healthcare provider if you think your medicine is not helping or if you have side effects  Tell him or her if you are allergic to any medicine  Keep a list of the medicines, vitamins, and herbs you take  Include the amounts, and when and why you take them  Bring the list or the pill bottles to follow-up visits  Carry your medicine list with you in case of an emergency  Care for your wound as directed:   · Do not get your wound wet  until your healthcare provider says it is okay  Do not soak your wound in water  Do not go swimming until your healthcare provider says it is okay  Carefully wash the wound with soap and water  Gently pat the area dry or allow it to air dry  · Change your bandages  when they get wet, dirty, or after washing  Apply new, clean bandages as directed  Do not apply elastic bandages or tape too tight  Do not put powders or lotions over your incision  · Apply antibiotic ointment as directed  Your healthcare provider may give you antibiotic ointment to put over your wound if you have stitches  If you have strips of tape over your incision, let them dry up and fall off on their own  If they do not fall off within 14 days, gently remove them  If you have glue over your wound, do not remove or pick at it  If your glue comes off, do not replace it with glue that you have at home  · Check your wound every day for signs of infection such as swelling, redness, or pus  Self-care:   · Apply ice  on your wound for 15 to 20 minutes every hour or as directed  Use an ice pack, or put crushed ice in a plastic bag  Cover it with a towel   Ice helps prevent [No] : In the past 12 months have you used drugs other than those required for medical reasons? No tissue damage and decreases swelling and pain  · Use a splint as directed  A splint will decrease movement and stress on your wound  It may help it heal faster  A splint may be used for lacerations over joints or areas of your body that bend  Ask your healthcare provider how to apply and remove a splint  · Decrease scarring of your wound  by applying ointments as directed  Do not apply ointments until your healthcare provider says it is okay  You may need to wait until your wound is healed  Ask which ointment to buy and how often to use it  After your wound is healed, use sunscreen over the area when you are out in the sun  You should do this for at least 6 months to 1 year after your injury  Follow up with your healthcare provider as directed: You will need to return in 3 to 14 days to have stitches or staples removed  Write down your questions so you remember to ask them during your visits  © 2017 [employer]  Information is for End User's use only and may not be sold, redistributed or otherwise used for commercial purposes  All illustrations and images included in CareNotes® are the copyrighted property of [employer]  or Srinivasa Demarco  The above information is an  only  It is not intended as medical advice for individual conditions or treatments  Talk to your doctor, nurse or pharmacist before following any medical regimen to see if it is safe and effective for you  [No falls in past year] : Patient reported no falls in the past year [0] : 2) Feeling down, depressed, or hopeless: Not at all (0) [de-identified] : socially [Audit-CScore] : 2 [de-identified] : sedentary [de-identified] : regular [[id number]] : 0 [MammogramDate] : 03/2022 [PapSmearDate] : 01/2021

## 2022-08-09 ENCOUNTER — TELEPHONE (OUTPATIENT)
Dept: OTHER | Facility: OTHER | Age: 82
End: 2022-08-09

## 2022-08-09 DIAGNOSIS — C61 PROSTATE CANCER (HCC): Primary | ICD-10-CM

## 2022-08-09 NOTE — TELEPHONE ENCOUNTER
Pt  Makenzie Baezat to get his yearly PSA test done and the order was not in the system  Could you please send over an order for him to go tomorrow  He has an appointment on Monday and said there is no reason to go to the appointment if the test doesn't get done  Can you send it over today? 24-Oct-2017

## 2022-08-10 ENCOUNTER — APPOINTMENT (OUTPATIENT)
Dept: LAB | Facility: CLINIC | Age: 82
End: 2022-08-10
Payer: MEDICARE

## 2022-08-10 DIAGNOSIS — C61 PROSTATE CANCER (HCC): ICD-10-CM

## 2022-08-10 LAB — PSA SERPL-MCNC: <0.1 NG/ML (ref 0–4)

## 2022-08-10 PROCEDURE — 84153 ASSAY OF PSA TOTAL: CPT

## 2022-08-10 PROCEDURE — 36415 COLL VENOUS BLD VENIPUNCTURE: CPT

## 2022-08-15 ENCOUNTER — OFFICE VISIT (OUTPATIENT)
Dept: UROLOGY | Facility: CLINIC | Age: 82
End: 2022-08-15
Payer: MEDICARE

## 2022-08-15 VITALS
BODY MASS INDEX: 25.48 KG/M2 | HEIGHT: 70 IN | SYSTOLIC BLOOD PRESSURE: 132 MMHG | DIASTOLIC BLOOD PRESSURE: 74 MMHG | HEART RATE: 70 BPM | WEIGHT: 178 LBS | OXYGEN SATURATION: 97 %

## 2022-08-15 DIAGNOSIS — C61 PROSTATE CANCER (HCC): Primary | ICD-10-CM

## 2022-08-15 PROCEDURE — 99213 OFFICE O/P EST LOW 20 MIN: CPT | Performed by: PHYSICIAN ASSISTANT

## 2022-08-15 NOTE — PROGRESS NOTES
1  Prostate cancer ([facility] [address] )  PSA Total, Diagnostic         Assessment and plan:       1  Prostate cancer status post robotic prostatectomy 2009   - PSA remains undetectable  - f/u 1 year PSA prior      Ashwin Fink PA-C      Chief Complaint     Prostate cancer follow up    History of Present Illness     Ria Rogers is a 81 y o  male patient previously managed by Dr Tolbert for history of prostate cancer status post prostatectomy in 2009  Patient has been free of recurrent disease since that time and has not required any adjuvant therapy  Last PSA <0 1 (8/10/22)     Has some mild stress urinary incontinence  Has had some irritation from contact dermatitis that wasn't responding to candidiasis treatment  Has been nonbothersome over the past few months  Laboratory     Lab Results   Component Value Date    CREATININE 1 15 02/28/2022       Lab Results   Component Value Date    PSA <0 1 08/10/2022    PSA <0 1 07/21/2021    PSA <0 1 07/17/2020       Review of Systems     Review of Systems   Constitutional: Negative for activity change, appetite change, chills, diaphoresis, fatigue, fever and unexpected weight change  Respiratory: Negative for chest tightness and shortness of breath  Cardiovascular: Negative for chest pain, palpitations and leg swelling  Gastrointestinal: Negative for abdominal distention, abdominal pain, constipation, diarrhea, nausea and vomiting  Genitourinary: Negative for decreased urine volume, difficulty urinating, dysuria, enuresis, flank pain, frequency, genital sores, hematuria and urgency  Penile rash   Musculoskeletal: Negative for back pain, gait problem and myalgias  Skin: Negative for color change, pallor, rash and wound  Psychiatric/Behavioral: Negative for behavioral problems  The patient is not nervous/anxious            AUA SYMPTOM SCORE      Most Recent Value   AUA SYMPTOM SCORE    How often have you had a sensation of not emptying your bladder completely after you finished urinating? 0 (P)     How often have you had to urinate again less than two hours after you finished urinating? 1 (P)     How often have you found you stopped and started again several times when you urinate? 1 (P)     How often have you found it difficult to postpone urination? 2 (P)     How often have you had a weak urinary stream? 0 (P)     How often have you had to push or strain to begin urination? 1 (P)     How many times did you most typically get up to urinate from the time you went to bed at night until the time you got up in the morning? 5 (P)     Quality of Life: If you were to spend the rest of your life with your urinary condition just the way it is now, how would you feel about that? 1 (P)     AUA SYMPTOM SCORE 10 (P)           Allergies     Allergies   Allergen Reactions    Grass Extracts [Gramineae Pollens] Allergic Rhinitis       Physical Exam     Physical Exam  Constitutional:       General: He is not in acute distress  Appearance: Normal appearance  He is normal weight  He is not ill-appearing, toxic-appearing or diaphoretic  HENT:      Head: Normocephalic and atraumatic  Eyes:      General:         Right eye: No discharge  Left eye: No discharge  Conjunctiva/sclera: Conjunctivae normal    Pulmonary:      Effort: Pulmonary effort is normal  No respiratory distress  Skin:     General: Skin is warm and dry  Coloration: Skin is not jaundiced or pale  Neurological:      General: No focal deficit present  Mental Status: He is alert and oriented to person, place, and time  Psychiatric:         Mood and Affect: Mood normal          Behavior: Behavior normal          Thought Content:  Thought content normal            Vital Signs     Vitals:    08/15/22 1049   BP: 132/74   Pulse: 70   SpO2: 97%   Weight: 80 7 kg (178 lb)   Height: 5' 10" (1 778 m)         Current Medications       Current Outpatient Medications:     atenolol (TENORMIN) 25 mg tablet, Take 1 tablet (25 mg total) by mouth daily, Disp: 90 tablet, Rfl: 3    azelastine (ASTELIN) 0 1 % nasal spray, 1 spray into each nostril 2 (two) times a day Use in each nostril as directed, Disp: 30 mL, Rfl: 1    Calcium Carbonate-Vit D-Min (CALCIUM 1200 PO), Take 1 capsule by mouth daily with breakfast, Disp: , Rfl:     cholecalciferol (VITAMIN D3) 1,000 units tablet, Take 1,000 Units by mouth 2 (two) times a day , Disp: , Rfl:     co-enzyme Q-10 30 MG capsule, Take 30 mg by mouth 3 (three) times a day, Disp: , Rfl:     CRANBERRY PO, Take by mouth daily To prevent gout, Disp: , Rfl:     diphenhydrAMINE (BENADRYL) 50 MG tablet, Take 50 mg by mouth daily at bedtime as needed for itching Sinus drainage/sleep aid, Disp: , Rfl:     glucosamine-chondroitin 500-400 MG tablet, Take 1 tablet by mouth 2 (two) times a day with meals , Disp: , Rfl:     Misc Natural Products (Tart Cherry Advanced) CAPS, Take 1 capsule by mouth daily with breakfast 1000 mg to prevent gout, Disp: , Rfl:     Omega-3 Fatty Acids (fish oil) 1,000 mg, Take 1,000 mg by mouth daily, Disp: , Rfl:     rosuvastatin (CRESTOR) 5 mg tablet, Take 1 tablet (5 mg total) by mouth daily at bedtime, Disp: 90 tablet, Rfl: 3    Zinc 50 MG CAPS, Take 1 capsule by mouth daily, Disp: , Rfl:     clotrimazole-betamethasone (LOTRISONE) 1-0 05 % cream, Apply topically 2 (two) times a day for 14 days, Disp: 30 g, Rfl: 0      Active Problems     Patient Active Problem List   Diagnosis    Prostate cancer ([facility] [address] )    Essential hypertension    Mixed hyperlipidemia    Coronary artery disease involving native coronary artery of native heart without angina pectoris    Sciatic pain, left    Postnasal drip         Past Medical History     Past Medical History:   Diagnosis Date    Erectile dysfunction     Heart disease     Prostate cancer [facility])          Surgical History     Past Surgical History:   Procedure Laterality Date    CAROTID STENT      HERNIA REPAIR  PROSTATECTOMY           Family History     Family History   Problem Relation Age of Onset    Prostate cancer Father          Social History     Social History       Radiology

## 2022-09-01 ENCOUNTER — RA CDI HCC (OUTPATIENT)
Dept: OTHER | Facility: HOSPITAL | Age: 82
End: 2022-09-01

## 2022-09-01 NOTE — PROGRESS NOTES
[address]  coding opportunities       Chart reviewed, no opportunity found: CHART REVIEWED, NO OPPORTUNITY FOUND        Patients Insurance     Medicare Insurance: Medicare

## 2022-09-12 NOTE — ASSESSMENT & PLAN NOTE
BP Readings from Last 3 Encounters:   09/13/22 130/70   08/15/22 132/74   04/22/22 134/72     Controlled, continue atenolol 25 mg daily

## 2022-09-12 NOTE — ASSESSMENT & PLAN NOTE
Follows with cardiology, Dr Cisco Manzanares  Status post stent placement in 2002  He was on Plavix for a long time and is now on aspirin 81 mg twice daily, per cardiologist  Continue beta blocker with atenolol, rosuvastatin   He also takes niacin, omega 3s to help with HDL

## 2022-09-12 NOTE — ASSESSMENT & PLAN NOTE
Lab Results   Component Value Date    PSA <0 1 08/10/2022    PSA <0 1 07/21/2021    PSA <0 1 07/17/2020     Follows with urology  Stable PSA, no urinary symptoms

## 2022-09-13 ENCOUNTER — OFFICE VISIT (OUTPATIENT)
Dept: FAMILY MEDICINE CLINIC | Facility: CLINIC | Age: 82
End: 2022-09-13
Payer: MEDICARE

## 2022-09-13 VITALS
HEIGHT: 70 IN | OXYGEN SATURATION: 96 % | DIASTOLIC BLOOD PRESSURE: 70 MMHG | SYSTOLIC BLOOD PRESSURE: 130 MMHG | HEART RATE: 64 BPM | WEIGHT: 180 LBS | TEMPERATURE: 96.7 F | BODY MASS INDEX: 25.77 KG/M2

## 2022-09-13 DIAGNOSIS — E78.2 MIXED HYPERLIPIDEMIA: ICD-10-CM

## 2022-09-13 DIAGNOSIS — I25.10 CORONARY ARTERY DISEASE INVOLVING NATIVE CORONARY ARTERY OF NATIVE HEART WITHOUT ANGINA PECTORIS: ICD-10-CM

## 2022-09-13 DIAGNOSIS — R09.82 POSTNASAL DRIP: ICD-10-CM

## 2022-09-13 DIAGNOSIS — I10 ESSENTIAL HYPERTENSION: ICD-10-CM

## 2022-09-13 DIAGNOSIS — Z13.89 ENCOUNTER FOR SCREENING FOR OTHER DISORDER: ICD-10-CM

## 2022-09-13 DIAGNOSIS — R73.01 IMPAIRED FASTING GLUCOSE: ICD-10-CM

## 2022-09-13 DIAGNOSIS — C61 PROSTATE CANCER (HCC): ICD-10-CM

## 2022-09-13 DIAGNOSIS — Z00.00 MEDICARE ANNUAL WELLNESS VISIT, SUBSEQUENT: Primary | ICD-10-CM

## 2022-09-13 PROCEDURE — G0444 DEPRESSION SCREEN ANNUAL: HCPCS | Performed by: FAMILY MEDICINE

## 2022-09-13 PROCEDURE — G0439 PPPS, SUBSEQ VISIT: HCPCS | Performed by: FAMILY MEDICINE

## 2022-09-13 RX ORDER — ATENOLOL 25 MG/1
25 TABLET ORAL DAILY
Qty: 90 TABLET | Refills: 3 | Status: SHIPPED | OUTPATIENT
Start: 2022-09-13

## 2022-09-13 RX ORDER — ASPIRIN 81 MG/1
81 TABLET ORAL 2 TIMES DAILY
COMMUNITY

## 2022-09-13 RX ORDER — ROSUVASTATIN CALCIUM 5 MG/1
5 TABLET, COATED ORAL
Qty: 90 TABLET | Refills: 3 | Status: SHIPPED | OUTPATIENT
Start: 2022-09-13

## 2022-09-13 RX ORDER — NIACIN 500 MG
500 TABLET ORAL 2 TIMES DAILY
COMMUNITY

## 2022-09-13 NOTE — ASSESSMENT & PLAN NOTE
Lab Results   Component Value Date    HGBA1C 5 6 11/18/2020    HGBA1C 5 6 06/05/2020    HGBA1C 5 8 (H) 12/06/2019     Lab Results   Component Value Date    GLUF 100 (H) 02/28/2022    LDLCALC 58 02/28/2022    CREATININE 1 15 02/28/2022     Counseled patient on the importance of lifestyle interventions, specifically discussed a whole food, plant based diet low in saturated fat and processed foods  Discussed the importance of a diet that is rich in whole grains, fruits and vegetables, beans and legumes

## 2022-09-13 NOTE — PATIENT INSTRUCTIONS
Medicare Preventive Visit Patient Instructions  Thank you for completing your Welcome to Medicare Visit or Medicare Annual Wellness Visit today  Your next wellness visit will be due in one year (9/14/2023)  The screening/preventive services that you may require over the next 5-10 years are detailed below  Some tests may not apply to you based off risk factors and/or age  Screening tests ordered at today's visit but not completed yet may show as past due  Also, please note that scanned in results may not display below  Preventive Screenings:  Service Recommendations Previous Testing/Comments   Colorectal Cancer Screening  · Colonoscopy    · Fecal Occult Blood Test (FOBT)/Fecal Immunochemical Test (FIT)  · Fecal DNA/Cologuard Test  · Flexible Sigmoidoscopy Age: 39-70 years old   Colonoscopy: every 10 years (May be performed more frequently if at higher risk)  OR  FOBT/FIT: every 1 year  OR  Cologuard: every 3 years  OR  Sigmoidoscopy: every 5 years  Screening may be recommended earlier than age 39 if at higher risk for colorectal cancer  Also, an individualized decision between you and your healthcare provider will decide whether screening between the ages of 74-80 would be appropriate   Colonoscopy: Not on file  FOBT/FIT: Not on file  Cologuard: Not on file  Sigmoidoscopy: Not on file          Prostate Cancer Screening Individualized decision between patient and health care provider in men between ages of 53-78   Medicare will cover every 12 months beginning on the day after your 50th birthday PSA: <0 1 ng/mL     History Prostate Cancer  Screening Not Indicated     Hepatitis C Screening Once for adults born between 1945 and 1965  More frequently in patients at high risk for Hepatitis C Hep C Antibody: Not on file        Diabetes Screening 1-2 times per year if you're at risk for diabetes or have pre-diabetes Fasting glucose: 100 mg/dL (2/28/2022)  A1C: 5 6 % (11/18/2020)  Screening Current   Cholesterol Screening Once every 5 years if you don't have a lipid disorder  May order more often based on risk factors  Lipid panel: 09/01/2022  Screening Not Indicated  History Lipid Disorder      Other Preventive Screenings Covered by Medicare:  1  Abdominal Aortic Aneurysm (AAA) Screening: covered once if your at risk  You're considered to be at risk if you have a family history of AAA or a male between the age of 73-68 who smoking at least 100 cigarettes in your lifetime  2  Lung Cancer Screening: covers low dose CT scan once per year if you meet all of the following conditions: (1) Age 50-69; (2) No signs or symptoms of lung cancer; (3) Current smoker or have quit smoking within the last 15 years; (4) You have a tobacco smoking history of at least 20 pack years (packs per day x number of years you smoked); (5) You get a written order from a healthcare provider  3  Glaucoma Screening: covered annually if you're considered high risk: (1) You have diabetes OR (2) Family history of glaucoma OR (3)  aged 48 and older OR (3)  American aged 72 and older  3  Osteoporosis Screening: covered every 2 years if you meet one of the following conditions: (1) Have a vertebral abnormality; (2) On glucocorticoid therapy for more than 3 months; (3) Have primary hyperparathyroidism; (4) On osteoporosis medications and need to assess response to drug therapy  5  HIV Screening: covered annually if you're between the age of 12-76  Also covered annually if you are younger than 13 and older than 72 with risk factors for HIV infection  For pregnant patients, it is covered up to 3 times per pregnancy      Immunizations:  Immunization Recommendations   Influenza Vaccine Annual influenza vaccination during flu season is recommended for all persons aged >= 6 months who do not have contraindications   Pneumococcal Vaccine   * Pneumococcal conjugate vaccine = PCV13 (Prevnar 13), PCV15 (Vaxneuvance), PCV20 (Prevnar 20)  * Pneumococcal polysaccharide vaccine = PPSV23 (Pneumovax) Adults 2364 years old: 1-3 doses may be recommended based on certain risk factors  Adults 72 years old: 1-2 doses may be recommended based off what pneumonia vaccine you previously received   Hepatitis B Vaccine 3 dose series if at intermediate or high risk (ex: diabetes, end stage renal disease, liver disease)   Tetanus (Td) Vaccine - COST NOT COVERED BY MEDICARE PART B Following completion of primary series, a booster dose should be given every 10 years to maintain immunity against tetanus  Td may also be given as tetanus wound prophylaxis  Tdap Vaccine - COST NOT COVERED BY MEDICARE PART B Recommended at least once for all adults  For pregnant patients, recommended with each pregnancy  Shingles Vaccine (Shingrix) - COST NOT COVERED BY MEDICARE PART B  2 shot series recommended in those aged 48 and above     Health Maintenance Due:  There are no preventive care reminders to display for this patient  Immunizations Due:      Topic Date Due    COVID-19 Vaccine (4 - Booster for Moderna series) 03/01/2022    Influenza Vaccine (1) 09/01/2022     Advance Directives   What are advance directives? Advance directives are legal documents that state your wishes and plans for medical care  These plans are made ahead of time in case you lose your ability to make decisions for yourself  Advance directives can apply to any medical decision, such as the treatments you want, and if you want to donate organs  What are the types of advance directives? There are many types of advance directives, and each state has rules about how to use them  You may choose a combination of any of the following:  · Living will: This is a written record of the treatment you want  You can also choose which treatments you do not want, which to limit, and which to stop at a certain time  This includes surgery, medicine, IV fluid, and tube feedings     · Durable power of  for healthcare [facility]): This is a written record that states who you want to make healthcare choices for you when you are unable to make them for yourself  This person, called a proxy, is usually a family member or a friend  You may choose more than 1 proxy  · Do not resuscitate (DNR) order:  A DNR order is used in case your heart stops beating or you stop breathing  It is a request not to have certain forms of treatment, such as CPR  A DNR order may be included in other types of advance directives  · Medical directive: This covers the care that you want if you are in a coma, near death, or unable to make decisions for yourself  You can list the treatments you want for each condition  Treatment may include pain medicine, surgery, blood transfusions, dialysis, IV or tube feedings, and a ventilator (breathing machine)  · Values history: This document has questions about your views, beliefs, and how you feel and think about life  This information can help others choose the care that you would choose  Why are advance directives important? An advance directive helps you control your care  Although spoken wishes may be used, it is better to have your wishes written down  Spoken wishes can be misunderstood, or not followed  Treatments may be given even if you do not want them  An advance directive may make it easier for your family to make difficult choices about your care  Weight Management   Why it is important to manage your weight:  Being overweight increases your risk of health conditions such as heart disease, high blood pressure, type 2 diabetes, and certain types of cancer  It can also increase your risk for osteoarthritis, sleep apnea, and other respiratory problems  Aim for a slow, steady weight loss  Even a small amount of weight loss can lower your risk of health problems  How to lose weight safely:  A safe and healthy way to lose weight is to eat fewer calories and get regular exercise   You can lose up about 1 pound a week by decreasing the number of calories you eat by 500 calories each day  Healthy meal plan for weight management:  A healthy meal plan includes a variety of foods, contains fewer calories, and helps you stay healthy  A healthy meal plan includes the following:  · Eat whole-grain foods more often  A healthy meal plan should contain fiber  Fiber is the part of grains, fruits, and vegetables that is not broken down by your body  Whole-grain foods are healthy and provide extra fiber in your diet  Some examples of whole-grain foods are whole-wheat breads and pastas, oatmeal, brown rice, and bulgur  · Eat a variety of vegetables every day  Include dark, leafy greens such as spinach, kale, maurizio greens, and mustard greens  Eat yellow and orange vegetables such as carrots, sweet potatoes, and winter squash  · Eat a variety of fruits every day  Choose fresh or canned fruit (canned in its own juice or light syrup) instead of juice  Fruit juice has very little or no fiber  · Eat low-fat dairy foods  Drink fat-free (skim) milk or 1% milk  Eat fat-free yogurt and low-fat cottage cheese  Try low-fat cheeses such as mozzarella and other reduced-fat cheeses  · Choose meat and other protein foods that are low in fat  Choose beans or other legumes such as split peas or lentils  Choose fish, skinless poultry (chicken or turkey), or lean cuts of red meat (beef or pork)  Before you cook meat or poultry, cut off any visible fat  · Use less fat and oil  Try baking foods instead of frying them  Add less fat, such as margarine, sour cream, regular salad dressing and mayonnaise to foods  Eat fewer high-fat foods  Some examples of high-fat foods include french fries, doughnuts, ice cream, and cakes  · Eat fewer sweets  Limit foods and drinks that are high in sugar  This includes candy, cookies, regular soda, and sweetened drinks  Exercise:  Exercise at least 30 minutes per day on most days of the week   Some examples of exercise include walking, biking, dancing, and swimming  You can also fit in more physical activity by taking the stairs instead of the elevator or parking farther away from stores  Ask your healthcare provider about the best exercise plan for you  © Copyright [employer] [facility] 2018 Information is for End User's use only and may not be sold, redistributed or otherwise used for commercial purposes   All illustrations and images included in CareNotes® are the copyrighted property of A D A M , Inc  or [address] [place] [address]

## 2022-09-13 NOTE — PROGRESS NOTES
Assessment and Plan:     Problem List Items Addressed This Visit        Endocrine    Impaired fasting glucose     Lab Results   Component Value Date    HGBA1C 5 6 11/18/2020    HGBA1C 5 6 06/05/2020    HGBA1C 5 8 (H) 12/06/2019     Lab Results   Component Value Date    GLUF 100 (H) 02/28/2022    [address] 59 02/28/2022    CREATININE 1 15 02/28/2022     Counseled patient on the importance of lifestyle interventions, specifically discussed a whole food, plant based diet low in saturated fat and processed foods  Discussed the importance of a diet that is rich in whole grains, fruits and vegetables, beans and legumes               Cardiovascular and Mediastinum    Essential hypertension     BP Readings from Last 3 Encounters:   09/13/22 130/70   08/15/22 132/74   04/22/22 134/72     Controlled, continue atenolol 25 mg daily         Relevant Medications    atenolol (TENORMIN) 25 mg tablet    Coronary artery disease involving native coronary artery of native heart without angina pectoris     Follows with cardiology, Dr Alessandra Woods  Status post stent placement in 2002  He was on Plavix for a long time and is now on aspirin 81 mg twice daily, per cardiologist  Continue beta blocker with atenolol, rosuvastatin   He also takes niacin, omega 3s to help with HDL          Relevant Medications    rosuvastatin (CRESTOR) 5 mg tablet    atenolol (TENORMIN) 25 mg tablet       Genitourinary    Prostate cancer ([facility] [address] )     Lab Results   Component Value Date    PSA <0 1 08/10/2022    PSA <0 1 07/21/2021    PSA <0 1 07/17/2020     Follows with urology  Stable PSA, no urinary symptoms            Other    Mixed hyperlipidemia     Controlled, continue rosuvastatin 5 mg daily         Relevant Medications    rosuvastatin (CRESTOR) 5 mg tablet    Postnasal drip     Recommended continuing with Flonase with antihistamine (Xyzal)  Typically has rhinitis when preparing food or eating  He does have worse symptoms in the spring with allergies, consider addition of Singulair           Other Visit Diagnoses     Medicare annual wellness visit, subsequent    -  Primary    Encounter for screening for other disorder              Depression Screening and Follow-up Plan: Patient was screened for depression during today's encounter  They screened negative with a PHQ-2 score of 0  Preventive health issues were discussed with patient, and age appropriate screening tests were ordered as noted in patient's After Visit Summary  Personalized health advice and appropriate referrals for health education or preventive services given if needed, as noted in patient's After Visit Summary  History of Present Illness:     Patient presents for a Medicare Wellness Visit    HPI   Patient Care Team:  Gabriele Castro MD as PCP - General (Family Medicine)  Isaac Toribio MD     Review of Systems:     Review of Systems   Constitutional: Negative for activity change, chills and fatigue  Eyes: Positive for visual disturbance  Respiratory: Negative for shortness of breath  Cardiovascular: Negative for chest pain, palpitations and leg swelling  Gastrointestinal: Negative for abdominal pain, constipation and diarrhea  Psychiatric/Behavioral: Negative for dysphoric mood and sleep disturbance  The patient is not nervous/anxious           Problem List:     Patient Active Problem List   Diagnosis    [facility]    Essential hypertension    Mixed hyperlipidemia    Coronary artery disease involving native coronary artery of native heart without angina pectoris    Sciatic pain, left    Postnasal drip    Impaired fasting glucose      Past Medical and Surgical History:     Past Medical History:   Diagnosis Date    Erectile dysfunction     Heart disease     Prostate cancer ([address] )      Past Surgical History:   Procedure Laterality Date    CAROTID STENT      HERNIA REPAIR      PROSTATECTOMY        Family History:     Family History   Problem Relation Age of Onset    Prostate cancer Father       Social History:     Social History     Socioeconomic History    Marital status: /Civil Union     Spouse name: None    Number of children: None    Years of education: None    Highest education level: None   Occupational History    None   Tobacco Use    Smoking status: Never Smoker    Smokeless tobacco: Never Used   Vaping Use    Vaping Use: Never used   Substance and Sexual Activity    Alcohol use: Yes     Alcohol/week: 7 0 standard drinks     Types: 7 Glasses of wine per week    Drug use: No    Sexual activity: None   Other Topics Concern    None   Social History Narrative    None     Social Determinants of Health     Financial Resource Strain: Low Risk     Difficulty of Paying Living Expenses: Not hard at all   Food Insecurity: Not on file   Transportation Needs: No Transportation Needs    Lack of Transportation (Medical): No    Lack of Transportation (Non-Medical):  No   Physical Activity: Not on file   Stress: Not on file   Social Connections: Not on file   Intimate Partner Violence: Not on file   Housing Stability: Not on file      Medications and Allergies:     Current Outpatient Medications   Medication Sig Dispense Refill    aspirin (ECOTRIN LOW STRENGTH) 81 mg EC tablet Take 81 mg by mouth 2 (two) times a day      atenolol (TENORMIN) 25 mg tablet Take 1 tablet (25 mg total) by mouth daily 90 tablet 3    Calcium Carbonate-Vit D-Min (CALCIUM 1200 PO) Take 1 capsule by mouth daily with breakfast      cholecalciferol (VITAMIN D3) 1,000 units tablet Take 1,000 Units by mouth 2 (two) times a day       co-enzyme Q-10 30 MG capsule Take 30 mg by mouth 3 (three) times a day      CRANBERRY PO Take by mouth daily To prevent gout      diphenhydrAMINE (BENADRYL) 50 MG tablet Take 50 mg by mouth daily at bedtime as needed for itching Sinus drainage/sleep aid      glucosamine-chondroitin 500-400 MG tablet Take 1 tablet by mouth 2 (two) times a day with meals       Misc Natural Products (Tart Cherry Advanced) CAPS Take 1 capsule by mouth daily with breakfast 1000 mg to prevent gout      niacin 500 mg tablet Take 500 mg by mouth 2 (two) times a day      Omega-3 Fatty Acids (fish oil) 1,000 mg Take 1,000 mg by mouth daily      rosuvastatin (CRESTOR) 5 mg tablet Take 1 tablet (5 mg total) by mouth daily at bedtime 90 tablet 3    Zinc 50 MG CAPS Take 1 capsule by mouth daily       No current facility-administered medications for this visit  Allergies   Allergen Reactions    Grass Extracts [Gramineae Pollens] Allergic Rhinitis      Immunizations:     Immunization History   Administered Date(s) Administered    COVID-19 MODERNA VACC 0 5 ML IM 01/14/2021, 02/11/2021, 11/01/2021    INFLUENZA 10/15/2017, 10/30/2017, 10/08/2018    Influenza Split High Dose Preservative Free IM 10/08/2018, 10/02/2019    Influenza, high dose seasonal 0 7 mL 10/13/2020, 09/10/2021    Influenza, seasonal, injectable 10/07/2010    Influenza, seasonal, injectable, preservative free 10/19/2015    Pneumococcal Conjugate 13-Valent 01/26/2015    Pneumococcal Polysaccharide PPV23 12/16/2019    Tdap 12/20/2017    Zoster 10/07/2010      Health Maintenance: There are no preventive care reminders to display for this patient  Topic Date Due    COVID-19 Vaccine (4 - Booster for Moderna series) 03/01/2022    Influenza Vaccine (1) 09/01/2022      Medicare Screening Tests and Risk Assessments:     Bere Garcia is here for his Subsequent Wellness visit  Last Medicare Wellness visit information reviewed, patient interviewed and updates made to the record today  Health Risk Assessment:   Patient rates overall health as very good  Patient feels that their physical health rating is same  Patient is satisfied with their life  Eyesight was rated as slightly worse  Hearing was rated as slightly worse  Patient feels that their emotional and mental health rating is same   Patients states they are never, rarely angry  Patient states they are never, rarely unusually tired/fatigued  Pain experienced in the last 7 days has been none  Patient states that he has experienced no weight loss or gain in last 6 months  Depression Screening:   PHQ-2 Score: 0      Fall Risk Screening: In the past year, patient has experienced: no history of falling in past year      Home Safety:  Patient does not have trouble with stairs inside or outside of their home  Patient has working smoke alarms and has working carbon monoxide detector  Home safety hazards include: none  Nutrition:   Current diet is Low Carb and Limited junk food  Medications:   Patient is currently taking over-the-counter supplements  OTC medications include: see profile done a few hours ago  Patient is able to manage medications  Activities of Daily Living (ADLs)/Instrumental Activities of Daily Living (IADLs):   Walk and transfer into and out of bed and chair?: Yes  Dress and groom yourself?: Yes    Bathe or shower yourself?: Yes    Feed yourself? Yes  Do your laundry/housekeeping?: Yes  Manage your money, pay your bills and track your expenses?: Yes  Make your own meals?: Yes    Do your own shopping?: Yes    Durable Medical Equipment Suppliers  none    Previous Hospitalizations:   Any hospitalizations or ED visits within the last 12 months?: No      Advance Care Planning:   Living will: Yes    Durable POA for healthcare:  Yes    Advanced directive: Yes    Advanced directive counseling given: Yes      Cognitive Screening:   Provider or family/friend/caregiver concerned regarding cognition?: No    PREVENTIVE SCREENINGS      Cardiovascular Screening:    General: Screening Not Indicated and History Lipid Disorder      Diabetes Screening:     General: Screening Current      Prostate Cancer Screening:    General: History Prostate Cancer and Screening Not Indicated      Lung Cancer Screening:     General: Screening Not Indicated    Screening, Brief Intervention, and Referral to Treatment (SBIRT)    Screening  Typical number of drinks in a day: 1  Typical number of drinks in a week: 5  Interpretation: Low risk drinking behavior  AUDIT-C Screenin) How often did you have a drink containing alcohol in the past year? 2 to 3 times a week  2) How many drinks did you have on a typical day when you were drinking in the past year? 1 to 2  3) How often did you have 6 or more drinks on one occasion in the past year? never    AUDIT-C Score: 3  Interpretation: Score 0-3 (male): Negative screen for alcohol misuse    Single Item Drug Screening:  How often have you used an illegal drug (including marijuana) or a prescription medication for non-medical reasons in the past year? never    Single Item Drug Screen Score: 0  Interpretation: Negative screen for possible drug use disorder    Brief Intervention  Alcohol & drug use screenings were reviewed  No concerns regarding substance use disorder identified  Healthy alcohol use/limits discussed  Other Counseling Topics:   Car/seat belt/driving safety and regular weightbearing exercise  No exam data present     Physical Exam:     /70 (BP Location: Left arm, Patient Position: Sitting, Cuff Size: Standard)   Pulse 64   Temp (!) 96 7 °F (35 9 °C)   Ht 5' 10" (1 778 m)   Wt 81 6 kg (180 lb)   SpO2 96%   BMI 25 83 kg/m²     Physical Exam  Constitutional:       General: He is not in acute distress  Appearance: Normal appearance  He is not ill-appearing or toxic-appearing  HENT:      Head: Normocephalic and atraumatic  Right Ear: Tympanic membrane, ear canal and external ear normal       Left Ear: Tympanic membrane, ear canal and external ear normal       Nose: Nose normal       Mouth/Throat:      Mouth: Mucous membranes are moist    Eyes:      Extraocular Movements: Extraocular movements intact        Conjunctiva/sclera: Conjunctivae normal    Cardiovascular:      Rate and Rhythm: Normal rate and regular rhythm  Pulses: Normal pulses  Heart sounds: Normal heart sounds  No murmur heard  No friction rub  No gallop  Pulmonary:      Effort: Pulmonary effort is normal  No respiratory distress  Breath sounds: Normal breath sounds  No stridor  No wheezing or rales  Musculoskeletal:      Cervical back: Normal range of motion and neck supple  No rigidity  Right lower leg: No edema  Left lower leg: No edema  Skin:     General: Skin is warm and dry  Findings: No erythema or rash  Neurological:      General: No focal deficit present  Mental Status: He is alert and oriented to person, place, and time     Psychiatric:         Mood and Affect: Mood normal          Behavior: Behavior normal           Werner Velasquez MD

## 2022-09-13 NOTE — ASSESSMENT & PLAN NOTE
Recommended continuing with Flonase with antihistamine (Xyzal)  Typically has rhinitis when preparing food or eating  He does have worse symptoms in the spring with allergies, consider addition of Singulair

## 2023-01-10 ENCOUNTER — TELEMEDICINE (OUTPATIENT)
Dept: FAMILY MEDICINE CLINIC | Facility: CLINIC | Age: 83
End: 2023-01-10

## 2023-01-10 DIAGNOSIS — U07.1 COVID-19: Primary | ICD-10-CM

## 2023-01-10 RX ORDER — NIRMATRELVIR AND RITONAVIR 150-100 MG
2 KIT ORAL 2 TIMES DAILY
Qty: 20 TABLET | Refills: 0 | Status: SHIPPED | OUTPATIENT
Start: 2023-01-10 | End: 2023-01-15

## 2023-01-10 NOTE — PROGRESS NOTES
COVID-19 Outpatient Progress Note    Assessment/Plan:    Problem List Items Addressed This Visit    None  Visit Diagnoses     COVID-19    -  Primary    Relevant Medications    nirmatrelvir & ritonavir (Paxlovid, 150/100,) tablet therapy pack         Disposition:     Discussed symptom directed medication options with patient  Discussed vitamin D, vitamin C, and/or zinc supplementation with patient  Reviewed isolation and masking recommendations with patient    Patient meets criteria for PAXLOVID and they have been counseled appropriately according to EUA documentation released by the FDA  After discussion, patient agrees to treatment  [address] is an investigational medicine used to treat mild-to-moderate COVID-19 in adults and children (15years of age and older weighing at least 80 pounds (40 kg)) with positive results of direct SARS-CoV-2 viral testing, and who are at high risk for progression to severe COVID-19, including hospitalization or death  PAXLOVID is investigational because it is still being studied  There is limited information about the safety and effectiveness of using PAXLOVID to treat people with mild-to-moderate COVID-19  The FDA has authorized the emergency use of PAXLOVID for the treatment of mild-tomoderate COVID-19 in adults and children (15years of age and older weighing at least 80 pounds (40 kg)) with a positive test for the virus that causes COVID-19, and who are at high risk for progression to severe COVID-19, including hospitalization or death, under an EUA  What should I tell my healthcare provider before I take PAXLOVID? Tell your healthcare provider if you:  - Have any allergies  - Have liver or kidney disease  - Are pregnant or plan to become pregnant  - Are breastfeeding a child  - Have any serious illnesses    Tell your healthcare provider about all the medicines you take, including prescription and over-the-counter medicines, vitamins, and herbal supplements  Some medicines may interact with PAXLOVID and may cause serious side effects  Keep a list of your medicines to show your healthcare provider and pharmacist when you get a new medicine  You can ask your healthcare provider or pharmacist for a list of medicines that interact with PAXLOVID  Do not start taking a new medicine without telling your healthcare provider  Your healthcare provider can tell you if it is safe to take PAXLOVID with other medicines  Tell your healthcare provider if you are taking combined hormonal contraceptive  PAXLOVID may affect how your birth control pills work  Females who are able to become pregnant should use another effective alternative form of contraception or an additional barrier method of contraception  Talk to your healthcare provider if you have any questions about contraceptive methods that might be right for you  How do I take PAXLOVID? PAXLOVID consists of 2 medicines: nirmatrelvir and ritonavir  - Take 2 pink tablets of nirmatrelvir with 1 white tablet of ritonavir by mouth 2 times each day (in the morning and in the evening) for 5 days  For each dose, take all 3 tablets at the same time  - If you have kidney disease, talk to your healthcare provider  You may need a different dose  - Swallow the tablets whole  Do not chew, break, or crush the tablets  - Take PAXLOVID with or without food  - Do not stop taking PAXLOVID without talking to your healthcare provider, even if you feel better  - If you miss a dose of PAXLOVID within 8 hours of the time it is usually taken, take it as soon as you remember  If you miss a dose by more than 8 hours, skip the missed dose and take the next dose at your regular time  Do not take 2 doses of PAXLOVID at the same time  - If you take too much PAXLOVID, call your healthcare provider or go to the nearest hospital emergency room right away    - If you are taking a ritonavir- or cobicistat-containing medicine to treat hepatitis C or Human Immunodeficiency Virus (HIV), you should continue to take your medicine as prescribed by your healthcare provider   - Talk to your healthcare provider if you do not feel better or if you feel worse after 5 days  Who should generally not take PAXLOVID? Do not take PAXLOVID if:  You are allergic to nirmatrelvir, ritonavir, or any of the ingredients in PAXLOVID  You are taking any of the following medicines:  - Alfuzosin  - Pethidine, piroxicam, propoxyphene  - Ranolazine  - Amiodarone, dronedarone, flecainide, propafenone, quinidine  - Colchicine  - Lurasidone, pimozide, clozapine  - Dihydroergotamine, ergotamine, methylergonovine  - Lovastatin, simvastatin  - Sildenafil (Revatio®) for pulmonary arterial hypertension (PAH)  - Triazolam, oral midazolam  - Apalutamide  - Carbamazepine, phenobarbital, phenytoin  - Rifampin  - St  Varun’s Wort (hypericum perforatum)    What are the important possible side effects of PAXLOVID? Possible side effects of PAXLOVID are:  - Liver Problems  Tell your healthcare provider right away if you have any of these signs and symptoms of liver problems: loss of appetite, yellowing of your skin and the whites of eyes (jaundice), dark-colored urine, pale colored stools and itchy skin, stomach area (abdominal) pain  - Resistance to HIV Medicines  If you have untreated HIV infection, PAXLOVID may lead to some HIV medicines not working as well in the future  - Other possible side effects include: altered sense of taste, diarrhea, high blood pressure, or muscle aches    These are not all the possible side effects of PAXLOVID  Not many people have taken PAXLOVID  Serious and unexpected side effects may happen  Christiano Marks is still being studied, so it is possible that all of the risks are not known at this time  What other treatment choices are there? Like Ravenden Manus may allow for the emergency use of other medicines to treat people with COVID-19   Go to https://Showkicker/ for information on the emergency use of other medicines that are authorized by FDA to treat people with COVID-19  Your healthcare provider may talk with you about clinical trials for which you may be eligible  It is your choice to be treated or not to be treated with PAXLOVID  Should you decide not to receive it or for your child not to receive it, it will not change your standard medical care  What if I am pregnant or breastfeeding? There is no experience treating pregnant women or breastfeeding mothers with PAXLOVID  For a mother and unborn baby, the benefit of taking PAXLOVID may be greater than the risk from the treatment  If you are pregnant, discuss your options and specific situation with your healthcare provider  It is recommended that you use effective barrier contraception or do not have sexual activity while taking PAXLOVID  If you are breastfeeding, discuss your options and specific situation with your healthcare provider  How do I report side effects with PAXLOVID? Contact your healthcare provider if you have any side effects that bother you or do not go away  Report side effects to FDA MedWatch at www fda gov/medwatch or call 9-356-TAQ7081 or you can report side effects to [employer]  at the contact information provided below  Website Fax number Telephone number   [employer] [phone]     How should I store [address]? Store PAXLOVID tablets at room temperature between 68°F to 77°F (20°C to 25°C)  Full fact sheet for patients, parents, and caregivers can be found at: Nina alonso    I have spent 12 minutes directly with the patient   Greater than 50% of this time was spent in counseling/coordination of care regarding: diagnostic results, prognosis, risks and benefits of treatment options, instructions for management, patient and family education, importance of treatment compliance, risk factor reductions and impressions  Patient instructed to hold rousvastatin while on the Paxilovid      Encounter provider: Betsey Koyanagi, CRNP     Provider located at: [place] [address]  [address] [facility] [address]  [phone]     Recent Visits  No visits were found meeting these conditions  Showing recent visits within past 7 days and meeting all other requirements  Today's Visits  Date Type Provider Dept   01/10/23 Telemedicine Betsey Koyanagi, [address] today's visits and meeting all other requirements  Future Appointments  No visits were found meeting these conditions  Showing future appointments within next 150 days and meeting all other requirements     This virtual check-in was done via [employer] and patient was informed that this is a secure, HIPAA-compliant platform  He agrees to proceed  Patient agrees to participate in a virtual check in via telephone or video visit instead of presenting to the office to address urgent/immediate medical needs  Patient is aware this is a billable service  He acknowledged consent and understanding of privacy and security of the video platform  The patient has agreed to participate and understands they can discontinue the visit at any time  After connecting through [facility], the patient was identified by name and date of birth  Korey Moore was informed that this was a telemedicine visit and that the exam was being conducted confidentially over secure lines  My office door was closed  No one else was in the room  Korey Moore acknowledged consent and understanding of privacy and security of the telemedicine visit  I informed the patient that I have reviewed his record in Epic and presented the opportunity for him to ask any questions regarding the visit today   The patient agreed to participate  Verification of patient location:  Patient is located in the following state in which I hold an active license: [place]    Subjective:   Abhishek Dickinson is a 81 y o  male who is concerned about COVID-19  Patient's symptoms include fever ( low grade), chills, nasal congestion, cough and headache  Patient denies fatigue, rhinorrhea, sore throat, anosmia, loss of taste, shortness of breath, chest tightness, abdominal pain, nausea, vomiting, diarrhea and myalgias  - Date of symptom onset: 1/17/2023      COVID-19 vaccination status: Fully vaccinated with booster    Exposure:   Contact with a person who is under investigation (PUI) for or who is positive for COVID-19 within the last 14 days?: No    Hospitalized recently for fever and/or lower respiratory symptoms?: No      Currently a healthcare worker that is involved in direct patient care?: No      Works in a special setting where the risk of COVID-19 transmission may be high? (this may include long-term care, correctional and FDC facilities; homeless shelters; assisted-living facilities and group homes ): No      Resident in a special setting where the risk of COVID-19 transmission may be high? (this may include long-term care, correctional and FDC facilities; homeless shelters; assisted-living facilities and group homes ): No      Lab Results   Component Value Date    [address] [facility] Not Detected 12/08/2020       Review of Systems   Constitutional: Positive for chills and fever ( low grade)  Negative for fatigue  HENT: Positive for congestion, postnasal drip and sneezing  Negative for rhinorrhea and sore throat  Eyes:        Watery eyes   Respiratory: Positive for cough  Negative for chest tightness, shortness of breath and wheezing  Cardiovascular: Negative for chest pain and palpitations  Gastrointestinal: Negative for abdominal pain, diarrhea, nausea and vomiting  Musculoskeletal: Negative for myalgias     Neurological: Positive for headaches  Negative for dizziness and light-headedness  Current Outpatient Medications on File Prior to Visit   Medication Sig   • aspirin (ECOTRIN LOW STRENGTH) 81 mg EC tablet Take 81 mg by mouth 2 (two) times a day   • atenolol (TENORMIN) 25 mg tablet Take 1 tablet (25 mg total) by mouth daily   • Calcium Carbonate-Vit D-Min (CALCIUM 1200 PO) Take 1 capsule by mouth daily with breakfast   • cholecalciferol (VITAMIN D3) 1,000 units tablet Take 1,000 Units by mouth 2 (two) times a day    • co-enzyme Q-10 30 MG capsule Take 30 mg by mouth 3 (three) times a day   • CRANBERRY PO Take by mouth daily To prevent gout   • diphenhydrAMINE (BENADRYL) 50 MG tablet Take 50 mg by mouth daily at bedtime as needed for itching Sinus drainage/sleep aid   • glucosamine-chondroitin 500-400 MG tablet Take 1 tablet by mouth 2 (two) times a day with meals    • Misc Natural Products (Tart Cherry Advanced) CAPS Take 1 capsule by mouth daily with breakfast 1000 mg to prevent gout   • niacin 500 mg tablet Take 500 mg by mouth 2 (two) times a day   • Omega-3 Fatty Acids (fish oil) 1,000 mg Take 1,000 mg by mouth daily   • rosuvastatin (CRESTOR) 5 mg tablet Take 1 tablet (5 mg total) by mouth daily at bedtime   • Zinc 50 MG CAPS Take 1 capsule by mouth daily     Objective: There were no vitals taken for this visit  (Vital signs not performed during visit due to limitations of telemedicine format along with patient's availability to needed equipment)    Physical Exam  Constitutional:       General: He is not in acute distress  Appearance: He is not ill-appearing  HENT:      Head: Normocephalic and atraumatic  Right Ear: External ear normal       Left Ear: External ear normal       Mouth/Throat:      Lips: Pink  Eyes:      General: Lids are normal       Extraocular Movements: Extraocular movements intact  Conjunctiva/sclera: Conjunctivae normal       Pupils: Pupils are equal, round, and reactive to light     Neck: Trachea: Trachea and phonation normal    Cardiovascular:      Rate and Rhythm: Normal rate  Pulmonary:      Effort: Pulmonary effort is normal  No tachypnea, bradypnea, accessory muscle usage or respiratory distress  Breath sounds: No wheezing  Skin:     Coloration: Skin is not pale  Neurological:      General: No focal deficit present  Mental Status: He is alert and oriented to person, place, and time  Psychiatric:         Mood and Affect: Mood normal          Behavior: Behavior is cooperative         [address] Teddy Blades

## 2023-04-13 PROBLEM — N18.31 STAGE 3A CHRONIC KIDNEY DISEASE (HCC): Status: ACTIVE | Noted: 2023-04-13

## 2023-04-13 PROBLEM — J30.1 SEASONAL ALLERGIC RHINITIS DUE TO POLLEN: Status: ACTIVE | Noted: 2023-04-13

## 2023-08-03 ENCOUNTER — APPOINTMENT (OUTPATIENT)
Dept: LAB | Facility: AMBULARY SURGERY CENTER | Age: 83
End: 2023-08-03
Payer: MEDICARE

## 2023-08-03 DIAGNOSIS — C61 PROSTATE CANCER (HCC): ICD-10-CM

## 2023-08-03 LAB — PSA SERPL-MCNC: <0.01 NG/ML (ref 0–4)

## 2023-08-03 PROCEDURE — 36415 COLL VENOUS BLD VENIPUNCTURE: CPT

## 2023-08-03 PROCEDURE — 84153 ASSAY OF PSA TOTAL: CPT

## 2023-08-15 ENCOUNTER — OFFICE VISIT (OUTPATIENT)
Dept: UROLOGY | Facility: CLINIC | Age: 83
End: 2023-08-15
Payer: MEDICARE

## 2023-08-15 VITALS
HEIGHT: 69 IN | SYSTOLIC BLOOD PRESSURE: 120 MMHG | WEIGHT: 178 LBS | HEART RATE: 61 BPM | BODY MASS INDEX: 26.36 KG/M2 | OXYGEN SATURATION: 96 % | DIASTOLIC BLOOD PRESSURE: 70 MMHG

## 2023-08-15 DIAGNOSIS — C61 PROSTATE CANCER (HCC): Primary | ICD-10-CM

## 2023-08-15 PROCEDURE — 99213 OFFICE O/P EST LOW 20 MIN: CPT | Performed by: PHYSICIAN ASSISTANT

## 2023-08-15 NOTE — PROGRESS NOTES
UROLOGY PROGRESS NOTE   Patient Identifiers: Bambi Romo (MRN [id number])  Date of Service: 8/15/2023    Subjective:   52-year-old man history of [place] 9 stage II prostate cancer. He had a robotic prostatectomy in 2009. PSA < 0.1. He has good urinary control without any complaints.     Reason for visit: Prostate cancer follow-up    Objective:     VITALS:    Vitals:    08/15/23 1100   BP: 120/70   Pulse: 61   SpO2: 96%     AUA SYMPTOM SCORE    Flowsheet Row Most Recent Value   AUA SYMPTOM SCORE    How often have you had a sensation of not emptying your bladder completely after you finished urinating? 1 (P)     How often have you had to urinate again less than two hours after you finished urinating? 1 (P)     How often have you found you stopped and started again several times when you urinate? 1 (P)     How often have you found it difficult to postpone urination? 1 (P)     How often have you had a weak urinary stream? 1 (P)     How often have you had to push or strain to begin urination? 0 (P)     How many times did you most typically get up to urinate from the time you went to bed at night until the time you got up in the morning? 3 (P)     Quality of Life: If you were to spend the rest of your life with your urinary condition just the way it is now, how would you feel about that? 2 (P)     AUA SYMPTOM SCORE 8 (P)             LABS:  No results found for: "HGB", "HCT", "WBC", "PLT"]    Lab Results   Component Value Date    K 3.9 02/28/2022     (H) 02/28/2022    CO2 27 02/28/2022    BUN 23 02/28/2022    CREATININE 1.15 02/28/2022    CALCIUM 9.2 02/28/2022   ]        INPATIENT MEDS:    Current Outpatient Medications:   •  aspirin (ECOTRIN LOW STRENGTH) 81 mg EC tablet, Take 81 mg by mouth 2 (two) times a day, Disp: , Rfl:   •  atenolol (TENORMIN) 25 mg tablet, Take 1 tablet (25 mg total) by mouth daily, Disp: 90 tablet, Rfl: 3  •  Calcium Carbonate-Vit D-Min (CALCIUM 1200 PO), Take 1 capsule by mouth daily with breakfast, Disp: , Rfl:   •  cholecalciferol (VITAMIN D3) 1,000 units tablet, Take 1,000 Units by mouth 2 (two) times a day , Disp: , Rfl:   •  co-enzyme Q-10 30 MG capsule, Take 30 mg by mouth 3 (three) times a day, Disp: , Rfl:   •  CRANBERRY PO, Take by mouth daily To prevent gout, Disp: , Rfl:   •  diphenhydrAMINE (BENADRYL) 50 MG tablet, Take 50 mg by mouth daily at bedtime as needed for itching Sinus drainage/sleep aid, Disp: , Rfl:   •  glucosamine-chondroitin 500-400 MG tablet, Take 1 tablet by mouth 2 (two) times a day with meals , Disp: , Rfl:   •  Misc Natural Products (Tart Cherry Advanced) CAPS, Take 1 capsule by mouth daily with breakfast 1000 mg to prevent gout, Disp: , Rfl:   •  montelukast (SINGULAIR) 10 mg tablet, Take 1 tablet (10 mg total) by mouth daily at bedtime, Disp: 90 tablet, Rfl: 3  •  niacin 500 mg tablet, Take 500 mg by mouth 2 (two) times a day, Disp: , Rfl:   •  Omega-3 Fatty Acids (fish oil) 1,000 mg, Take 1,000 mg by mouth daily, Disp: , Rfl:   •  rosuvastatin (CRESTOR) 5 mg tablet, Take 1 tablet (5 mg total) by mouth daily at bedtime, Disp: 90 tablet, Rfl: 3  •  Zinc 50 MG CAPS, Take 1 capsule by mouth daily, Disp: , Rfl:       Physical Exam:   /70 (BP Location: Left arm, Patient Position: Sitting, Cuff Size: Standard)   Pulse 61   Ht 5' 9" (1.753 m)   Wt 80.7 kg (178 lb)   SpO2 96%   BMI 26.29 kg/m²   GEN: no acute distress    RESP: breathing comfortably with no accessory muscle use    ABD: soft, non-tender, non-distended   INCISION:    EXT: no significant peripheral edema     RADIOLOGY:   None    Assessment:   #1.   Prostate cancer    Plan:   -He will get a PSA next year  -He is cleared 14 years of undetectable PSA  -He may follow-up with his family doctor going forward and recommend continued annual PSA testing  -

## 2023-10-03 ENCOUNTER — APPOINTMENT (OUTPATIENT)
Dept: LAB | Facility: CLINIC | Age: 83
End: 2023-10-03
Payer: MEDICARE

## 2023-10-03 DIAGNOSIS — N18.31 STAGE 3A CHRONIC KIDNEY DISEASE (HCC): ICD-10-CM

## 2023-10-03 DIAGNOSIS — C61 PROSTATE CANCER (HCC): ICD-10-CM

## 2023-10-03 DIAGNOSIS — I10 ESSENTIAL HYPERTENSION: ICD-10-CM

## 2023-10-03 LAB
ANION GAP SERPL CALCULATED.3IONS-SCNC: 9 MMOL/L
BASOPHILS # BLD AUTO: 0.02 THOUSANDS/ÂΜL (ref 0–0.1)
BASOPHILS NFR BLD AUTO: 0 % (ref 0–1)
BUN SERPL-MCNC: 27 MG/DL (ref 5–25)
CALCIUM SERPL-MCNC: 8.9 MG/DL (ref 8.4–10.2)
CHLORIDE SERPL-SCNC: 103 MMOL/L (ref 96–108)
CO2 SERPL-SCNC: 27 MMOL/L (ref 21–32)
CREAT SERPL-MCNC: 1.15 MG/DL (ref 0.6–1.3)
EOSINOPHIL # BLD AUTO: 0.18 THOUSAND/ÂΜL (ref 0–0.61)
EOSINOPHIL NFR BLD AUTO: 2 % (ref 0–6)
ERYTHROCYTE [DISTWIDTH] IN BLOOD BY AUTOMATED COUNT: 12.1 % (ref 11.6–15.1)
GFR SERPL CREATININE-BSD FRML MDRD: 58 ML/MIN/1.73SQ M
GLUCOSE P FAST SERPL-MCNC: 106 MG/DL (ref 65–99)
HCT VFR BLD AUTO: 44.7 % (ref 36.5–49.3)
HGB BLD-MCNC: 14.8 G/DL (ref 12–17)
IMM GRANULOCYTES # BLD AUTO: 0.07 THOUSAND/UL (ref 0–0.2)
IMM GRANULOCYTES NFR BLD AUTO: 1 % (ref 0–2)
LYMPHOCYTES # BLD AUTO: 2.16 THOUSANDS/ÂΜL (ref 0.6–4.47)
LYMPHOCYTES NFR BLD AUTO: 28 % (ref 14–44)
MCH RBC QN AUTO: 32.1 PG (ref 26.8–34.3)
MCHC RBC AUTO-ENTMCNC: 33.1 G/DL (ref 31.4–37.4)
MCV RBC AUTO: 97 FL (ref 82–98)
MONOCYTES # BLD AUTO: 0.82 THOUSAND/ÂΜL (ref 0.17–1.22)
MONOCYTES NFR BLD AUTO: 10 % (ref 4–12)
NEUTROPHILS # BLD AUTO: 4.61 THOUSANDS/ÂΜL (ref 1.85–7.62)
NEUTS SEG NFR BLD AUTO: 59 % (ref 43–75)
NRBC BLD AUTO-RTO: 0 /100 WBCS
PLATELET # BLD AUTO: 237 THOUSANDS/UL (ref 149–390)
PMV BLD AUTO: 10.9 FL (ref 8.9–12.7)
POTASSIUM SERPL-SCNC: 4.2 MMOL/L (ref 3.5–5.3)
RBC # BLD AUTO: 4.61 MILLION/UL (ref 3.88–5.62)
SODIUM SERPL-SCNC: 139 MMOL/L (ref 135–147)
WBC # BLD AUTO: 7.86 THOUSAND/UL (ref 4.31–10.16)

## 2023-10-03 PROCEDURE — 85025 COMPLETE CBC W/AUTO DIFF WBC: CPT

## 2023-10-03 PROCEDURE — 36415 COLL VENOUS BLD VENIPUNCTURE: CPT

## 2023-10-03 PROCEDURE — 80048 BASIC METABOLIC PNL TOTAL CA: CPT

## 2023-10-13 ENCOUNTER — OFFICE VISIT (OUTPATIENT)
Dept: FAMILY MEDICINE CLINIC | Facility: CLINIC | Age: 83
End: 2023-10-13
Payer: MEDICARE

## 2023-10-13 VITALS
BODY MASS INDEX: 26.96 KG/M2 | SYSTOLIC BLOOD PRESSURE: 134 MMHG | OXYGEN SATURATION: 97 % | DIASTOLIC BLOOD PRESSURE: 64 MMHG | RESPIRATION RATE: 16 BRPM | WEIGHT: 182 LBS | HEIGHT: 69 IN | TEMPERATURE: 97.7 F | HEART RATE: 99 BPM

## 2023-10-13 DIAGNOSIS — N18.31 STAGE 3A CHRONIC KIDNEY DISEASE (HCC): Primary | ICD-10-CM

## 2023-10-13 DIAGNOSIS — I25.10 CORONARY ARTERY DISEASE INVOLVING NATIVE CORONARY ARTERY OF NATIVE HEART WITHOUT ANGINA PECTORIS: ICD-10-CM

## 2023-10-13 DIAGNOSIS — Z00.00 MEDICARE ANNUAL WELLNESS VISIT, SUBSEQUENT: ICD-10-CM

## 2023-10-13 DIAGNOSIS — E78.2 MIXED HYPERLIPIDEMIA: ICD-10-CM

## 2023-10-13 DIAGNOSIS — I10 ESSENTIAL HYPERTENSION: ICD-10-CM

## 2023-10-13 DIAGNOSIS — R73.01 IMPAIRED FASTING GLUCOSE: ICD-10-CM

## 2023-10-13 PROCEDURE — G0439 PPPS, SUBSEQ VISIT: HCPCS | Performed by: FAMILY MEDICINE

## 2023-10-13 PROCEDURE — G0442 ANNUAL ALCOHOL SCREEN 15 MIN: HCPCS | Performed by: FAMILY MEDICINE

## 2023-10-13 PROCEDURE — G0444 DEPRESSION SCREEN ANNUAL: HCPCS | Performed by: FAMILY MEDICINE

## 2023-10-13 PROCEDURE — 99214 OFFICE O/P EST MOD 30 MIN: CPT | Performed by: FAMILY MEDICINE

## 2023-10-13 RX ORDER — ROSUVASTATIN CALCIUM 5 MG/1
5 TABLET, COATED ORAL
Qty: 90 TABLET | Refills: 3 | Status: SHIPPED | OUTPATIENT
Start: 2023-10-13

## 2023-10-13 RX ORDER — ATENOLOL 25 MG/1
25 TABLET ORAL DAILY
Qty: 90 TABLET | Refills: 3 | Status: SHIPPED | OUTPATIENT
Start: 2023-10-13

## 2023-10-13 NOTE — PATIENT INSTRUCTIONS
Medicare Preventive Visit Patient Instructions  Thank you for completing your Welcome to Medicare Visit or Medicare Annual Wellness Visit today. Your next wellness visit will be due in one year (10/13/2024). The screening/preventive services that you may require over the next 5-10 years are detailed below. Some tests may not apply to you based off risk factors and/or age. Screening tests ordered at today's visit but not completed yet may show as past due. Also, please note that scanned in results may not display below. Preventive Screenings:  Service Recommendations Previous Testing/Comments   Colorectal Cancer Screening  Colonoscopy    Fecal Occult Blood Test (FOBT)/Fecal Immunochemical Test (FIT)  Fecal DNA/Cologuard Test  Flexible Sigmoidoscopy Age: 43-73 years old   Colonoscopy: every 10 years (May be performed more frequently if at higher risk)  OR  FOBT/FIT: every 1 year  OR  Cologuard: every 3 years  OR  Sigmoidoscopy: every 5 years  Screening may be recommended earlier than age 39 if at higher risk for colorectal cancer. Also, an individualized decision between you and your healthcare provider will decide whether screening between the ages of 77-80 would be appropriate.  Colonoscopy: Not on file  FOBT/FIT: Not on file  Cologuard: Not on file  Sigmoidoscopy: Not on file          Prostate Cancer Screening Individualized decision between patient and health care provider in men between ages of 53-66   Medicare will cover every 12 months beginning on the day after your 50th birthday PSA: <0.01 ng/mL     History Prostate Cancer  Screening Not Indicated     Hepatitis C Screening Once for adults born between 1945 and 1965  More frequently in patients at high risk for Hepatitis C Hep C Antibody: Not on file        Diabetes Screening 1-2 times per year if you're at risk for diabetes or have pre-diabetes Fasting glucose: 106 mg/dL (10/3/2023)  A1C: 5.6 % (11/18/2020)  Screening Current   Cholesterol Screening Once every 5 years if you don't have a lipid disorder. May order more often based on risk factors. Lipid panel: 09/05/2023  Screening Not Indicated  History Lipid Disorder      Other Preventive Screenings Covered by Medicare:  Abdominal Aortic Aneurysm (AAA) Screening: covered once if your at risk. You're considered to be at risk if you have a family history of AAA or a male between the age of 70-76 who smoking at least 100 cigarettes in your lifetime. Lung Cancer Screening: covers low dose CT scan once per year if you meet all of the following conditions: (1) Age 48-67; (2) No signs or symptoms of lung cancer; (3) Current smoker or have quit smoking within the last 15 years; (4) You have a tobacco smoking history of at least 20 pack years (packs per day x number of years you smoked); (5) You get a written order from a healthcare provider. Glaucoma Screening: covered annually if you're considered high risk: (1) You have diabetes OR (2) Family history of glaucoma OR (3)  aged 48 and older OR (3)  American aged 72 and older  Osteoporosis Screening: covered every 2 years if you meet one of the following conditions: (1) Have a vertebral abnormality; (2) On glucocorticoid therapy for more than 3 months; (3) Have primary hyperparathyroidism; (4) On osteoporosis medications and need to assess response to drug therapy. HIV Screening: covered annually if you're between the age of 14-79. Also covered annually if you are younger than 13 and older than 72 with risk factors for HIV infection. For pregnant patients, it is covered up to 3 times per pregnancy.     Immunizations:  Immunization Recommendations   Influenza Vaccine Annual influenza vaccination during flu season is recommended for all persons aged >= 6 months who do not have contraindications   Pneumococcal Vaccine   * Pneumococcal conjugate vaccine = PCV13 (Prevnar 13), PCV15 (Vaxneuvance), PCV20 (Prevnar 20)  * Pneumococcal polysaccharide vaccine = PPSV23 (Pneumovax) Adults 31-29 yo with certain risk factors or if 69+ yo  If never received any pneumonia vaccine: recommend Prevnar 20 (PCV20)  Give PCV20 if previously received 1 dose of PCV13 or PPSV23   Hepatitis B Vaccine 3 dose series if at intermediate or high risk (ex: diabetes, end stage renal disease, liver disease)   Respiratory syncytial virus (RSV) Vaccine - COVERED BY MEDICARE PART D  * RSVPreF3 (Arexvy) CDC recommends that adults 61years of age and older may receive a single dose of RSV vaccine using shared clinical decision-making (SCDM)   Tetanus (Td) Vaccine - COST NOT COVERED BY MEDICARE PART B Following completion of primary series, a booster dose should be given every 10 years to maintain immunity against tetanus. Td may also be given as tetanus wound prophylaxis. Tdap Vaccine - COST NOT COVERED BY MEDICARE PART B Recommended at least once for all adults. For pregnant patients, recommended with each pregnancy. Shingles Vaccine (Shingrix) - COST NOT COVERED BY MEDICARE PART B  2 shot series recommended in those 19 years and older who have or will have weakened immune systems or those 50 years and older     Health Maintenance Due:  There are no preventive care reminders to display for this patient. Immunizations Due:      Topic Date Due   • COVID-19 Vaccine (6 - Moderna series) 01/30/2023     Advance Directives   What are advance directives? Advance directives are legal documents that state your wishes and plans for medical care. These plans are made ahead of time in case you lose your ability to make decisions for yourself. Advance directives can apply to any medical decision, such as the treatments you want, and if you want to donate organs. What are the types of advance directives? There are many types of advance directives, and each state has rules about how to use them. You may choose a combination of any of the following:  Living will:   This is a written record of the treatment you want. You can also choose which treatments you do not want, which to limit, and which to stop at a certain time. This includes surgery, medicine, IV fluid, and tube feedings. Durable power of  for healthcare [facility]): This is a written record that states who you want to make healthcare choices for you when you are unable to make them for yourself. This person, called a proxy, is usually a family member or a friend. You may choose more than 1 proxy. Do not resuscitate (DNR) order:  A DNR order is used in case your heart stops beating or you stop breathing. It is a request not to have certain forms of treatment, such as CPR. A DNR order may be included in other types of advance directives. Medical directive: This covers the care that you want if you are in a coma, near death, or unable to make decisions for yourself. You can list the treatments you want for each condition. Treatment may include pain medicine, surgery, blood transfusions, dialysis, IV or tube feedings, and a ventilator (breathing machine). Values history: This document has questions about your views, beliefs, and how you feel and think about life. This information can help others choose the care that you would choose. Why are advance directives important? An advance directive helps you control your care. Although spoken wishes may be used, it is better to have your wishes written down. Spoken wishes can be misunderstood, or not followed. Treatments may be given even if you do not want them. An advance directive may make it easier for your family to make difficult choices about your care. Fall Prevention    Fall prevention  includes ways to make your home and other areas safer. It also includes ways you can move more carefully to prevent a fall. Health conditions that cause changes in your blood pressure, vision, or muscle strength and coordination may increase your risk for falls.  Medicines may also increase your risk for falls if they make you dizzy, weak, or sleepy. Fall prevention tips:   Stand or sit up slowly. Use assistive devices as directed. Wear shoes that fit well and have soles that . Wear a personal alarm. Stay active. Manage your medical conditions. Home Safety Tips:  Add items to prevent falls in the bathroom. Keep paths clear. Install bright lights in your home. Keep items you use often on shelves within reach. Paint or place reflective tape on the edges of your stairs. Weight Management   Why it is important to manage your weight:  Being overweight increases your risk of health conditions such as heart disease, high blood pressure, type 2 diabetes, and certain types of cancer. It can also increase your risk for osteoarthritis, sleep apnea, and other respiratory problems. Aim for a slow, steady weight loss. Even a small amount of weight loss can lower your risk of health problems. How to lose weight safely:  A safe and healthy way to lose weight is to eat fewer calories and get regular exercise. You can lose up about 1 pound a week by decreasing the number of calories you eat by 500 calories each day. Healthy meal plan for weight management:  A healthy meal plan includes a variety of foods, contains fewer calories, and helps you stay healthy. A healthy meal plan includes the following:  Eat whole-grain foods more often. A healthy meal plan should contain fiber. Fiber is the part of grains, fruits, and vegetables that is not broken down by your body. Whole-grain foods are healthy and provide extra fiber in your diet. Some examples of whole-grain foods are whole-wheat breads and pastas, oatmeal, brown rice, and bulgur. Eat a variety of vegetables every day. Include dark, leafy greens such as spinach, kale, maurizio greens, and mustard greens. Eat yellow and orange vegetables such as carrots, sweet potatoes, and winter squash. Eat a variety of fruits every day.   Choose fresh or canned fruit (canned in its own juice or light syrup) instead of juice. Fruit juice has very little or no fiber. Eat low-fat dairy foods. Drink fat-free (skim) milk or 1% milk. Eat fat-free yogurt and low-fat cottage cheese. Try low-fat cheeses such as mozzarella and other reduced-fat cheeses. Choose meat and other protein foods that are low in fat. Choose beans or other legumes such as split peas or lentils. Choose fish, skinless poultry (chicken or turkey), or lean cuts of red meat (beef or pork). Before you cook meat or poultry, cut off any visible fat. Use less fat and oil. Try baking foods instead of frying them. Add less fat, such as margarine, sour cream, regular salad dressing and mayonnaise to foods. Eat fewer high-fat foods. Some examples of high-fat foods include french fries, doughnuts, ice cream, and cakes. Eat fewer sweets. Limit foods and drinks that are high in sugar. This includes candy, cookies, regular soda, and sweetened drinks. Exercise:  Exercise at least 30 minutes per day on most days of the week. Some examples of exercise include walking, biking, dancing, and swimming. You can also fit in more physical activity by taking the stairs instead of the elevator or parking farther away from stores. Ask your healthcare provider about the best exercise plan for you. Alcohol Use and Your Health    Drinking too much can harm your health. Excessive alcohol use leads to about 88,000 death in the [facility] each year, and shortens the life of those who diet by almost 30 years. Further, excessive drinking cost the economy $249 billion in 2010. Most excessive drinkers are not alcohol dependent. Excessive alcohol use has immediate effects that increase the risk of many harmful health conditions. These are most often the result of binge drinking. Over time, excessive alcohol use can lead to the development of chronic diseases and other series health problems.     What is considered a "drink"? Excessive alcohol use includes:  Binge Drinking: For women, 4 or more drinks consumed on one occasion. For men, 5 or more drinks consumed on one occasion. Heavy Drinking: For women, 8 or more drinks per week. For men, 15 or more drinks per week  Any alcohol used by pregnant women  Any alcohol used by those under the age of 21 years    If you choose to drink, do so in moderation:  Do not drink at all if you are under the age of 24, or if you are or may be pregnant, or have health problems that could be made worse by drinking. For women, up to 1 drink per day  For men, up to 2 drinks a day    No one should begin drinking or drink more frequently based on potential health benefits    Short-Term Health Risks:  Injuries: motor vehicle crashes, falls, drownings, burns  Violence: homicide, suicide, sexual assault, intimate partner violence  Alcohol poisoning  Reproductive health: risky sexual behaviors, unintended prengnacy, sexually transmitted diseases, miscarriage, stillbirth, fetal alcohol syndrome    Long-Term Health Risks:  Chronic diseases: high blood pressure, heart disease, stroke, liver disease, digestive problems  Cancers: breast, mouth and throat, liver, colon  Learning and memory problems: dementia, poor school performance  Mental health: depression, anxiety, insomnia  Social problems: lost productivity, family problems, unemployment  Alcohol dependence    For support and more information:  Substance Abuse and [address]  Web Address: https://Tripping/    Alcoholics Anonymous        Web Address: http://www.carmen.info/    https://www.cdc.gov/alcohol/fact-sheets/alcohol-use.htm     © Copyright [employer] 2018 Information is for End User's use only and may not be sold, redistributed or otherwise used for commercial purposes.  All illustrations and images included in CareNotes® are the copyrighted property of [employer] or [address] [facility] [place]

## 2023-10-13 NOTE — PROGRESS NOTES
Assessment and Plan:     Problem List Items Addressed This Visit        Endocrine    Impaired fasting glucose    Relevant Orders    HEMOGLOBIN A1C W/ EAG ESTIMATION       Cardiovascular and Mediastinum    Essential hypertension    Relevant Medications    atenolol (TENORMIN) 25 mg tablet    Other Relevant Orders    Ambulatory Referral to Cardiology    Coronary artery disease involving native coronary artery of native heart without angina pectoris    Relevant Medications    atenolol (TENORMIN) 25 mg tablet    rosuvastatin (CRESTOR) 5 mg tablet    Other Relevant Orders    Ambulatory Referral to Cardiology       Genitourinary    Stage 3a chronic kidney disease ([address]) - Primary    Relevant Orders    CBC    Basic metabolic panel    Albumin / creatinine urine ratio       Other    Mixed hyperlipidemia    Relevant Medications    rosuvastatin (CRESTOR) 5 mg tablet    Other Relevant Orders    Lipid panel   Other Visit Diagnoses     Medicare annual wellness visit, subsequent            Above  Refills provided today  Referral made to new cardiologist.  History of stent 2002. Remains on atenolol and baby aspirin. Concerns today over stage III CKD. GFR is currently stable. We will obtain albumin creatinine ratio prior to his 6-month visit. If there is any proteinuria we can adjust his blood pressure medication. Avoid nephrotoxic medications and limit red meat intake. Follow-up in 6 months    BMI Counseling: Body mass index is 26.88 kg/m². The BMI is above normal. Nutrition recommendations include decreasing portion sizes, consuming healthier snacks, reducing intake of saturated and trans fat and reducing intake of cholesterol. Exercise recommendations include moderate physical activity 150 minutes/week. No pharmacotherapy was ordered. Patient referred to PCP. Rationale for BMI follow-up plan is due to patient being overweight or obese.      Depression Screening and Follow-up Plan: Patient was screened for depression during today's encounter. They screened negative with a PHQ-2 score of 0. Preventive health issues were discussed with patient, and age appropriate screening tests were ordered as noted in patient's After Visit Summary. Personalized health advice and appropriate referrals for health education or preventive services given if needed, as noted in patient's After Visit Summary. History of Present Illness:     Patient presents for a Medicare Wellness Visit    HPI   Patient Care Team:  Shashi Guzman MD as PCP - General (Family Medicine)  Brandon Hwang MD     Review of Systems:     Review of Systems   Constitutional:  Negative for fatigue and fever. HENT:  Negative for sore throat. Eyes:  Negative for visual disturbance. Respiratory:  Negative for cough, chest tightness and shortness of breath. Cardiovascular:  Negative for chest pain, palpitations and leg swelling. Gastrointestinal:  Negative for abdominal pain, constipation, diarrhea and nausea. Endocrine: Negative for cold intolerance and heat intolerance. Genitourinary:  Negative for flank pain. Musculoskeletal:  Negative for back pain and neck pain. Skin:  Negative for rash. Neurological:  Negative for headaches. Psychiatric/Behavioral:  Negative for behavioral problems and confusion.          Problem List:     Patient Active Problem List   Diagnosis   • Prostate cancer [facility])   • Essential hypertension   • Mixed hyperlipidemia   • Coronary artery disease involving native coronary artery of native heart without angina pectoris   • Sciatic pain, left   • Postnasal drip   • Impaired fasting glucose   • Stage 3a chronic kidney disease (HCC)   • Seasonal allergic rhinitis due to pollen      Past Medical and Surgical History:     Past Medical History:   Diagnosis Date   • Allergic 1970   • Cancer ([address] [facility]) 4/2009   • Coronary artery disease    • Erectile dysfunction    • Heart disease    • Prostate cancer [facility])      Past Surgical History:   Procedure Laterality Date   • CAROTID STENT     • HERNIA REPAIR     • PROSTATECTOMY        Family History:     Family History   Problem Relation Age of Onset   • Prostate cancer Father       Social History:     Social History     Socioeconomic History   • Marital status: /Civil Union     Spouse name: None   • Number of children: None   • Years of education: None   • Highest education level: None   Occupational History   • None   Tobacco Use   • Smoking status: Never   • Smokeless tobacco: Never   Vaping Use   • Vaping Use: Never used   Substance and Sexual Activity   • Alcohol use: Yes     Alcohol/week: 4.0 standard drinks of alcohol     Types: 4 Glasses of wine per week     Comment: 1 glass of red wine per day on advice of cardiologist   • Drug use: Never   • Sexual activity: Not Currently     Partners: Female   Other Topics Concern   • None   Social History Narrative   • None     Social Determinants of Health     Financial Resource Strain: Low Risk  (10/7/2023)    Overall Financial Resource Strain (CARDIA)    • Difficulty of Paying Living Expenses: Not hard at all   Food Insecurity: Not on file   Transportation Needs: No Transportation Needs (10/7/2023)    PRAPARE - Transportation    • Lack of Transportation (Medical): No    • Lack of Transportation (Non-Medical):  No   Physical Activity: Not on file   Stress: Not on file   Social Connections: Not on file   Intimate Partner Violence: Not on file   Housing Stability: Not on file      Medications and Allergies:     Current Outpatient Medications   Medication Sig Dispense Refill   • aspirin (ECOTRIN LOW STRENGTH) 81 mg EC tablet Take 81 mg by mouth 2 (two) times a day     • atenolol (TENORMIN) 25 mg tablet Take 1 tablet (25 mg total) by mouth daily 90 tablet 3   • Calcium Carbonate-Vit D-Min (CALCIUM 1200 PO) Take 1 capsule by mouth daily with breakfast     • cholecalciferol (VITAMIN D3) 1,000 units tablet Take 1,000 Units by mouth 2 (two) times a day      • co-enzyme Q-10 30 MG capsule Take 30 mg by mouth 3 (three) times a day     • CRANBERRY PO Take by mouth daily To prevent gout     • diphenhydrAMINE (BENADRYL) 50 MG tablet Take 50 mg by mouth daily at bedtime as needed for itching Sinus drainage/sleep aid     • glucosamine-chondroitin 500-400 MG tablet Take 1 tablet by mouth 2 (two) times a day with meals      • Misc Natural Products (Tart Cherry Advanced) CAPS Take 1 capsule by mouth daily with breakfast 1000 mg to prevent gout     • niacin 500 mg tablet Take 500 mg by mouth 2 (two) times a day     • Omega-3 Fatty Acids (fish oil) 1,000 mg Take 1,000 mg by mouth daily     • rosuvastatin (CRESTOR) 5 mg tablet Take 1 tablet (5 mg total) by mouth daily at bedtime 90 tablet 3   • Zinc 50 MG CAPS Take 1 capsule by mouth daily     • montelukast (SINGULAIR) 10 mg tablet Take 1 tablet (10 mg total) by mouth daily at bedtime (Patient not taking: Reported on 10/13/2023) 90 tablet 3     No current facility-administered medications for this visit. Allergies   Allergen Reactions   • Grass Extracts [Gramineae Pollens] Allergic Rhinitis      Immunizations:     Immunization History   Administered Date(s) Administered   • COVID-19 MODERNA VACC 0.5 ML IM 01/14/2021, 02/11/2021, 11/01/2021, 04/27/2022   • COVID-19 Pfizer Vac BIVALENT Rohan-sucrose 12 Yr+ IM 09/30/2022   • INFLUENZA 10/15/2017, 10/30/2017, 10/08/2018   • Influenza Split High Dose Preservative Free IM 10/08/2018, 10/02/2019   • Influenza, high dose seasonal 0.7 mL 10/13/2020, 09/10/2021   • Influenza, seasonal, injectable 10/07/2010   • Influenza, seasonal, injectable, preservative free 10/19/2015   • Pneumococcal Conjugate 13-Valent 01/26/2015   • Pneumococcal Polysaccharide PPV23 12/16/2019   • Tdap 12/20/2017   • Zoster 10/07/2010      Health Maintenance: There are no preventive care reminders to display for this patient.       Topic Date Due   • COVID-19 Vaccine (6 - Moderna series) 01/30/2023 Medicare Screening Tests and Risk Assessments:     Mariposa Leiws is here for his Subsequent Wellness visit. Last Medicare Wellness visit information reviewed, patient interviewed, no change since last AWV. Health Risk Assessment:   Patient rates overall health as very good. Patient feels that their physical health rating is same. Patient is very satisfied with their life. Eyesight was rated as slightly worse. Hearing was rated as slightly worse. Patient feels that their emotional and mental health rating is same. Patients states they are never, rarely angry. Patient states they are never, rarely unusually tired/fatigued. Pain experienced in the last 7 days has been none. Patient states that he has experienced no weight loss or gain in last 6 months. Depression Screening:   PHQ-2 Score: 0      Fall Risk Screening: In the past year, patient has experienced: history of falling in past year      Home Safety:  Patient does not have trouble with stairs inside or outside of their home. Patient has working smoke alarms and has working carbon monoxide detector. Home safety hazards include: none. Nutrition:   Current diet is Low Carb, No Added Salt and Limited junk food. Medications:   Patient is currently taking over-the-counter supplements. OTC medications include: see medication list. Patient is able to manage medications. Activities of Daily Living (ADLs)/Instrumental Activities of Daily Living (IADLs):   Walk and transfer into and out of bed and chair?: Yes  Dress and groom yourself?: Yes    Bathe or shower yourself?: Yes    Feed yourself? Yes  Do your laundry/housekeeping?: Yes  Manage your money, pay your bills and track your expenses?: Yes  Make your own meals?: Yes    Do your own shopping?: Yes    Previous Hospitalizations:   Any hospitalizations or ED visits within the last 12 months?: No      Advance Care Planning:   Living will: Yes    Durable POA for healthcare: Yes    Advanced directive:  Yes PREVENTIVE SCREENINGS      Cardiovascular Screening:    General: Screening Not Indicated and History Lipid Disorder      Diabetes Screening:     General: Screening Current      Prostate Cancer Screening:    General: History Prostate Cancer and Screening Not Indicated      Abdominal Aortic Aneurysm (AAA) Screening:        General: Screening Not Indicated      Lung Cancer Screening:     General: Screening Not Indicated      Hepatitis C Screening:    General: Screening Not Indicated    Screening, Brief Intervention, and Referral to Treatment (SBIRT)    Screening  Typical number of drinks in a day: 1  Typical number of drinks in a week: 6  Interpretation: Low risk drinking behavior. AUDIT-C Screenin) How often did you have a drink containing alcohol in the past year? 4 or more times a week  2) How many drinks did you have on a typical day when you were drinking in the past year? 1 to 2  3) How often did you have 6 or more drinks on one occasion in the past year? never    AUDIT-C Score: 4  Interpretation: Score 4-12 (male): POSITIVE screen for alcohol misuse    AUDIT Screenin) How often during the last year have you found that you were not able to stop drinking once you had started? 0 - never  5) How often during the last year have you failed to do what was normally expected from you because of drinking? 0 - never  6) How often during the last year have you needed a first drink in the morning to get yourself going after a heavy drinking session?  0 - never  7) How often during the last year have you had a feeling of guilt or remorse after drinking? 0 - never  8) How often during the last year have you been unable to remember what happened the night before because you had been drinking? 0 - never  9) Have you or someone else been injured as a result of your drinking? 0 - no  10) Has a relative or friend or a doctor or another health worker been concerned about your drinking or suggested you cut down? 0 - no    AUDIT Score: 4  Interpretation: Low risk alcohol consumption    Single Item Drug Screening:  How often have you used an illegal drug (including marijuana) or a prescription medication for non-medical reasons in the past year? never    Single Item Drug Screen Score: 0  Interpretation: Negative screen for possible drug use disorder    Brief Intervention  Alcohol & drug use screenings were reviewed. No concerns regarding substance use disorder identified. Healthy alcohol use/limits discussed. Time Spent  Time spent screening/evaluating the patient for alcohol misuse: 5 minutes. Annual Depression Screening  Time spent screening and evaluating the patient for depression during today's encounter was 5 minutes. Other Counseling Topics:   Car/seat belt/driving safety, skin self-exam, sunscreen and regular weightbearing exercise and calcium and vitamin D intake. No results found. Physical Exam:     /64 (BP Location: Left arm, Patient Position: Sitting, Cuff Size: Large)   Pulse 99   Temp 97.7 °F (36.5 °C)   Resp 16   Ht 5' 9" (1.753 m)   Wt 82.6 kg (182 lb)   SpO2 97%   BMI 26.88 kg/m²     Physical Exam  Vitals and nursing note reviewed. Constitutional:       Appearance: Normal appearance. He is well-developed. HENT:      Head: Normocephalic and atraumatic. Eyes:      Extraocular Movements: Extraocular movements intact. Pupils: Pupils are equal, round, and reactive to light. Cardiovascular:      Rate and Rhythm: Normal rate and regular rhythm. Pulmonary:      Effort: Pulmonary effort is normal.      Breath sounds: Normal breath sounds. Abdominal:      General: Bowel sounds are normal.      Palpations: Abdomen is soft. Musculoskeletal:      Cervical back: Normal range of motion. Skin:     General: Skin is warm and dry. Neurological:      General: No focal deficit present. Mental Status: He is alert and oriented to person, place, and time.    Psychiatric: Mood and Affect: Mood normal.         Speech: Speech normal.         Behavior: Behavior normal.          Francie Cotton MD

## 2024-01-19 ENCOUNTER — OFFICE VISIT (OUTPATIENT)
Dept: CARDIOLOGY CLINIC | Facility: CLINIC | Age: 84
End: 2024-01-19
Payer: MEDICARE

## 2024-01-19 VITALS
WEIGHT: 181.8 LBS | BODY MASS INDEX: 26.93 KG/M2 | OXYGEN SATURATION: 97 % | SYSTOLIC BLOOD PRESSURE: 134 MMHG | DIASTOLIC BLOOD PRESSURE: 78 MMHG | HEART RATE: 69 BPM | HEIGHT: 69 IN

## 2024-01-19 DIAGNOSIS — I25.10 CORONARY ARTERY DISEASE INVOLVING NATIVE CORONARY ARTERY OF NATIVE HEART WITHOUT ANGINA PECTORIS: ICD-10-CM

## 2024-01-19 DIAGNOSIS — N18.31 STAGE 3A CHRONIC KIDNEY DISEASE (HCC): ICD-10-CM

## 2024-01-19 DIAGNOSIS — E78.2 MIXED HYPERLIPIDEMIA: Primary | ICD-10-CM

## 2024-01-19 DIAGNOSIS — I10 ESSENTIAL HYPERTENSION: ICD-10-CM

## 2024-01-19 PROCEDURE — 93000 ELECTROCARDIOGRAM COMPLETE: CPT | Performed by: INTERNAL MEDICINE

## 2024-01-19 PROCEDURE — 99204 OFFICE O/P NEW MOD 45 MIN: CPT | Performed by: INTERNAL MEDICINE

## 2024-01-19 RX ORDER — LEVOCETIRIZINE DIHYDROCHLORIDE 5 MG/1
5 TABLET, FILM COATED ORAL EVERY EVENING
COMMUNITY

## 2024-01-19 NOTE — PROGRESS NOTES
[facility] Cardiology  Office Consultation  Sajan Day 83 y.o. male MRN: [id number]        Problems    1. Mixed hyperlipidemia        2. Coronary artery disease involving native coronary artery of native heart without angina pectoris  Ambulatory Referral to Cardiology    POCT ECG    Basic metabolic panel    Lipid panel    CBC      3. Essential hypertension  Ambulatory Referral to Cardiology      4. Stage 3a chronic kidney disease (HCC)            Impression:      Coronary Artery Disease  Mid LAD stent 2002 for exertional angina  No current symptoms, active lifestyle, he is on appropriate medical therapy, atenolol, aspirin, low-dose statin, niacin.  Hypercholesterolemia  Primarily low HDL  On niacin, HDL 39  LDL 49  On rosuvastatin 5mg.    Plan    No current cardiovascular testing is recommended per guidelines  We discussed the fact that niacin has no clear benefit clinically from a cardiovascular standpoint, but it clearly has raised his HDL from 26-39  He tolerates it well and can continue taking it  We discussed the need for low-dose rosuvastatin 5 mg, he does seem to tolerate this fairly well, his LDL is well suppressed at 49, and his non-HDL is well suppressed  He does not have hypertension, he has been on atenolol empirically since his stent.      Reason for Consult / Principal Problem: Set up local cardiologist    HPI: Sajan Day is a 83 y.o. year old male who comes to see me as a new patient, taking care of for the last 20 years by Dr. Luisa youssef in [place], having had stable angina and 2002, workup revealing a mid LAD lesion of about 90%, status post stenting, was on Plavix/dual antiplatelets for a number of years and eventually discontinued.  He was on a full aspirin for a while, and then eventually dropped to 162 mg which is more appropriate at this time.  He has not had any significant bleeding events.  He continues to be active, routine exercise, spent much of his life and doing outdoor biking, has  no current anginal symptoms.  Last stress test was a stress echo in 2021, it was normal.  His EKG is completely normal.  He has been getting annual carotid imaging for mild plaque, we discussed that this is not current guideline recommendation.  He does not wish to pursue nuclear imaging from a cardiac perspective, his last nuclear stress test was in 2009.  He wishes to avoid the radiation dose.  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  He is well aware of the issues with radiation exposure.  He had prostate cancer, he is status post prostatectomy in 2009.        Review of Systems   Constitutional: Negative.    HENT: Negative.     Eyes: Negative.    Respiratory: Negative.     Cardiovascular: Negative.    Gastrointestinal: Negative.    Endocrine: Negative.    Genitourinary: Negative.    Musculoskeletal: Negative.    Skin: Negative.    Neurological: Negative.    Hematological: Negative.    Psychiatric/Behavioral: Negative.           Past Medical History:   Diagnosis Date   • Allergic 1970   • Cancer (HCC) 4/2009   • Coronary artery disease    • Erectile dysfunction    • Heart disease    • Prostate cancer (HCC)      Past Surgical History:   Procedure Laterality Date   • CAROTID STENT     • HERNIA REPAIR     • PROSTATECTOMY       Social History     Substance and Sexual Activity   Alcohol Use Yes   • Alcohol/week: 4.0 standard drinks of alcohol   • Types: 4 Glasses of wine per week    Comment: 1 glass of red wine per day on advice of cardiologist     Social History     Substance and Sexual Activity   Drug Use Never     Social History     Tobacco Use   Smoking Status Never   Smokeless Tobacco Never     Family History   Problem Relation Age of Onset   • Prostate cancer Father        Allergies:  Allergies   Allergen Reactions   • Grass Extracts [Gramineae Pollens] Allergic Rhinitis       Medications:     Current Outpatient Medications:   •  aspirin (ECOTRIN LOW  STRENGTH) 81 mg EC tablet, Take 81 mg by mouth 2 (two) times a day, Disp: , Rfl:   •  atenolol (TENORMIN) 25 mg tablet, Take 1 tablet (25 mg total) by mouth daily, Disp: 90 tablet, Rfl: 3  •  Calcium Carbonate-Vit D-Min (CALCIUM 1200 PO), Take 1 capsule by mouth daily with breakfast, Disp: , Rfl:   •  cholecalciferol (VITAMIN D3) 1,000 units tablet, Take 1,000 Units by mouth 2 (two) times a day , Disp: , Rfl:   •  co-enzyme Q-10 30 MG capsule, Take 30 mg by mouth 3 (three) times a day, Disp: , Rfl:   •  CRANBERRY PO, Take by mouth daily To prevent gout, Disp: , Rfl:   •  diphenhydrAMINE (BENADRYL) 50 MG tablet, Take 50 mg by mouth daily at bedtime as needed for itching Sinus drainage/sleep aid, Disp: , Rfl:   •  glucosamine-chondroitin 500-400 MG tablet, Take 1 tablet by mouth 2 (two) times a day with meals , Disp: , Rfl:   •  levocetirizine (XYZAL) 5 MG tablet, Take 5 mg by mouth every evening, Disp: , Rfl:   •  Misc Natural Products (Tart Cherry Advanced) CAPS, Take 1 capsule by mouth daily with breakfast 1000 mg to prevent gout, Disp: , Rfl:   •  niacin 500 mg tablet, Take 500 mg by mouth 2 (two) times a day, Disp: , Rfl:   •  Omega-3 Fatty Acids (fish oil) 1,000 mg, Take 1,000 mg by mouth daily, Disp: , Rfl:   •  rosuvastatin (CRESTOR) 5 mg tablet, Take 1 tablet (5 mg total) by mouth daily at bedtime, Disp: 90 tablet, Rfl: 3  •  Zinc 50 MG CAPS, Take 1 capsule by mouth daily, Disp: , Rfl:   •  montelukast (SINGULAIR) 10 mg tablet, Take 1 tablet (10 mg total) by mouth daily at bedtime (Patient not taking: Reported on 1/19/2024), Disp: 90 tablet, Rfl: 3      Vitals:    01/19/24 0955   BP: 134/78   Pulse: 69   SpO2: 97%     Weight (last 2 days)     Date/Time Weight    01/19/24 0955 82.5 (181.8)        Physical Exam  Constitutional:       General: He is not in acute distress.     Appearance: Normal appearance. He is well-developed. He is not diaphoretic.   HENT:      Head: Normocephalic and atraumatic.   Eyes:       "General: No scleral icterus.     Conjunctiva/sclera: Conjunctivae normal.      Pupils: Pupils are equal, round, and reactive to light.   Neck:      Thyroid: No thyromegaly.      Vascular: No carotid bruit or JVD.   Cardiovascular:      Rate and Rhythm: Normal rate and regular rhythm.      Heart sounds: Normal heart sounds. No murmur heard.     No friction rub. No gallop.   Pulmonary:      Effort: Pulmonary effort is normal. No respiratory distress.      Breath sounds: Normal breath sounds. No wheezing or rales.   Abdominal:      General: Bowel sounds are normal. There is no distension.      Palpations: Abdomen is soft.      Tenderness: There is no abdominal tenderness.   Musculoskeletal:         General: No deformity. Normal range of motion.      Cervical back: Normal range of motion and neck supple.      Right lower leg: No edema.      Left lower leg: No edema.   Skin:     General: Skin is warm and dry.      Findings: No erythema or rash.   Neurological:      General: No focal deficit present.      Mental Status: He is alert and oriented to person, place, and time.      Cranial Nerves: No cranial nerve deficit.      Motor: No weakness.           Laboratory Studies:    Laboratory studies personally reviewed    Cardiac testing:     EKG reviewed personally:   Results for orders placed or performed in visit on 01/19/24   POCT ECG    Impression    Normal sinus rhythm, normal EKG       Echocardiogram:      Stress test:      Holter:      Cardiac catheterization:        Romeo Galindo MD    Portions of the record may have been created with voice recognition software.  Occasional wrong word or \"sound a like\" substitutions may have occurred due to the inherent limitations of voice recognition software.  Read the chart carefully and recognize, using context, where substitutions have occurred.  "

## 2024-01-25 DIAGNOSIS — I25.10 CORONARY ARTERY DISEASE INVOLVING NATIVE CORONARY ARTERY OF NATIVE HEART WITHOUT ANGINA PECTORIS: Primary | ICD-10-CM

## 2024-01-25 RX ORDER — NITROGLYCERIN 0.4 MG/1
0.4 TABLET SUBLINGUAL
Qty: 30 TABLET | Refills: 1 | Status: SHIPPED | OUTPATIENT
Start: 2024-01-25

## 2024-04-09 ENCOUNTER — APPOINTMENT (OUTPATIENT)
Dept: LAB | Facility: CLINIC | Age: 84
End: 2024-04-09
Payer: MEDICARE

## 2024-04-09 DIAGNOSIS — E78.2 MIXED HYPERLIPIDEMIA: ICD-10-CM

## 2024-04-09 DIAGNOSIS — R73.01 IMPAIRED FASTING GLUCOSE: ICD-10-CM

## 2024-04-09 DIAGNOSIS — N18.31 STAGE 3A CHRONIC KIDNEY DISEASE (HCC): ICD-10-CM

## 2024-04-09 LAB
ANION GAP SERPL CALCULATED.3IONS-SCNC: 7 MMOL/L (ref 4–13)
BUN SERPL-MCNC: 25 MG/DL (ref 5–25)
CALCIUM SERPL-MCNC: 8.8 MG/DL (ref 8.4–10.2)
CHLORIDE SERPL-SCNC: 108 MMOL/L (ref 96–108)
CHOLEST SERPL-MCNC: 103 MG/DL
CO2 SERPL-SCNC: 26 MMOL/L (ref 21–32)
CREAT SERPL-MCNC: 1.07 MG/DL (ref 0.6–1.3)
CREAT UR-MCNC: 130 MG/DL
ERYTHROCYTE [DISTWIDTH] IN BLOOD BY AUTOMATED COUNT: 12.2 % (ref 11.6–15.1)
EST. AVERAGE GLUCOSE BLD GHB EST-MCNC: 120 MG/DL
GFR SERPL CREATININE-BSD FRML MDRD: 63 ML/MIN/1.73SQ M
GLUCOSE P FAST SERPL-MCNC: 101 MG/DL (ref 65–99)
HBA1C MFR BLD: 5.8 %
HCT VFR BLD AUTO: 44.5 % (ref 36.5–49.3)
HDLC SERPL-MCNC: 33 MG/DL
HGB BLD-MCNC: 14.9 G/DL (ref 12–17)
LDLC SERPL CALC-MCNC: 47 MG/DL (ref 0–100)
MCH RBC QN AUTO: 31.8 PG (ref 26.8–34.3)
MCHC RBC AUTO-ENTMCNC: 33.5 G/DL (ref 31.4–37.4)
MCV RBC AUTO: 95 FL (ref 82–98)
MICROALBUMIN UR-MCNC: 8.3 MG/L
MICROALBUMIN/CREAT 24H UR: 6 MG/G CREATININE (ref 0–30)
NONHDLC SERPL-MCNC: 70 MG/DL
PLATELET # BLD AUTO: 249 THOUSANDS/UL (ref 149–390)
PMV BLD AUTO: 10.5 FL (ref 8.9–12.7)
POTASSIUM SERPL-SCNC: 4.2 MMOL/L (ref 3.5–5.3)
RBC # BLD AUTO: 4.68 MILLION/UL (ref 3.88–5.62)
SODIUM SERPL-SCNC: 141 MMOL/L (ref 135–147)
TRIGL SERPL-MCNC: 114 MG/DL
WBC # BLD AUTO: 7.39 THOUSAND/UL (ref 4.31–10.16)

## 2024-04-09 PROCEDURE — 36415 COLL VENOUS BLD VENIPUNCTURE: CPT

## 2024-04-09 PROCEDURE — 82570 ASSAY OF URINE CREATININE: CPT

## 2024-04-09 PROCEDURE — 80061 LIPID PANEL: CPT

## 2024-04-09 PROCEDURE — 80048 BASIC METABOLIC PNL TOTAL CA: CPT

## 2024-04-09 PROCEDURE — 85027 COMPLETE CBC AUTOMATED: CPT

## 2024-04-09 PROCEDURE — 82043 UR ALBUMIN QUANTITATIVE: CPT

## 2024-04-09 PROCEDURE — 83036 HEMOGLOBIN GLYCOSYLATED A1C: CPT

## 2024-04-24 ENCOUNTER — OFFICE VISIT (OUTPATIENT)
Dept: FAMILY MEDICINE CLINIC | Facility: CLINIC | Age: 84
End: 2024-04-24
Payer: MEDICARE

## 2024-04-24 VITALS
HEART RATE: 63 BPM | RESPIRATION RATE: 16 BRPM | OXYGEN SATURATION: 96 % | SYSTOLIC BLOOD PRESSURE: 132 MMHG | HEIGHT: 69 IN | TEMPERATURE: 97.3 F | DIASTOLIC BLOOD PRESSURE: 78 MMHG | WEIGHT: 181.5 LBS | BODY MASS INDEX: 26.88 KG/M2

## 2024-04-24 DIAGNOSIS — J30.1 SEASONAL ALLERGIC RHINITIS DUE TO POLLEN: Primary | ICD-10-CM

## 2024-04-24 DIAGNOSIS — R11.0 NAUSEA: ICD-10-CM

## 2024-04-24 DIAGNOSIS — I10 ESSENTIAL HYPERTENSION: ICD-10-CM

## 2024-04-24 DIAGNOSIS — N18.31 STAGE 3A CHRONIC KIDNEY DISEASE (HCC): ICD-10-CM

## 2024-04-24 DIAGNOSIS — C61 PROSTATE CANCER (HCC): ICD-10-CM

## 2024-04-24 DIAGNOSIS — Z86.010 PERSONAL HISTORY OF COLONIC POLYPS: ICD-10-CM

## 2024-04-24 DIAGNOSIS — R73.01 IMPAIRED FASTING GLUCOSE: ICD-10-CM

## 2024-04-24 DIAGNOSIS — E78.2 MIXED HYPERLIPIDEMIA: ICD-10-CM

## 2024-04-24 PROBLEM — Z86.0100 PERSONAL HISTORY OF COLONIC POLYPS: Status: ACTIVE | Noted: 2024-04-24

## 2024-04-24 PROBLEM — R09.82 POSTNASAL DRIP: Status: RESOLVED | Noted: 2021-03-10 | Resolved: 2024-04-24

## 2024-04-24 PROCEDURE — G2211 COMPLEX E/M VISIT ADD ON: HCPCS | Performed by: FAMILY MEDICINE

## 2024-04-24 PROCEDURE — 99214 OFFICE O/P EST MOD 30 MIN: CPT | Performed by: FAMILY MEDICINE

## 2024-04-24 NOTE — ASSESSMENT & PLAN NOTE
BP Readings from Last 3 Encounters:   04/24/24 132/78   01/19/24 134/78   10/13/23 134/64     Controlled, continue atenolol 25 mg daily

## 2024-04-24 NOTE — PROGRESS NOTES
Name: Sajan Day      : 1940      MRN: [id number]  Encounter Provider: Sheila Martinez MD  Encounter Date: 2024   Encounter department: [facility]    Assessment & Plan     1. Seasonal allergic rhinitis due to pollen  Assessment & Plan:  Continue singular 10 mg daily along with Claritin.  Recommend using Flonase for rhinitis.          2. Prostate cancer (HCC)  Assessment & Plan:  Continue following with urology PSA stable      3. Stage 3a chronic kidney disease (HCC)  Assessment & Plan:  Lab Results   Component Value Date    EGFR 63 2024    EGFR 58 10/03/2023    EGFR 70 2023    CREATININE 1.07 2024    CREATININE 1.15 10/03/2023    CREATININE 1.06 2023   Overall stable.  Continue to avoid nephrotoxic medications monitor blood pressure closely.  Monitor sodium intake      4. Impaired fasting glucose  Assessment & Plan:  Lab Results   Component Value Date    HGBA1C 5.8 (H) 2024      Stable.  Continue to monitor.  Repeat labs      5. Essential hypertension  Assessment & Plan:  BP Readings from Last 3 Encounters:   24 132/78   24 134/78   10/13/23 134/64     Controlled, continue atenolol 25 mg daily      6. Mixed hyperlipidemia  Assessment & Plan:  Currently stable.  Continue current medications.       7. Nausea  Assessment & Plan:  Unclear etiology.  Denies any black or bloody stools.  Unlikely to be cardiovascular based on his symptoms.  Does have some acid reflux but this is situational.  This has been a chronic problem.  Will continue to monitor.  Lab work within normal limits.      8. Personal history of colonic polyps     Follow-up for annual wellness visit    Subjective      Patient presents today for 6-month follow-up.  He does report a few episodes of feeling ill if he bends over too often.  He will feel stomach pain and some sweating.  He is able to do yard work and play pickle ball without any problems.  Denies any chest pain or  shortness of breath.  Denies any change in vision.  He recently completed blood work which she would like to review today.  He does have seasonal allergies and notes increase in runny nose.  He does take allergy medication as needed but is concerned about taking this medication every day.          Review of Systems   Constitutional:  Negative for activity change, fatigue and fever.   HENT:  Positive for rhinorrhea.    Eyes:  Negative for visual disturbance.   Respiratory:  Negative for shortness of breath.    Cardiovascular:  Negative for chest pain.   Gastrointestinal:  Positive for nausea. Negative for abdominal pain, constipation and diarrhea.   Endocrine: Negative for cold intolerance and heat intolerance.   Musculoskeletal:  Negative for back pain.   Skin:  Negative for rash.   Allergic/Immunologic: Positive for environmental allergies.   Neurological:  Negative for headaches.   Psychiatric/Behavioral:  Negative for confusion.        Current Outpatient Medications on File Prior to Visit   Medication Sig   • aspirin (ECOTRIN LOW STRENGTH) 81 mg EC tablet Take 81 mg by mouth 2 (two) times a day   • atenolol (TENORMIN) 25 mg tablet Take 1 tablet (25 mg total) by mouth daily   • Calcium Carbonate-Vit D-Min (CALCIUM 1200 PO) Take 1 capsule by mouth daily with breakfast   • cholecalciferol (VITAMIN D3) 1,000 units tablet Take 1,000 Units by mouth 2 (two) times a day    • co-enzyme Q-10 30 MG capsule Take 30 mg by mouth 3 (three) times a day   • CRANBERRY PO Take by mouth daily To prevent gout   • diphenhydrAMINE (BENADRYL) 50 MG tablet Take 50 mg by mouth daily at bedtime as needed for itching Sinus drainage/sleep aid   • glucosamine-chondroitin 500-400 MG tablet Take 1 tablet by mouth 2 (two) times a day with meals    • levocetirizine (XYZAL) 5 MG tablet Take 5 mg by mouth every evening   • Misc Natural Products (Tart Cherry Advanced) CAPS Take 1 capsule by mouth daily with breakfast 1000 mg to prevent gout   •  "niacin 500 mg tablet Take 500 mg by mouth 2 (two) times a day   • nitroglycerin (NITROSTAT) 0.4 mg SL tablet Place 1 tablet (0.4 mg total) under the tongue every 5 (five) minutes as needed for chest pain   • Omega-3 Fatty Acids (fish oil) 1,000 mg Take 1,000 mg by mouth daily   • rosuvastatin (CRESTOR) 5 mg tablet Take 1 tablet (5 mg total) by mouth daily at bedtime   • Zinc 50 MG CAPS Take 1 capsule by mouth daily   • montelukast (SINGULAIR) 10 mg tablet Take 1 tablet (10 mg total) by mouth daily at bedtime (Patient not taking: Reported on 4/24/2024)       Objective     /78 (BP Location: Left arm, Patient Position: Sitting, Cuff Size: Large)   Pulse 63   Temp (!) 97.3 °F (36.3 °C) (Temporal)   Resp 16   Ht 5' 9\" (1.753 m)   Wt 82.3 kg (181 lb 8 oz)   SpO2 96%   BMI 26.80 kg/m²     Physical Exam  Vitals and nursing note reviewed.   Constitutional:       Appearance: He is well-developed.   HENT:      Head: Normocephalic and atraumatic.   Cardiovascular:      Rate and Rhythm: Normal rate and regular rhythm.   Pulmonary:      Effort: Pulmonary effort is normal.      Breath sounds: Normal breath sounds.   Neurological:      General: No focal deficit present.      Mental Status: He is alert.   Psychiatric:         Mood and Affect: Mood normal.         Behavior: Behavior normal.       Sheila Martinez MD    "

## 2024-04-24 NOTE — ASSESSMENT & PLAN NOTE
Lab Results   Component Value Date    EGFR 63 04/09/2024    EGFR 58 10/03/2023    EGFR 70 03/02/2023    CREATININE 1.07 04/09/2024    CREATININE 1.15 10/03/2023    CREATININE 1.06 03/02/2023   Overall stable.  Continue to avoid nephrotoxic medications monitor blood pressure closely.  Monitor sodium intake

## 2024-04-24 NOTE — ASSESSMENT & PLAN NOTE
Lab Results   Component Value Date    HGBA1C 5.8 (H) 04/09/2024      Stable.  Continue to monitor.  Repeat labs

## 2024-04-24 NOTE — ASSESSMENT & PLAN NOTE
Unclear etiology.  Denies any black or bloody stools.  Unlikely to be cardiovascular based on his symptoms.  Does have some acid reflux but this is situational.  This has been a chronic problem.  Will continue to monitor.  Lab work within normal limits.

## 2024-05-28 ENCOUNTER — OFFICE VISIT (OUTPATIENT)
Dept: FAMILY MEDICINE CLINIC | Facility: CLINIC | Age: 84
End: 2024-05-28
Payer: MEDICARE

## 2024-05-28 VITALS
WEIGHT: 179 LBS | HEART RATE: 63 BPM | DIASTOLIC BLOOD PRESSURE: 72 MMHG | RESPIRATION RATE: 16 BRPM | SYSTOLIC BLOOD PRESSURE: 134 MMHG | TEMPERATURE: 98.1 F | HEIGHT: 69 IN | OXYGEN SATURATION: 97 % | BODY MASS INDEX: 26.51 KG/M2

## 2024-05-28 DIAGNOSIS — M25.561 ACUTE PAIN OF RIGHT KNEE: Primary | ICD-10-CM

## 2024-05-28 PROCEDURE — G2211 COMPLEX E/M VISIT ADD ON: HCPCS | Performed by: FAMILY MEDICINE

## 2024-05-28 PROCEDURE — 99213 OFFICE O/P EST LOW 20 MIN: CPT | Performed by: FAMILY MEDICINE

## 2024-05-28 RX ORDER — KETOCONAZOLE 20 MG/ML
SHAMPOO TOPICAL
COMMUNITY
Start: 2024-05-09

## 2024-05-28 RX ORDER — CLOBETASOL PROPIONATE 0.46 MG/ML
SOLUTION TOPICAL
COMMUNITY
Start: 2024-05-09

## 2024-05-28 NOTE — PROGRESS NOTES
Ambulatory Visit  Name: Sajan Day      : 1940      MRN: [id number]  Encounter Provider: Jeovany Tolentino MD  Encounter Date: 2024   Encounter department: [facility]    Assessment & Plan   1. Acute pain of right knee  Assessment & Plan:  Suspected R knee bursitis/ patellar tendonitis. Trial of NSAIDs for 1 week. Advised to call or return if no improvement.          History of Present Illness     3-week history of localized pain right anterior knee only symptomatic with direct palpation. No pain with walking,standing/weightbearing. No giving way or locking, No swelling.  No nighttime pain. No history of trauma or injury.  Physically active-bike riding.  Current medications reviewed      Review of Systems   Constitutional:  Negative for appetite change, chills, fever and unexpected weight change.   Musculoskeletal:  Positive for arthralgias. Negative for gait problem and joint swelling.   Skin:  Negative for rash.     Past Medical History:   Diagnosis Date    Allergic 1970    Cancer (HCC) 2009    Coronary artery disease     Erectile dysfunction     Heart disease     Postnasal drip 03/10/2021    Prostate cancer (HCC)      Past Surgical History:   Procedure Laterality Date    CAROTID STENT      HERNIA REPAIR      PROSTATECTOMY       Family History   Problem Relation Age of Onset    Prostate cancer Father      Social History     Tobacco Use    Smoking status: Never    Smokeless tobacco: Never   Vaping Use    Vaping status: Never Used   Substance and Sexual Activity    Alcohol use: Yes     Alcohol/week: 4.0 standard drinks of alcohol     Types: 4 Glasses of wine per week     Comment: 1 glass of red wine per day on advice of cardiologist    Drug use: Never    Sexual activity: Not Currently     Partners: Female     Current Outpatient Medications on File Prior to Visit   Medication Sig    aspirin (ECOTRIN LOW STRENGTH) 81 mg EC tablet Take 81 mg by mouth 2 (two) times a day    atenolol  (TENORMIN) 25 mg tablet Take 1 tablet (25 mg total) by mouth daily    Calcium Carbonate-Vit D-Min (CALCIUM 1200 PO) Take 1 capsule by mouth daily with breakfast    cholecalciferol (VITAMIN D3) 1,000 units tablet Take 1,000 Units by mouth 2 (two) times a day     clobetasol (TEMOVATE) 0.05 % external solution     co-enzyme Q-10 30 MG capsule Take 30 mg by mouth 3 (three) times a day    CRANBERRY PO Take by mouth daily To prevent gout    diphenhydrAMINE (BENADRYL) 50 MG tablet Take 50 mg by mouth daily at bedtime as needed for itching Sinus drainage/sleep aid    glucosamine-chondroitin 500-400 MG tablet Take 1 tablet by mouth 2 (two) times a day with meals     ketoconazole (NIZORAL) 2 % shampoo     levocetirizine (XYZAL) 5 MG tablet Take 5 mg by mouth every evening    Misc Natural Products (Tart Cherry Advanced) CAPS Take 1 capsule by mouth daily with breakfast 1000 mg to prevent gout    niacin 500 mg tablet Take 500 mg by mouth 2 (two) times a day    nitroglycerin (NITROSTAT) 0.4 mg SL tablet Place 1 tablet (0.4 mg total) under the tongue every 5 (five) minutes as needed for chest pain    Omega-3 Fatty Acids (fish oil) 1,000 mg Take 1,000 mg by mouth daily    rosuvastatin (CRESTOR) 5 mg tablet Take 1 tablet (5 mg total) by mouth daily at bedtime    Zinc 50 MG CAPS Take 1 capsule by mouth daily    montelukast (SINGULAIR) 10 mg tablet Take 1 tablet (10 mg total) by mouth daily at bedtime (Patient not taking: Reported on 4/24/2024)     Allergies   Allergen Reactions    Grass Extracts [Gramineae Pollens] Allergic Rhinitis     Immunization History   Administered Date(s) Administered    COVID-19 MODERNA VACC 0.5 ML IM 01/14/2021, 02/11/2021, 11/01/2021, 04/27/2022    COVID-19 Pfizer Vac BIVALENT Rohan-sucrose 12 Yr+ IM 09/30/2022    INFLUENZA 10/15/2017, 10/30/2017, 10/08/2018    Influenza Split High Dose Preservative Free IM 10/08/2018, 10/02/2019    Influenza, high dose seasonal 0.7 mL 10/13/2020, 09/10/2021    Influenza,  "seasonal, injectable 10/07/2010    Influenza, seasonal, injectable, preservative free 10/19/2015    Pneumococcal Conjugate 13-Valent 01/26/2015    Pneumococcal Polysaccharide PPV23 12/16/2019    Tdap 12/20/2017    Zoster 10/07/2010     Objective     /72 (BP Location: Left arm, Patient Position: Sitting, Cuff Size: Large)   Pulse 63   Temp 98.1 °F (36.7 °C) (Temporal)   Resp 16   Ht 5' 9\" (1.753 m)   Wt 81.2 kg (179 lb)   SpO2 97%   BMI 26.43 kg/m²     Physical Exam  Constitutional:       General: He is not in acute distress.  Musculoskeletal:      Right knee: Swelling present. No deformity, effusion, erythema, ecchymosis, bony tenderness or crepitus. Normal range of motion. Tenderness present over the patellar tendon. No medial joint line, lateral joint line, MCL or LCL tenderness. No LCL laxity, MCL laxity, ACL laxity or PCL laxity. Normal meniscus and normal patellar mobility. Normal pulse.      Instability Tests: Anterior drawer test negative. Posterior drawer test negative. Medial Emil test negative and lateral Emil test negative.      Comments: Minimal swelling R anterior knee distal to R patella. + tenderness on palpation    Neurological:      Mental Status: He is alert.       Administrative Statements         "

## 2024-05-29 PROBLEM — M25.561 ACUTE PAIN OF RIGHT KNEE: Status: ACTIVE | Noted: 2024-05-29

## 2024-05-29 NOTE — ASSESSMENT & PLAN NOTE
Suspected R knee bursitis/ patellar tendonitis. Trial of NSAIDs for 1 week. Advised to call or return if no improvement.

## 2024-07-19 ENCOUNTER — TELEPHONE (OUTPATIENT)
Dept: FAMILY MEDICINE CLINIC | Facility: CLINIC | Age: 84
End: 2024-07-19

## 2024-07-19 NOTE — TELEPHONE ENCOUNTER
Called patient spouse no response left message letting her know form for patient is completed and she is able to  for patient.  Form will be place in the front, in filling cabinet.

## 2024-09-26 DIAGNOSIS — I10 ESSENTIAL HYPERTENSION: ICD-10-CM

## 2024-09-26 DIAGNOSIS — N18.31 STAGE 3A CHRONIC KIDNEY DISEASE (HCC): ICD-10-CM

## 2024-09-26 DIAGNOSIS — R73.01 IMPAIRED FASTING GLUCOSE: ICD-10-CM

## 2024-09-26 DIAGNOSIS — C61 PROSTATE CANCER (HCC): Primary | ICD-10-CM

## 2024-09-26 DIAGNOSIS — E78.2 MIXED HYPERLIPIDEMIA: ICD-10-CM

## 2024-10-22 ENCOUNTER — APPOINTMENT (OUTPATIENT)
Dept: LAB | Facility: CLINIC | Age: 84
End: 2024-10-22
Payer: MEDICARE

## 2024-10-22 DIAGNOSIS — C61 PROSTATE CANCER (HCC): ICD-10-CM

## 2024-10-22 DIAGNOSIS — I10 ESSENTIAL HYPERTENSION: ICD-10-CM

## 2024-10-22 DIAGNOSIS — E78.2 MIXED HYPERLIPIDEMIA: ICD-10-CM

## 2024-10-22 DIAGNOSIS — R73.01 IMPAIRED FASTING GLUCOSE: ICD-10-CM

## 2024-10-22 LAB
ERYTHROCYTE [DISTWIDTH] IN BLOOD BY AUTOMATED COUNT: 12.6 % (ref 11.6–15.1)
EST. AVERAGE GLUCOSE BLD GHB EST-MCNC: 120 MG/DL
HBA1C MFR BLD: 5.8 %
HCT VFR BLD AUTO: 45.6 % (ref 36.5–49.3)
HGB BLD-MCNC: 15.1 G/DL (ref 12–17)
MCH RBC QN AUTO: 32.3 PG (ref 26.8–34.3)
MCHC RBC AUTO-ENTMCNC: 33.1 G/DL (ref 31.4–37.4)
MCV RBC AUTO: 98 FL (ref 82–98)
PLATELET # BLD AUTO: 227 THOUSANDS/UL (ref 149–390)
PMV BLD AUTO: 11 FL (ref 8.9–12.7)
PSA SERPL-MCNC: <0.008 NG/ML (ref 0–4)
RBC # BLD AUTO: 4.67 MILLION/UL (ref 3.88–5.62)
WBC # BLD AUTO: 7.56 THOUSAND/UL (ref 4.31–10.16)

## 2024-10-22 PROCEDURE — 36415 COLL VENOUS BLD VENIPUNCTURE: CPT

## 2024-10-22 PROCEDURE — 80048 BASIC METABOLIC PNL TOTAL CA: CPT

## 2024-10-22 PROCEDURE — 85027 COMPLETE CBC AUTOMATED: CPT

## 2024-10-22 PROCEDURE — 83036 HEMOGLOBIN GLYCOSYLATED A1C: CPT

## 2024-10-22 PROCEDURE — 80061 LIPID PANEL: CPT

## 2024-10-23 LAB
ANION GAP SERPL CALCULATED.3IONS-SCNC: 7 MMOL/L (ref 4–13)
BUN SERPL-MCNC: 25 MG/DL (ref 5–25)
CALCIUM SERPL-MCNC: 9.1 MG/DL (ref 8.4–10.2)
CHLORIDE SERPL-SCNC: 107 MMOL/L (ref 96–108)
CHOLEST SERPL-MCNC: 108 MG/DL
CO2 SERPL-SCNC: 29 MMOL/L (ref 21–32)
CREAT SERPL-MCNC: 1.08 MG/DL (ref 0.6–1.3)
GFR SERPL CREATININE-BSD FRML MDRD: 63 ML/MIN/1.73SQ M
GLUCOSE P FAST SERPL-MCNC: 88 MG/DL (ref 65–99)
HDLC SERPL-MCNC: 41 MG/DL
LDLC SERPL CALC-MCNC: 50 MG/DL (ref 0–100)
NONHDLC SERPL-MCNC: 67 MG/DL
POTASSIUM SERPL-SCNC: 4.2 MMOL/L (ref 3.5–5.3)
SODIUM SERPL-SCNC: 143 MMOL/L (ref 135–147)
TRIGL SERPL-MCNC: 86 MG/DL

## 2024-10-24 ENCOUNTER — RA CDI HCC (OUTPATIENT)
Dept: OTHER | Facility: HOSPITAL | Age: 84
End: 2024-10-24

## 2024-10-30 DIAGNOSIS — I10 ESSENTIAL HYPERTENSION: ICD-10-CM

## 2024-10-30 DIAGNOSIS — I25.10 CORONARY ARTERY DISEASE INVOLVING NATIVE CORONARY ARTERY OF NATIVE HEART WITHOUT ANGINA PECTORIS: ICD-10-CM

## 2024-10-30 DIAGNOSIS — E78.2 MIXED HYPERLIPIDEMIA: ICD-10-CM

## 2024-10-31 ENCOUNTER — OFFICE VISIT (OUTPATIENT)
Dept: FAMILY MEDICINE CLINIC | Facility: CLINIC | Age: 84
End: 2024-10-31
Payer: MEDICARE

## 2024-10-31 VITALS
HEIGHT: 69 IN | WEIGHT: 178 LBS | TEMPERATURE: 97.6 F | SYSTOLIC BLOOD PRESSURE: 132 MMHG | OXYGEN SATURATION: 98 % | DIASTOLIC BLOOD PRESSURE: 74 MMHG | BODY MASS INDEX: 26.36 KG/M2 | RESPIRATION RATE: 17 BRPM | HEART RATE: 63 BPM

## 2024-10-31 DIAGNOSIS — C61 PROSTATE CANCER (HCC): ICD-10-CM

## 2024-10-31 DIAGNOSIS — Z23 ENCOUNTER FOR IMMUNIZATION: ICD-10-CM

## 2024-10-31 DIAGNOSIS — Z00.00 MEDICARE ANNUAL WELLNESS VISIT, SUBSEQUENT: ICD-10-CM

## 2024-10-31 DIAGNOSIS — J30.9 ALLERGIC RHINITIS, UNSPECIFIED SEASONALITY, UNSPECIFIED TRIGGER: ICD-10-CM

## 2024-10-31 DIAGNOSIS — N18.31 STAGE 3A CHRONIC KIDNEY DISEASE (HCC): ICD-10-CM

## 2024-10-31 DIAGNOSIS — I10 ESSENTIAL HYPERTENSION: Primary | ICD-10-CM

## 2024-10-31 PROBLEM — H93.13 TINNITUS OF BOTH EARS: Status: ACTIVE | Noted: 2024-10-31

## 2024-10-31 PROCEDURE — 99214 OFFICE O/P EST MOD 30 MIN: CPT | Performed by: FAMILY MEDICINE

## 2024-10-31 PROCEDURE — G0008 ADMIN INFLUENZA VIRUS VAC: HCPCS | Performed by: FAMILY MEDICINE

## 2024-10-31 PROCEDURE — G0438 PPPS, INITIAL VISIT: HCPCS | Performed by: FAMILY MEDICINE

## 2024-10-31 PROCEDURE — 90662 IIV NO PRSV INCREASED AG IM: CPT | Performed by: FAMILY MEDICINE

## 2024-10-31 RX ORDER — AZELASTINE 1 MG/ML
1 SPRAY, METERED NASAL 2 TIMES DAILY
Qty: 30 ML | Refills: 0 | Status: SHIPPED | OUTPATIENT
Start: 2024-10-31

## 2024-10-31 RX ORDER — ROSUVASTATIN CALCIUM 5 MG/1
5 TABLET, COATED ORAL
Qty: 90 TABLET | Refills: 1 | Status: SHIPPED | OUTPATIENT
Start: 2024-10-31

## 2024-10-31 RX ORDER — ATENOLOL 25 MG/1
25 TABLET ORAL DAILY
Qty: 90 TABLET | Refills: 1 | Status: SHIPPED | OUTPATIENT
Start: 2024-10-31

## 2024-10-31 NOTE — ASSESSMENT & PLAN NOTE
BP Readings from Last 3 Encounters:   10/31/24 132/74   05/28/24 134/72   04/24/24 132/78     Controlled, continue atenolol 25 mg daily

## 2024-10-31 NOTE — ASSESSMENT & PLAN NOTE
Lab Results   Component Value Date    EGFR 63 10/22/2024    EGFR 63 04/09/2024    EGFR 58 10/03/2023    CREATININE 1.08 10/22/2024    CREATININE 1.07 04/09/2024    CREATININE 1.15 10/03/2023     Kidney function stable.  Avoid nephrotoxic medications

## 2024-10-31 NOTE — PATIENT INSTRUCTIONS
Medicare Preventive Visit Patient Instructions  Thank you for completing your Welcome to Medicare Visit or Medicare Annual Wellness Visit today. Your next wellness visit will be due in one year (11/1/2025).  The screening/preventive services that you may require over the next 5-10 years are detailed below. Some tests may not apply to you based off risk factors and/or age. Screening tests ordered at today's visit but not completed yet may show as past due. Also, please note that scanned in results may not display below.  Preventive Screenings:  Service Recommendations Previous Testing/Comments   Colorectal Cancer Screening  Colonoscopy    Fecal Occult Blood Test (FOBT)/Fecal Immunochemical Test (FIT)  Fecal DNA/Cologuard Test  Flexible Sigmoidoscopy Age: 45-75 years old   Colonoscopy: every 10 years (May be performed more frequently if at higher risk)  OR  FOBT/FIT: every 1 year  OR  Cologuard: every 3 years  OR  Sigmoidoscopy: every 5 years  Screening may be recommended earlier than age 45 if at higher risk for colorectal cancer. Also, an individualized decision between you and your healthcare provider will decide whether screening between the ages of 76-85 would be appropriate. Colonoscopy: Not on file  FOBT/FIT: Not on file  Cologuard: Not on file  Sigmoidoscopy: Not on file          Prostate Cancer Screening Individualized decision between patient and health care provider in men between ages of 55-69   Medicare will cover every 12 months beginning on the day after your 50th birthday PSA: <0.008 ng/mL     History Prostate Cancer  Screening Not Indicated     Hepatitis C Screening Once for adults born between 1945 and 1965  More frequently in patients at high risk for Hepatitis C Hep C Antibody: Not on file        Diabetes Screening 1-2 times per year if you're at risk for diabetes or have pre-diabetes Fasting glucose: 88 mg/dL (10/22/2024)  A1C: 5.8 % (10/22/2024)  Screening Current   Cholesterol Screening Once  every 5 years if you don't have a lipid disorder. May order more often based on risk factors. Lipid panel: 10/22/2024  Screening Not Indicated  History Lipid Disorder      Other Preventive Screenings Covered by Medicare:  Abdominal Aortic Aneurysm (AAA) Screening: covered once if your at risk. You're considered to be at risk if you have a family history of AAA or a male between the age of 65-75 who smoking at least 100 cigarettes in your lifetime.  Lung Cancer Screening: covers low dose CT scan once per year if you meet all of the following conditions: (1) Age 55-77; (2) No signs or symptoms of lung cancer; (3) Current smoker or have quit smoking within the last 15 years; (4) You have a tobacco smoking history of at least 20 pack years (packs per day x number of years you smoked); (5) You get a written order from a healthcare provider.  Glaucoma Screening: covered annually if you're considered high risk: (1) You have diabetes OR (2) Family history of glaucoma OR (3)  aged 50 and older OR (4)  American aged 65 and older  Osteoporosis Screening: covered every 2 years if you meet one of the following conditions: (1) Have a vertebral abnormality; (2) On glucocorticoid therapy for more than 3 months; (3) Have primary hyperparathyroidism; (4) On osteoporosis medications and need to assess response to drug therapy.  HIV Screening: covered annually if you're between the age of 15-65. Also covered annually if you are younger than 15 and older than 65 with risk factors for HIV infection. For pregnant patients, it is covered up to 3 times per pregnancy.    Immunizations:  Immunization Recommendations   Influenza Vaccine Annual influenza vaccination during flu season is recommended for all persons aged >= 6 months who do not have contraindications   Pneumococcal Vaccine   * Pneumococcal conjugate vaccine = PCV13 (Prevnar 13), PCV15 (Vaxneuvance), PCV20 (Prevnar 20)  * Pneumococcal polysaccharide vaccine  = PPSV23 (Pneumovax) Adults 19-63 yo with certain risk factors or if 65+ yo  If never received any pneumonia vaccine: recommend Prevnar 20 (PCV20)  Give PCV20 if previously received 1 dose of PCV13 or PPSV23   Hepatitis B Vaccine 3 dose series if at intermediate or high risk (ex: diabetes, end stage renal disease, liver disease)   Respiratory syncytial virus (RSV) Vaccine - COVERED BY MEDICARE PART D  * RSVPreF3 (Arexvy) CDC recommends that adults 60 years of age and older may receive a single dose of RSV vaccine using shared clinical decision-making (SCDM)   Tetanus (Td) Vaccine - COST NOT COVERED BY MEDICARE PART B Following completion of primary series, a booster dose should be given every 10 years to maintain immunity against tetanus. Td may also be given as tetanus wound prophylaxis.   Tdap Vaccine - COST NOT COVERED BY MEDICARE PART B Recommended at least once for all adults. For pregnant patients, recommended with each pregnancy.   Shingles Vaccine (Shingrix) - COST NOT COVERED BY MEDICARE PART B  2 shot series recommended in those 19 years and older who have or will have weakened immune systems or those 50 years and older     Health Maintenance Due:  There are no preventive care reminders to display for this patient.  Immunizations Due:      Topic Date Due   • Influenza Vaccine (1) 09/01/2024     Advance Directives   What are advance directives?  Advance directives are legal documents that state your wishes and plans for medical care. These plans are made ahead of time in case you lose your ability to make decisions for yourself. Advance directives can apply to any medical decision, such as the treatments you want, and if you want to donate organs.   What are the types of advance directives?  There are many types of advance directives, and each state has rules about how to use them. You may choose a combination of any of the following:  Living will:  This is a written record of the treatment you want. You  can also choose which treatments you do not want, which to limit, and which to stop at a certain time. This includes surgery, medicine, IV fluid, and tube feedings.   Durable power of  for healthcare (DPAHC):  This is a written record that states who you want to make healthcare choices for you when you are unable to make them for yourself. This person, called a proxy, is usually a family member or a friend. You may choose more than 1 proxy.  Do not resuscitate (DNR) order:  A DNR order is used in case your heart stops beating or you stop breathing. It is a request not to have certain forms of treatment, such as CPR. A DNR order may be included in other types of advance directives.  Medical directive:  This covers the care that you want if you are in a coma, near death, or unable to make decisions for yourself. You can list the treatments you want for each condition. Treatment may include pain medicine, surgery, blood transfusions, dialysis, IV or tube feedings, and a ventilator (breathing machine).  Values history:  This document has questions about your views, beliefs, and how you feel and think about life. This information can help others choose the care that you would choose.  Why are advance directives important?  An advance directive helps you control your care. Although spoken wishes may be used, it is better to have your wishes written down. Spoken wishes can be misunderstood, or not followed. Treatments may be given even if you do not want them. An advance directive may make it easier for your family to make difficult choices about your care.   Weight Management   Why it is important to manage your weight:  Being overweight increases your risk of health conditions such as heart disease, high blood pressure, type 2 diabetes, and certain types of cancer. It can also increase your risk for osteoarthritis, sleep apnea, and other respiratory problems. Aim for a slow, steady weight loss. Even a small  amount of weight loss can lower your risk of health problems.  How to lose weight safely:  A safe and healthy way to lose weight is to eat fewer calories and get regular exercise. You can lose up about 1 pound a week by decreasing the number of calories you eat by 500 calories each day.   Healthy meal plan for weight management:  A healthy meal plan includes a variety of foods, contains fewer calories, and helps you stay healthy. A healthy meal plan includes the following:  Eat whole-grain foods more often.  A healthy meal plan should contain fiber. Fiber is the part of grains, fruits, and vegetables that is not broken down by your body. Whole-grain foods are healthy and provide extra fiber in your diet. Some examples of whole-grain foods are whole-wheat breads and pastas, oatmeal, brown rice, and bulgur.  Eat a variety of vegetables every day.  Include dark, leafy greens such as spinach, kale, maurizio greens, and mustard greens. Eat yellow and orange vegetables such as carrots, sweet potatoes, and winter squash.   Eat a variety of fruits every day.  Choose fresh or canned fruit (canned in its own juice or light syrup) instead of juice. Fruit juice has very little or no fiber.  Eat low-fat dairy foods.  Drink fat-free (skim) milk or 1% milk. Eat fat-free yogurt and low-fat cottage cheese. Try low-fat cheeses such as mozzarella and other reduced-fat cheeses.  Choose meat and other protein foods that are low in fat.  Choose beans or other legumes such as split peas or lentils. Choose fish, skinless poultry (chicken or turkey), or lean cuts of red meat (beef or pork). Before you cook meat or poultry, cut off any visible fat.   Use less fat and oil.  Try baking foods instead of frying them. Add less fat, such as margarine, sour cream, regular salad dressing and mayonnaise to foods. Eat fewer high-fat foods. Some examples of high-fat foods include french fries, doughnuts, ice cream, and cakes.  Eat fewer sweets.  Limit  "foods and drinks that are high in sugar. This includes candy, cookies, regular soda, and sweetened drinks.  Exercise:  Exercise at least 30 minutes per day on most days of the week. Some examples of exercise include walking, biking, dancing, and swimming. You can also fit in more physical activity by taking the stairs instead of the elevator or parking farther away from stores. Ask your healthcare provider about the best exercise plan for you.   Alcohol Use and Your Health    Drinking too much can harm your health.  Excessive alcohol use leads to about 88,000 death in the [place] each year, and shortens the life of those who diet by almost 30 years.  Further, excessive drinking cost the economy $249 billion in 2010.  Most excessive drinkers are not alcohol dependent.    Excessive alcohol use has immediate effects that increase the risk of many harmful health conditions.  These are most often the result of binge drinking.  Over time, excessive alcohol use can lead to the development of chronic diseases and other series health problems.    What is considered a \"drink\"?        Excessive alcohol use includes:  Binge Drinking: For women, 4 or more drinks consumed on one occasion. For men, 5 or more drinks consumed on one occasion.  Heavy Drinking: For women, 8 or more drinks per week. For men, 15 or more drinks per week  Any alcohol used by pregnant women  Any alcohol used by those under the age of 21 years    If you choose to drink, do so in moderation:  Do not drink at all if you are under the age of 21, or if you are or may be pregnant, or have health problems that could be made worse by drinking.  For women, up to 1 drink per day  For men, up to 2 drinks a day    No one should begin drinking or drink more frequently based on potential health benefits    Short-Term Health Risks:  Injuries: motor vehicle crashes, falls, drownings, burns  Violence: homicide, suicide, sexual assault, intimate partner " violence  Alcohol poisoning  Reproductive health: risky sexual behaviors, unintended prengnacy, sexually transmitted diseases, miscarriage, stillbirth, fetal alcohol syndrome    Long-Term Health Risks:  Chronic diseases: high blood pressure, heart disease, stroke, liver disease, digestive problems  Cancers: breast, mouth and throat, liver, colon  Learning and memory problems: dementia, poor school performance  Mental health: depression, anxiety, insomnia  Social problems: lost productivity, family problems, unemployment  Alcohol dependence    For support and more information:  Substance Abuse and Mental Health Services Administration  [address]  [place] [address]  Web Address: http://www.[facility].gov    Alcoholics Anonymous        Web Address: http://www.aa.org    https://www.cdc.gov/alcohol/fact-sheets/alcohol-use.htm     © Copyright [employer] 2018 Information is for End User's use only and may not be sold, redistributed or otherwise used for commercial purposes. All illustrations and images included in CareNotes® are the copyrighted property of [employer].A[employer]M., Inc. or [employer]

## 2024-10-31 NOTE — PROGRESS NOTES
Ambulatory Visit  Name: Sajan Day      : 1940      MRN: [id number]  Encounter Provider: Sheila Martinez MD  Encounter Date: 10/31/2024   Encounter department: [facility]    Assessment & Plan  Essential hypertension  BP Readings from Last 3 Encounters:   10/31/24 132/74   24 134/72   24 132/78     Controlled, continue atenolol 25 mg daily         Encounter for immunization    Orders:    influenza vaccine, high-dose, PF 0.5 mL (Fluzone High Dose)    Medicare annual wellness visit, subsequent         Stage 3a chronic kidney disease (HCC)  Lab Results   Component Value Date    EGFR 63 10/22/2024    EGFR 63 2024    EGFR 58 10/03/2023    CREATININE 1.08 10/22/2024    CREATININE 1.07 2024    CREATININE 1.15 10/03/2023     Kidney function stable.  Avoid nephrotoxic medications       Prostate cancer (HCC)  PSA stable.  Continue following with urology       Allergic rhinitis, unspecified seasonality, unspecified trigger  Trial Astelin nasal spray 1 spray in each nostril 2 times daily for allergic rhinitis  Orders:    azelastine (ASTELIN) 0.1 % nasal spray; 1 spray into each nostril 2 (two) times a day Use in each nostril as directed     AWV and follow-up completed today.  Reviewed previous labs.  Chronic conditions stable.  Continue current medications.  Preventive health issues were discussed with patient, and age appropriate screening tests were ordered as noted in patient's After Visit Summary. Personalized health advice and appropriate referrals for health education or preventive services given if needed, as noted in patient's After Visit Summary.    History of Present Illness     Patient presents with:  Medicare Wellness Visit    Patient presents today for AWV and follow-up.  Overall doing well.  Does endorse some allergic rhinitis and is taking Flonase.  Stable in the remainder of his medications.  Recently completed lab work and everything looks stable.        Patient Care Team:  Sheila Martinez MD as PCP - General (Family Medicine)  Tacos Lebron MD    Review of Systems   Constitutional:  Negative for activity change, fatigue and fever.   Eyes:  Negative for visual disturbance.   Respiratory:  Negative for shortness of breath.    Cardiovascular:  Negative for chest pain.   Gastrointestinal:  Negative for abdominal pain, constipation, diarrhea and nausea.   Endocrine: Negative for cold intolerance and heat intolerance.   Musculoskeletal:  Negative for back pain.   Skin:  Negative for rash.   Neurological:  Negative for headaches.   Psychiatric/Behavioral:  Negative for confusion.      Medical History Reviewed by provider this encounter:       Annual Wellness Visit Questionnaire   Sajan is here for his Subsequent Wellness visit. Last Medicare Wellness visit information reviewed, patient interviewed and updates made to the record today.      Health Risk Assessment:   Patient rates overall health as very good. Patient feels that their physical health rating is same. Patient is very satisfied with their life. Eyesight was rated as slightly worse. Hearing was rated as slightly worse. Patient feels that their emotional and mental health rating is same. Patients states they are never, rarely angry. Patient states they are sometimes unusually tired/fatigued. Pain experienced in the last 7 days has been none. Patient states that he has experienced no weight loss or gain in last 6 months.     Depression Screening:   PHQ-2 Score: 0      Fall Risk Screening:   In the past year, patient has experienced: no history of falling in past year      Home Safety:  Patient does not have trouble with stairs inside or outside of their home. Patient has working smoke alarms and has working carbon monoxide detector. Home safety hazards include: none.     Nutrition:   Current diet is No Added Salt and Limited junk food.     Medications:   Patient is currently taking  over-the-counter supplements. OTC medications include: see medication list. Patient is able to manage medications.     Activities of Daily Living (ADLs)/Instrumental Activities of Daily Living (IADLs):   Walk and transfer into and out of bed and chair?: Yes  Dress and groom yourself?: Yes    Bathe or shower yourself?: Yes    Feed yourself? Yes  Do your laundry/housekeeping?: Yes  Manage your money, pay your bills and track your expenses?: Yes  Make your own meals?: Yes    Do your own shopping?: Yes    Previous Hospitalizations:   Any hospitalizations or ED visits within the last 12 months?: No      Advance Care Planning:   Living will: Yes    Durable POA for healthcare: Yes    Advanced directive: Yes      Cognitive Screening:   Provider or family/friend/caregiver concerned regarding cognition?: No    PREVENTIVE SCREENINGS      Cardiovascular Screening:    General: Screening Not Indicated and History Lipid Disorder      Diabetes Screening:     General: Screening Current      Prostate Cancer Screening:    General: History Prostate Cancer and Screening Not Indicated      Lung Cancer Screening:     General: Screening Not Indicated    Screening, Brief Intervention, and Referral to Treatment (SBIRT)    Screening  Typical number of drinks in a day: 1  Typical number of drinks in a week: 5  Interpretation: Low risk drinking behavior.    AUDIT-C Screenin) How often did you have a drink containing alcohol in the past year? 4 or more times a week  2) How many drinks did you have on a typical day when you were drinking in the past year? 1 to 2  3) How often did you have 6 or more drinks on one occasion in the past year? never    AUDIT-C Score: 4  Interpretation: Score 4-12 (male): POSITIVE screen for alcohol misuse    AUDIT Screenin) How often during the last year have you found that you were not able to stop drinking once you had started? 0 - never  5) How often during the last year have you failed to do what was  normally expected from you because of drinking? 0 - never  6) How often during the last year have you needed a first drink in the morning to get yourself going after a heavy drinking session? 0 - never  7) How often during the last year have you had a feeling of guilt or remorse after drinking? 0 - never  8) How often during the last year have you been unable to remember what happened the night before because you had been drinking? 0 - never  9) Have you or someone else been injured as a result of your drinking? 0 - no  10) Has a relative or friend or a doctor or another health worker been concerned about your drinking or suggested you cut down? 0 - no    AUDIT Score: 4  Interpretation: Low risk alcohol consumption    Single Item Drug Screening:  How often have you used an illegal drug (including marijuana) or a prescription medication for non-medical reasons in the past year? never    Single Item Drug Screen Score: 0  Interpretation: Negative screen for possible drug use disorder    Social Determinants of Health     Financial Resource Strain: Low Risk  (10/7/2023)    Overall Financial Resource Strain (CARDIA)     Difficulty of Paying Living Expenses: Not hard at all   Food Insecurity: No Food Insecurity (10/24/2024)    Hunger Vital Sign     Worried About Running Out of Food in the Last Year: Never true     Ran Out of Food in the Last Year: Never true   Transportation Needs: No Transportation Needs (10/24/2024)    PRAPARE - Transportation     Lack of Transportation (Medical): No     Lack of Transportation (Non-Medical): No   Housing Stability: Low Risk  (10/24/2024)    Housing Stability Vital Sign     Unable to Pay for Housing in the Last Year: No     Number of Times Moved in the Last Year: 0     Homeless in the Last Year: No   Utilities: Not At Risk (10/24/2024)    [facility] Utilities     Threatened with loss of utilities: No     No results found.    Objective     /74 (BP Location: Left arm, Patient Position:  "Sitting, Cuff Size: Standard)   Pulse 63   Temp 97.6 °F (36.4 °C) (Temporal)   Resp 17   Ht 5' 9\" (1.753 m)   Wt 80.7 kg (178 lb)   SpO2 98%   BMI 26.29 kg/m²     Physical Exam  Vitals and nursing note reviewed.   Constitutional:       Appearance: Normal appearance. He is well-developed.   HENT:      Head: Normocephalic and atraumatic.   Cardiovascular:      Rate and Rhythm: Normal rate and regular rhythm.   Pulmonary:      Effort: Pulmonary effort is normal.      Breath sounds: Normal breath sounds.   Abdominal:      General: Bowel sounds are normal.      Palpations: Abdomen is soft.   Musculoskeletal:      Cervical back: Normal range of motion.   Skin:     General: Skin is warm.   Neurological:      General: No focal deficit present.      Mental Status: He is alert.   Psychiatric:         Mood and Affect: Mood normal.         Speech: Speech normal.         "

## 2024-11-15 ENCOUNTER — OFFICE VISIT (OUTPATIENT)
Dept: FAMILY MEDICINE CLINIC | Facility: CLINIC | Age: 84
End: 2024-11-15
Payer: MEDICARE

## 2024-11-15 ENCOUNTER — NURSE TRIAGE (OUTPATIENT)
Age: 84
End: 2024-11-15

## 2024-11-15 VITALS
HEIGHT: 69 IN | DIASTOLIC BLOOD PRESSURE: 72 MMHG | TEMPERATURE: 97.8 F | RESPIRATION RATE: 17 BRPM | WEIGHT: 181 LBS | BODY MASS INDEX: 26.81 KG/M2 | SYSTOLIC BLOOD PRESSURE: 144 MMHG | OXYGEN SATURATION: 99 % | HEART RATE: 58 BPM

## 2024-11-15 DIAGNOSIS — R11.0 NAUSEA: ICD-10-CM

## 2024-11-15 DIAGNOSIS — H81.13 BENIGN PAROXYSMAL POSITIONAL VERTIGO DUE TO BILATERAL VESTIBULAR DISORDER: Primary | ICD-10-CM

## 2024-11-15 DIAGNOSIS — N18.31 STAGE 3A CHRONIC KIDNEY DISEASE (HCC): ICD-10-CM

## 2024-11-15 PROCEDURE — 99214 OFFICE O/P EST MOD 30 MIN: CPT | Performed by: FAMILY MEDICINE

## 2024-11-15 PROCEDURE — G2211 COMPLEX E/M VISIT ADD ON: HCPCS | Performed by: FAMILY MEDICINE

## 2024-11-15 RX ORDER — MECLIZINE HYDROCHLORIDE 25 MG/1
25 TABLET ORAL 3 TIMES DAILY PRN
Qty: 30 TABLET | Refills: 0 | Status: SHIPPED | OUTPATIENT
Start: 2024-11-15

## 2024-11-15 RX ORDER — ONDANSETRON 4 MG/1
4 TABLET, ORALLY DISINTEGRATING ORAL EVERY 6 HOURS PRN
Qty: 20 TABLET | Refills: 0 | Status: SHIPPED | OUTPATIENT
Start: 2024-11-15

## 2024-11-15 NOTE — PROGRESS NOTES
Name: Sajan Day      : 1940      MRN: [id number]  Encounter Provider: Sheila Martinez MD  Encounter Date: 11/15/2024   Encounter department: [facility]  :  Assessment & Plan  Benign paroxysmal positional vertigo due to bilateral vestibular disorder  Neuroexam in office within normal limits.  [place]-Hallpike positive.  Instant symptoms when laying back of the room spinning and nausea.  Believe this is BPPV.  Will start patient on meclizine and Zofran for symptomatic treatment and refer to vestibular physical therapy.  Patient dated above a last night so there could be an underlying component of possible food poisoning but does not have any diarrhea.  If symptoms worsen over the weekend would like him to go to the ER for further evaluation for labs and imaging.  Discussed this in office with patient and wife.  Orders:    meclizine (ANTIVERT) 25 mg tablet; Take 1 tablet (25 mg total) by mouth 3 (three) times a day as needed for dizziness    ondansetron (ZOFRAN-ODT) 4 mg disintegrating tablet; Take 1 tablet (4 mg total) by mouth every 6 (six) hours as needed for nausea or vomiting    Ambulatory Referral to Physical Therapy; Future    Stage 3a chronic kidney disease (HCC)  Lab Results   Component Value Date    EGFR 63 10/22/2024    EGFR 63 2024    EGFR 58 10/03/2023    CREATININE 1.08 10/22/2024    CREATININE 1.07 2024    CREATININE 1.15 10/03/2023       Orders:    TSH + Free T4; Future    Comprehensive metabolic panel; Future    CBC and differential; Future    Nausea    Orders:    TSH + Free T4; Future    Comprehensive metabolic panel; Future    CBC and differential; Future           History of Present Illness     Patient presents with:  Dizziness: pt feeling dizzy after getting up to go to the bathroom in the middle of the  night with nausea, BP reading 162/93 sitting, 154/86 lying down       Felt dizzy going to the bathroom describes it as wobbly with a sensation of feeling  "that he is going to fall.  Makes him feel nauseous.  When he got back into bed after using the restroom over the middle the night he was able to fall back asleep but upon waking his dizziness was actually worse.  Did have sensation of room spinning in the morning feels okay sitting still worse with change in position.    Denies any weakness.  Denies any change in speech wife also denies any changes in speech feels strength is good.  Denies any palpitations denies any changes in vision.  Main symptom is feeling wobbly.     Took BP this am which was elevated at 162/83. Took his atenolol this am.     Dizziness  Associated symptoms include fatigue and nausea. Pertinent negatives include no chest pain, coughing or weakness.     Review of Systems   Constitutional:  Positive for fatigue.   Respiratory:  Negative for cough and shortness of breath.    Cardiovascular:  Negative for chest pain, palpitations and leg swelling.   Gastrointestinal:  Positive for nausea.   Neurological:  Positive for dizziness. Negative for syncope and weakness.          Objective   /72 (BP Location: Left arm, Patient Position: Sitting, Cuff Size: Large)   Pulse 58   Temp 97.8 °F (36.6 °C) (Temporal)   Resp 17   Ht 5' 9\" (1.753 m)   Wt 82.1 kg (181 lb)   SpO2 99%   BMI 26.73 kg/m²      Physical Exam  Vitals and nursing note reviewed.   Constitutional:       General: He is in acute distress.      Appearance: He is well-developed.   HENT:      Head: Normocephalic and atraumatic.   Cardiovascular:      Rate and Rhythm: Normal rate and regular rhythm.   Pulmonary:      Effort: Pulmonary effort is normal.      Breath sounds: Normal breath sounds.   Abdominal:      General: Bowel sounds are normal.      Palpations: Abdomen is soft.   Musculoskeletal:      Cervical back: Normal range of motion.   Skin:     General: Skin is warm.   Neurological:      Mental Status: He is alert.      Motor: Weakness present.      Comments: [place]-Hallpike positive "   Psychiatric:         Mood and Affect: Mood normal.         Speech: Speech normal.       Administrative Statements   I have spent a total time of 30 minutes in caring for this patient on the day of the visit/encounter including Diagnostic results, Prognosis, Risks and benefits of tx options, Instructions for management, Patient and family education, Importance of tx compliance, Risk factor reductions, Impressions, Documenting in the medical record, Reviewing / ordering tests, medicine, procedures  , and Obtaining or reviewing history  .

## 2024-11-15 NOTE — TELEPHONE ENCOUNTER
"Patient called in for an appointment and sent for triage.  Patient states that at about 2am this morning, he got up to use the bathroom and became very dizzy.  When he is lying down, he feels better.  States walking is wobbly and becomes nauseous while walking.  Does not feel lightheaded or faint.  Denies headache, visual changes, weakness or numbness.  Does endorse new head congestion that also started last night.  Denies fever or ear pain. /90, HR 66.  Patient has not taken his BP medications yet.     Patient has an appointment for evaluation this morning.       Reason for Disposition   MODERATE dizziness (e.g., interferes with normal activities)  (Exception: Dizziness caused by heat exposure, sudden standing, or poor fluid intake.)    Answer Assessment - Initial Assessment Questions  1. DESCRIPTION: \"Describe your dizziness.\"      When lying down it feels ok.  When he gets up gets wobbly and nauseous.   2. LIGHTHEADED: \"Do you feel lightheaded?\" (e.g., somewhat faint, woozy, weak upon standing)      Denies  3. VERTIGO: \"Do you feel like either you or the room is spinning or tilting?\" (i.e., vertigo)      Denies  4. SEVERITY: \"How bad is it?\"  \"Do you feel like you are going to faint?\" \"Can you stand and walk?\"      Wobbly  5. ONSET:  \"When did the dizziness begin?\"      2 am   6. AGGRAVATING FACTORS: \"Does anything make it worse?\" (e.g., standing, change in head position)      Walking  7. HEART RATE: \"Can you tell me your heart rate?\" \"How many beats in 15 seconds?\"  (Note: Not all patients can do this.)        154/90, 66  8. CAUSE: \"What do you think is causing the dizziness?\" (e.g., decreased fluids or food, diarrhea, emotional distress, heat exposure, new medicine, sudden standing, vomiting; unknown)      Unknown  9. RECURRENT SYMPTOM: \"Have you had dizziness before?\" If Yes, ask: \"When was the last time?\" \"What happened that time?\"      Denies  10. OTHER SYMPTOMS: \"Do you have any other symptoms?\" " (e.g., fever, chest pain, vomiting, diarrhea, bleeding)        Head congestion,    Protocols used: Dizziness-Adult-[place]

## 2024-11-15 NOTE — TELEPHONE ENCOUNTER
Regarding: /93, dizziness & nausea  ----- Message from Mary GARRETT sent at 11/15/2024  7:48 AM EST -----  Patient spouse called in stating Rodolfo was feeling dizzy after getting up to go to the bathroom in the middle of the night with nausea, BP reading 162/93 sitting, 154/86 lying down. I was able to schedule him for a same day appointment, but am asking nurse to call to make sure he doesn't need to be seen sooner. Warm transfer attempt unsuccessful.

## 2024-11-15 NOTE — ASSESSMENT & PLAN NOTE
Lab Results   Component Value Date    EGFR 63 10/22/2024    EGFR 63 04/09/2024    EGFR 58 10/03/2023    CREATININE 1.08 10/22/2024    CREATININE 1.07 04/09/2024    CREATININE 1.15 10/03/2023       Orders:    TSH + Free T4; Future    Comprehensive metabolic panel; Future    CBC and differential; Future

## 2024-11-15 NOTE — ASSESSMENT & PLAN NOTE
Orders:    TSH + Free T4; Future    Comprehensive metabolic panel; Future    CBC and differential; Future

## 2024-11-18 ENCOUNTER — APPOINTMENT (OUTPATIENT)
Dept: LAB | Facility: CLINIC | Age: 84
End: 2024-11-18
Payer: MEDICARE

## 2024-11-18 DIAGNOSIS — R11.0 NAUSEA: ICD-10-CM

## 2024-11-18 DIAGNOSIS — N18.31 STAGE 3A CHRONIC KIDNEY DISEASE (HCC): ICD-10-CM

## 2024-11-18 LAB
ALBUMIN SERPL BCG-MCNC: 4.3 G/DL (ref 3.5–5)
ALP SERPL-CCNC: 86 U/L (ref 34–104)
ALT SERPL W P-5'-P-CCNC: 20 U/L (ref 7–52)
ANION GAP SERPL CALCULATED.3IONS-SCNC: 10 MMOL/L (ref 4–13)
AST SERPL W P-5'-P-CCNC: 23 U/L (ref 13–39)
BASOPHILS # BLD AUTO: 0.03 THOUSANDS/ÂΜL (ref 0–0.1)
BASOPHILS NFR BLD AUTO: 0 % (ref 0–1)
BILIRUB SERPL-MCNC: 0.99 MG/DL (ref 0.2–1)
BUN SERPL-MCNC: 19 MG/DL (ref 5–25)
CALCIUM SERPL-MCNC: 9.2 MG/DL (ref 8.4–10.2)
CHLORIDE SERPL-SCNC: 103 MMOL/L (ref 96–108)
CO2 SERPL-SCNC: 28 MMOL/L (ref 21–32)
CREAT SERPL-MCNC: 1.04 MG/DL (ref 0.6–1.3)
EOSINOPHIL # BLD AUTO: 0.15 THOUSAND/ÂΜL (ref 0–0.61)
EOSINOPHIL NFR BLD AUTO: 2 % (ref 0–6)
ERYTHROCYTE [DISTWIDTH] IN BLOOD BY AUTOMATED COUNT: 12.4 % (ref 11.6–15.1)
GFR SERPL CREATININE-BSD FRML MDRD: 66 ML/MIN/1.73SQ M
GLUCOSE P FAST SERPL-MCNC: 102 MG/DL (ref 65–99)
HCT VFR BLD AUTO: 47.2 % (ref 36.5–49.3)
HGB BLD-MCNC: 16 G/DL (ref 12–17)
IMM GRANULOCYTES # BLD AUTO: 0.06 THOUSAND/UL (ref 0–0.2)
IMM GRANULOCYTES NFR BLD AUTO: 1 % (ref 0–2)
LYMPHOCYTES # BLD AUTO: 1.97 THOUSANDS/ÂΜL (ref 0.6–4.47)
LYMPHOCYTES NFR BLD AUTO: 22 % (ref 14–44)
MCH RBC QN AUTO: 32.4 PG (ref 26.8–34.3)
MCHC RBC AUTO-ENTMCNC: 33.9 G/DL (ref 31.4–37.4)
MCV RBC AUTO: 96 FL (ref 82–98)
MONOCYTES # BLD AUTO: 0.78 THOUSAND/ÂΜL (ref 0.17–1.22)
MONOCYTES NFR BLD AUTO: 9 % (ref 4–12)
NEUTROPHILS # BLD AUTO: 6.07 THOUSANDS/ÂΜL (ref 1.85–7.62)
NEUTS SEG NFR BLD AUTO: 66 % (ref 43–75)
NRBC BLD AUTO-RTO: 0 /100 WBCS
PLATELET # BLD AUTO: 254 THOUSANDS/UL (ref 149–390)
PMV BLD AUTO: 11.1 FL (ref 8.9–12.7)
POTASSIUM SERPL-SCNC: 4.1 MMOL/L (ref 3.5–5.3)
PROT SERPL-MCNC: 6.7 G/DL (ref 6.4–8.4)
RBC # BLD AUTO: 4.94 MILLION/UL (ref 3.88–5.62)
SODIUM SERPL-SCNC: 141 MMOL/L (ref 135–147)
T4 FREE SERPL-MCNC: 0.86 NG/DL (ref 0.61–1.12)
TSH SERPL DL<=0.05 MIU/L-ACNC: 5.39 UIU/ML (ref 0.45–4.5)
WBC # BLD AUTO: 9.06 THOUSAND/UL (ref 4.31–10.16)

## 2024-11-18 PROCEDURE — 84443 ASSAY THYROID STIM HORMONE: CPT

## 2024-11-18 PROCEDURE — 84439 ASSAY OF FREE THYROXINE: CPT

## 2024-11-18 PROCEDURE — 85025 COMPLETE CBC W/AUTO DIFF WBC: CPT

## 2024-11-18 PROCEDURE — 80053 COMPREHEN METABOLIC PANEL: CPT

## 2024-11-18 PROCEDURE — 36415 COLL VENOUS BLD VENIPUNCTURE: CPT

## 2024-11-22 ENCOUNTER — RESULTS FOLLOW-UP (OUTPATIENT)
Dept: FAMILY MEDICINE CLINIC | Facility: CLINIC | Age: 84
End: 2024-11-22

## 2024-11-22 DIAGNOSIS — R79.89 ELEVATED TSH: Primary | ICD-10-CM

## 2024-11-25 ENCOUNTER — EVALUATION (OUTPATIENT)
Dept: PHYSICAL THERAPY | Facility: CLINIC | Age: 84
End: 2024-11-25
Payer: MEDICARE

## 2024-11-25 DIAGNOSIS — H81.13 BENIGN PAROXYSMAL POSITIONAL VERTIGO DUE TO BILATERAL VESTIBULAR DISORDER: ICD-10-CM

## 2024-11-25 PROCEDURE — 97112 NEUROMUSCULAR REEDUCATION: CPT | Performed by: PHYSICAL THERAPIST

## 2024-11-25 PROCEDURE — 97162 PT EVAL MOD COMPLEX 30 MIN: CPT | Performed by: PHYSICAL THERAPIST

## 2024-11-25 NOTE — PROGRESS NOTES
PT EVALUATION    Today's date: 24  Patient name: Sajan Day  : 1940  MRN: [id number]  Referring provider: Sheila Martinez  Dx:   1. Benign paroxysmal positional vertigo due to bilateral vestibular disorder        ASSESSMENT:  Sajan Day is a 84 y.o. male who presents with signs and symptoms consistent of acute vertigo. Clinical examination demonstrated intact cranial nerves, negative neurological testing, and asymptomatic VBI testing. Thorofare Hallpike and Roll test bilaterally were negative with no symptoms or nystagmus. Secondary to objective testing and subjective reports it seems that BPPV has self resolved. Discussed with patient to monitor symptoms over the next week or two and if symptoms should return to call office for follow up. Evaluation only this date. Patient is discharged at this time.       Impairments:    Positional dizziness  VOR impairment   Dizziness with head turns     Prognosis:  Good  Positive and negative prognostic indicator(s):  multiple comorbidities    Goals:    Short Term Goals: to be achieved by 4 weeks  1) Patient to be independent with basic HEP.  2) Decrease dizziness to 0/10 at its worst.    Long Term Goals: to be achieved by discharge  1) Patient will demonstrate normalized BPPV testing in order to return to normal daily activities.   2) Patient will have little to no dizziness with positional changes and fast head movements in order to participate in daily activities.      Planned interventions:  home exercise program, patient education, canalith repositioning techniques, and vestibulo-ocular motor training    Duration in visits:  4  Frequency: 1 visits per week  Duration in weeks:  4    History of Current Injury: Patient notes that he got up in the middle of the night a little over a week ago to go to the bathroom and he felt very off balance. Patient notes that it was was the same thing when he woke up then as well so him and his wife got concerned and  called his PCP. Patient notes that he got in with his doctor that day. Patient notes that he tested for a stroke which he was worried about himself. Patient notes that then he got him up on the table and laid him back and turned his head to the right and the world started spinning. Patient notes that he got sick to his stomach but that went away and he was able to go home. Patient notes that since then over the last week he hasn't had any issues.     Pain location: chronic back pain     Imaging: n/a  Special Questions:   Dizziness: Yes   Dysphagia: No  Dysarthria: No  Diploplia: No  Drop Attacks: No  Numbness: Yes   Nausea: Yes   Nystagmus: No      Hobbies/Interests: staying active and playing.   Occupation: physics  Patient goals: Patient reports goals for physical therapy would be to check out if it is gone.       Objective     Concurrent Complaints  Positive for nausea/motion sickness and tinnitus (chronic. 3 days before he had a very loud ringing). Negative for headaches, visual change, hearing loss, memory loss, aural fullness, poor concentration and peripheral neuropathy    Active Range of Motion   Cervical/Thoracic Spine       Cervical    Flexion: Neck active flexion: WFL.   Extension: Neck active extension: WFL.      Left lateral flexion: Neck active lateral bend left: WFL.      Right lateral flexion: Neck active lateral bend right: WFL.      Left rotation: Neck active rotation left: WFL.  Right rotation: Neck active rotation right: WFL.     Neuro Exam:     Dizziness  Positive for vertigo and rocking or swaying.   Negative for disequilibrium, oscillopsia, motion sickness, light-headedness, diplopia and floating or swimming.     Exacerbating factors  Positive for bending over, looking up, turning head and supine to/from sitting.   Negative for rolling in bed, walking, optokinetic movement and walking in busy environment.     Symptoms   Intensity at best: 0/10  Intensity at worst: 7/10 and 6/10  Average  intensity: 2/10 and 3/10    Headaches   Patient reports headaches: No.     Cervical exam   Ligament Laxity Testing   Alar ligament: WNL  Sharp Joe: WNL  Modified VBI   Left: asymptomatic  Right: asymptomatic  Seated posture: forward head posture and internally rotated shoulders    Oculomotor exam   Oculomotor ROM: WNL  Resting nystagmus: not present   Gaze holding nystagmus: not present left  and not present right  Smooth pursuits: saccadic smooth pursuit  Vertical saccades: normal  Horizontal saccades: normal  Convergence: normal    Positional testing   [place]-Hallpike   Left posterior canal: WNL  Right posterior canal: WNL  Roll test   Left horizontal canal: WNL  Right horizontal canal: WNL          Precautions: HTN, history of cancer       Manuals 11/25                                                                Neuro Re-Ed             CRT  Not indicated                                                                                           Ther Ex                                                                                                                     Ther Activity                                       Gait Training                                       Modalities

## 2024-12-05 ENCOUNTER — APPOINTMENT (OUTPATIENT)
Dept: RADIOLOGY | Facility: MEDICAL CENTER | Age: 84
End: 2024-12-05
Payer: MEDICARE

## 2024-12-05 ENCOUNTER — OFFICE VISIT (OUTPATIENT)
Dept: FAMILY MEDICINE CLINIC | Facility: CLINIC | Age: 84
End: 2024-12-05
Payer: MEDICARE

## 2024-12-05 VITALS
OXYGEN SATURATION: 97 % | DIASTOLIC BLOOD PRESSURE: 65 MMHG | RESPIRATION RATE: 18 BRPM | BODY MASS INDEX: 27.25 KG/M2 | HEIGHT: 69 IN | TEMPERATURE: 97.5 F | SYSTOLIC BLOOD PRESSURE: 138 MMHG | WEIGHT: 184 LBS | HEART RATE: 64 BPM

## 2024-12-05 DIAGNOSIS — M19.032 ARTHRITIS OF LEFT WRIST: Primary | ICD-10-CM

## 2024-12-05 DIAGNOSIS — M19.032 ARTHRITIS OF LEFT WRIST: ICD-10-CM

## 2024-12-05 PROCEDURE — G2211 COMPLEX E/M VISIT ADD ON: HCPCS | Performed by: FAMILY MEDICINE

## 2024-12-05 PROCEDURE — 99213 OFFICE O/P EST LOW 20 MIN: CPT | Performed by: FAMILY MEDICINE

## 2024-12-05 PROCEDURE — 73110 X-RAY EXAM OF WRIST: CPT

## 2024-12-05 NOTE — PROGRESS NOTES
Name: Sajan Day      : 1940      MRN: [id number]  Encounter Provider: Sheila Martinez MD  Encounter Date: 2024   Encounter department: [facility]  :  Assessment & Plan  Arthritis of left wrist  Exam unremarkable. Symptoms likely due to arthritis.   Discussed xray with arthritis but unlikely to . Patient is requesting Xray.  Continue Tylenol 1000 mg TID prn for pain.   Negative tinel and Phalen.  Patient does have a left over carpal tunnel brace from his mother which he is going to try to see if it relieves any of his pain.  Orders:    XR wrist 3+ vw left; Future           History of Present Illness     Patient presents with:  Wrist Pain: Left wrist    Off and on. Worse with activity.       Review of Systems   Musculoskeletal:         Left wrist pain        Medical History Reviewed by provider this encounter:     .  Current Outpatient Medications on File Prior to Visit   Medication Sig Dispense Refill    aspirin (ECOTRIN LOW STRENGTH) 81 mg EC tablet Take 81 mg by mouth 2 (two) times a day      atenolol (TENORMIN) 25 mg tablet TAKE 1 TABLET DAILY 90 tablet 1    azelastine (ASTELIN) 0.1 % nasal spray 1 spray into each nostril 2 (two) times a day Use in each nostril as directed 30 mL 0    Calcium Carbonate-Vit D-Min (CALCIUM 1200 PO) Take 1 capsule by mouth daily with breakfast      cholecalciferol (VITAMIN D3) 1,000 units tablet Take 1,000 Units by mouth 2 (two) times a day       clobetasol (TEMOVATE) 0.05 % external solution       co-enzyme Q-10 30 MG capsule Take 30 mg by mouth 3 (three) times a day      CRANBERRY PO Take by mouth daily To prevent gout      diphenhydrAMINE (BENADRYL) 50 MG tablet Take 50 mg by mouth daily at bedtime as needed for itching Sinus drainage/sleep aid      glucosamine-chondroitin 500-400 MG tablet Take 1 tablet by mouth 2 (two) times a day with meals       ketoconazole (NIZORAL) 2 % shampoo       meclizine (ANTIVERT) 25 mg tablet  "Take 1 tablet (25 mg total) by mouth 3 (three) times a day as needed for dizziness 30 tablet 0    Misc Natural Products (Tart Cherry Advanced) CAPS Take 1 capsule by mouth daily with breakfast 1000 mg to prevent gout      montelukast (SINGULAIR) 10 mg tablet Take 1 tablet (10 mg total) by mouth daily at bedtime 90 tablet 3    niacin 500 mg tablet Take 500 mg by mouth 2 (two) times a day      nitroglycerin (NITROSTAT) 0.4 mg SL tablet Place 1 tablet (0.4 mg total) under the tongue every 5 (five) minutes as needed for chest pain 30 tablet 1    ondansetron (ZOFRAN-ODT) 4 mg disintegrating tablet Take 1 tablet (4 mg total) by mouth every 6 (six) hours as needed for nausea or vomiting 20 tablet 0    rosuvastatin (CRESTOR) 5 mg tablet TAKE 1 TABLET DAILY AT BEDTIME 90 tablet 1    Zinc 50 MG CAPS Take 1 capsule by mouth daily      Omega-3 Fatty Acids (fish oil) 1,000 mg Take 1,000 mg by mouth daily       No current facility-administered medications on file prior to visit.      Social History     Tobacco Use    Smoking status: Never    Smokeless tobacco: Never   Vaping Use    Vaping status: Never Used   Substance and Sexual Activity    Alcohol use: Yes     Alcohol/week: 4.0 standard drinks of alcohol     Types: 4 Glasses of wine per week     Comment: 1 glass of red wine per day on advice of cardiologist    Drug use: Never    Sexual activity: Not Currently     Partners: Female        Objective   /65 (BP Location: Left arm, Patient Position: Sitting, Cuff Size: Large)   Pulse 64   Temp 97.5 °F (36.4 °C) (Temporal)   Resp 18   Ht 5' 9\" (1.753 m)   Wt 83.5 kg (184 lb)   SpO2 97%   BMI 27.17 kg/m²      Physical Exam  Constitutional:       Appearance: Normal appearance.   HENT:      Head: Normocephalic and atraumatic.   Musculoskeletal:         General: Tenderness present. No swelling.      Comments: Negative [place]/Phalen    Neurological:      General: No focal deficit present.      Mental Status: He is alert and " oriented to person, place, and time.   Psychiatric:         Mood and Affect: Mood normal.         Behavior: Behavior normal.

## 2024-12-06 ENCOUNTER — RESULTS FOLLOW-UP (OUTPATIENT)
Dept: FAMILY MEDICINE CLINIC | Facility: CLINIC | Age: 84
End: 2024-12-06

## 2025-01-13 ENCOUNTER — APPOINTMENT (OUTPATIENT)
Dept: LAB | Facility: CLINIC | Age: 85
End: 2025-01-13
Payer: MEDICARE

## 2025-01-13 DIAGNOSIS — I25.10 CORONARY ARTERY DISEASE INVOLVING NATIVE CORONARY ARTERY OF NATIVE HEART WITHOUT ANGINA PECTORIS: ICD-10-CM

## 2025-01-13 DIAGNOSIS — R79.89 ELEVATED TSH: ICD-10-CM

## 2025-01-13 LAB
ANION GAP SERPL CALCULATED.3IONS-SCNC: 4 MMOL/L (ref 4–13)
BUN SERPL-MCNC: 21 MG/DL (ref 5–25)
CALCIUM SERPL-MCNC: 8.8 MG/DL (ref 8.4–10.2)
CHLORIDE SERPL-SCNC: 107 MMOL/L (ref 96–108)
CHOLEST SERPL-MCNC: 121 MG/DL (ref ?–200)
CO2 SERPL-SCNC: 31 MMOL/L (ref 21–32)
CREAT SERPL-MCNC: 0.88 MG/DL (ref 0.6–1.3)
ERYTHROCYTE [DISTWIDTH] IN BLOOD BY AUTOMATED COUNT: 12.2 % (ref 11.6–15.1)
GFR SERPL CREATININE-BSD FRML MDRD: 79 ML/MIN/1.73SQ M
GLUCOSE P FAST SERPL-MCNC: 101 MG/DL (ref 65–99)
HCT VFR BLD AUTO: 46.4 % (ref 36.5–49.3)
HDLC SERPL-MCNC: 36 MG/DL
HGB BLD-MCNC: 15.3 G/DL (ref 12–17)
LDLC SERPL CALC-MCNC: 60 MG/DL (ref 0–100)
MCH RBC QN AUTO: 31.9 PG (ref 26.8–34.3)
MCHC RBC AUTO-ENTMCNC: 33 G/DL (ref 31.4–37.4)
MCV RBC AUTO: 97 FL (ref 82–98)
NONHDLC SERPL-MCNC: 85 MG/DL
PLATELET # BLD AUTO: 240 THOUSANDS/UL (ref 149–390)
PMV BLD AUTO: 10.7 FL (ref 8.9–12.7)
POTASSIUM SERPL-SCNC: 3.8 MMOL/L (ref 3.5–5.3)
RBC # BLD AUTO: 4.79 MILLION/UL (ref 3.88–5.62)
SODIUM SERPL-SCNC: 142 MMOL/L (ref 135–147)
T4 FREE SERPL-MCNC: 0.75 NG/DL (ref 0.61–1.12)
TRIGL SERPL-MCNC: 123 MG/DL (ref ?–150)
TSH SERPL DL<=0.05 MIU/L-ACNC: 5.38 UIU/ML (ref 0.45–4.5)
WBC # BLD AUTO: 7.05 THOUSAND/UL (ref 4.31–10.16)

## 2025-01-13 PROCEDURE — 80048 BASIC METABOLIC PNL TOTAL CA: CPT

## 2025-01-13 PROCEDURE — 84443 ASSAY THYROID STIM HORMONE: CPT

## 2025-01-13 PROCEDURE — 80061 LIPID PANEL: CPT

## 2025-01-13 PROCEDURE — 85027 COMPLETE CBC AUTOMATED: CPT

## 2025-01-13 PROCEDURE — 36415 COLL VENOUS BLD VENIPUNCTURE: CPT

## 2025-01-13 PROCEDURE — 84439 ASSAY OF FREE THYROXINE: CPT

## 2025-01-27 NOTE — ASSESSMENT & PLAN NOTE
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  He has had carotid studies done in the past and would like an updated study. VAS Carotid study ordered

## 2025-01-27 NOTE — PROGRESS NOTES
[facility]  General Cardiology Note  Sajan Day  1940  [id number]      Referring Provider - No ref. provider found  Chief Complaint   Patient presents with    Follow-up    Numbness     In his left hand        Assessment & Plan  Coronary artery disease involving native coronary artery of native heart without angina pectoris  Mid LAD stent 2002 for exertional angina  No current symptoms, active lifestyle, he is on appropriate medical therapy, atenolol, aspirin, low-dose statin, niacin.  He has had carotid studies done in the past and would like an updated study. VAS Carotid study ordered  Essential hypertension  /76  HR 66  Continue current regimen  Mixed hyperlipidemia  Primarily low HDL  On niacin, HDL 36  LDL 60  On rosuvastatin 5 mg daily  Stage 3a chronic kidney disease (HCC)  Lab Results   Component Value Date    EGFR 79 01/13/2025    EGFR 66 11/18/2024    EGFR 63 10/22/2024    CREATININE 0.88 01/13/2025    CREATININE 1.04 11/18/2024    CREATININE 1.08 10/22/2024   stable       Procedure/testing if ordered:  Risks Vs benefits discussed   Medication side effects reviewed with patient in detail and all their questions answered to their satisfaction.      Will RTO in 1 year or sooner if necessary  Patient / Caretaker was advised and educated to call our office  immediately if  patient has any new symptoms of chest pain/shortness of breath, near-syncope, syncope, light headedness sustained palpitations  or any other cardiovascular symptoms before their scheduled follow-up appointment.  ED precautions reviewed    Interval History: 84 y.o.  male  with PMH as below is here for routine visit  Patient of Dr. Galindo    Denies any acute symptoms   He plays pickle ball when it's warm out  He complains of left wrist pain due to carpal tunnel syndrome. Is thinking of going back to the gym      Patient Active Problem List    Diagnosis Date Noted    Tinnitus of both ears 10/31/2024    Acute  pain of right knee 05/29/2024    Nausea 04/24/2024    Personal history of colonic polyps 04/24/2024    Stage 3a chronic kidney disease (HCC) 04/13/2023    Seasonal allergic rhinitis due to pollen 04/13/2023    Impaired fasting glucose 09/13/2022    Coronary artery disease involving native coronary artery of native heart without angina pectoris 03/10/2021    Sciatic pain, left 03/10/2021    Essential hypertension 03/02/2020    Mixed hyperlipidemia 03/02/2020    Prostate cancer ([facility]) 07/16/2018     Past Medical History:   Diagnosis Date    Allergic 1970    Cancer (HCC) 4/2009    Coronary artery disease     Erectile dysfunction     Heart disease     Postnasal drip 03/10/2021    Prostate cancer ([facility])      Social History     Tobacco Use    Smoking status: Never    Smokeless tobacco: Never   Vaping Use    Vaping status: Never Used   Substance Use Topics    Alcohol use: Yes     Alcohol/week: 4.0 standard drinks of alcohol     Types: 4 Glasses of wine per week     Comment: 1 glass of red wine per day on advice of cardiologist    Drug use: Never      Family History   Problem Relation Age of Onset    Prostate cancer Father      Past Surgical History:   Procedure Laterality Date    CAROTID STENT      HERNIA REPAIR      PROSTATECTOMY         Current Outpatient Medications:     aspirin (ECOTRIN LOW STRENGTH) 81 mg EC tablet, Take 81 mg by mouth 2 (two) times a day, Disp: , Rfl:     atenolol (TENORMIN) 25 mg tablet, TAKE 1 TABLET DAILY, Disp: 90 tablet, Rfl: 1    azelastine (ASTELIN) 0.1 % nasal spray, 1 spray into each nostril 2 (two) times a day Use in each nostril as directed, Disp: 30 mL, Rfl: 0    Calcium Carbonate-Vit D-Min (CALCIUM 1200 PO), Take 1 capsule by mouth daily with breakfast, Disp: , Rfl:     cholecalciferol (VITAMIN D3) 1,000 units tablet, Take 1,000 Units by mouth 2 (two) times a day , Disp: , Rfl:     clobetasol (TEMOVATE) 0.05 % external solution, , Disp: , Rfl:     co-enzyme Q-10 30 MG capsule, Take 30 mg  by mouth 3 (three) times a day, Disp: , Rfl:     CRANBERRY PO, Take by mouth daily To prevent gout, Disp: , Rfl:     diphenhydrAMINE (BENADRYL) 50 MG tablet, Take 50 mg by mouth daily at bedtime as needed for itching Sinus drainage/sleep aid, Disp: , Rfl:     glucosamine-chondroitin 500-400 MG tablet, Take 1 tablet by mouth 2 (two) times a day with meals , Disp: , Rfl:     ketoconazole (NIZORAL) 2 % shampoo, , Disp: , Rfl:     Misc Natural Products (Tart Cherry Advanced) CAPS, Take 1 capsule by mouth daily with breakfast 1000 mg to prevent gout, Disp: , Rfl:     montelukast (SINGULAIR) 10 mg tablet, Take 1 tablet (10 mg total) by mouth daily at bedtime, Disp: 90 tablet, Rfl: 3    niacin 500 mg tablet, Take 500 mg by mouth 2 (two) times a day, Disp: , Rfl:     nitroglycerin (NITROSTAT) 0.4 mg SL tablet, Place 1 tablet (0.4 mg total) under the tongue every 5 (five) minutes as needed for chest pain, Disp: 30 tablet, Rfl: 1    rosuvastatin (CRESTOR) 5 mg tablet, TAKE 1 TABLET DAILY AT BEDTIME, Disp: 90 tablet, Rfl: 1    Zinc 50 MG CAPS, Take 1 capsule by mouth daily, Disp: , Rfl:     Allergies   Allergen Reactions    Grass Extracts [Gramineae Pollens] Allergic Rhinitis       Vitals:    01/28/25 0939   BP: 130/76   BP Location: Left arm   Patient Position: Sitting   Cuff Size: Large   Pulse: 66   SpO2: 95%   Weight: 80.7 kg (178 lb)        Vitals:    01/28/25 0939   Weight: 80.7 kg (178 lb)          Body mass index is 26.29 kg/m².    Labs:   Lab Results   Component Value Date/Time    CHOLESTEROL 121 01/13/2025 07:40 AM    TRIG 123 01/13/2025 07:40 AM    HDL 36 (L) 01/13/2025 07:40 AM    LDLCALC 60 01/13/2025 07:40 AM      Lab Results   Component Value Date    SODIUM 142 01/13/2025    K 3.8 01/13/2025     01/13/2025    CREATININE 0.88 01/13/2025    EGFR 79 01/13/2025    BUN 21 01/13/2025    CO2 31 01/13/2025    ALT 20 11/18/2024    AST 23 11/18/2024    TSH 3.38 11/18/2020    GLUF 101 (H) 01/13/2025    HGBA1C 5.8 (H)  10/22/2024    WBC 7.05 01/13/2025    HGB 15.3 01/13/2025    HCT 46.4 01/13/2025     01/13/2025         Imaging: No results found.    ECG:  Normal sinus rhythm. 66 bpm   Reviewed by RADHA Alvarado      Review of Systems   Constitutional: Negative.   Cardiovascular: Negative.    Respiratory: Negative.     Neurological:  Positive for numbness. Weakness: left wrist.    Except as noted in HPI, is otherwise reviewed in detail and a 12 point review of systems is negative.    Physical Exam  Vitals reviewed.   Constitutional:       General: He is not in acute distress.     Appearance: Normal appearance. He is not diaphoretic.   HENT:      Head: Normocephalic and atraumatic.   Eyes:      General: No scleral icterus.     Extraocular Movements: Extraocular movements intact.   Cardiovascular:      Rate and Rhythm: Normal rate and regular rhythm.      Pulses: Normal pulses.           Radial pulses are 2+ on the right side and 2+ on the left side.      Heart sounds: Normal heart sounds, S1 normal and S2 normal.   Pulmonary:      Effort: Pulmonary effort is normal. No tachypnea or respiratory distress.      Breath sounds: Normal breath sounds. No wheezing or rales.   Abdominal:      General: There is no distension.   Musculoskeletal:      Right lower leg: No edema.      Left lower leg: No edema.   Skin:     General: Skin is warm and dry.      Capillary Refill: Capillary refill takes less than 2 seconds.   Neurological:      Mental Status: He is alert and oriented to person, place, and time.      Gait: Gait normal.   Psychiatric:         Mood and Affect: Mood normal.         Behavior: Behavior normal.          **Please Note: This note may have been constructed using a voice recognition system**     Thank you for the opportunity to participate in the care of this patient.   RADHA Alvarado

## 2025-01-27 NOTE — ASSESSMENT & PLAN NOTE
Lab Results   Component Value Date    EGFR 79 01/13/2025    EGFR 66 11/18/2024    EGFR 63 10/22/2024    CREATININE 0.88 01/13/2025    CREATININE 1.04 11/18/2024    CREATININE 1.08 10/22/2024   stable

## 2025-01-28 ENCOUNTER — OFFICE VISIT (OUTPATIENT)
Dept: CARDIOLOGY CLINIC | Facility: CLINIC | Age: 85
End: 2025-01-28
Payer: COMMERCIAL

## 2025-01-28 VITALS
DIASTOLIC BLOOD PRESSURE: 76 MMHG | WEIGHT: 178 LBS | HEART RATE: 66 BPM | OXYGEN SATURATION: 95 % | BODY MASS INDEX: 26.29 KG/M2 | SYSTOLIC BLOOD PRESSURE: 130 MMHG

## 2025-01-28 DIAGNOSIS — I10 ESSENTIAL HYPERTENSION: ICD-10-CM

## 2025-01-28 DIAGNOSIS — I25.10 CORONARY ARTERY DISEASE INVOLVING NATIVE CORONARY ARTERY OF NATIVE HEART WITHOUT ANGINA PECTORIS: Primary | ICD-10-CM

## 2025-01-28 DIAGNOSIS — N18.31 STAGE 3A CHRONIC KIDNEY DISEASE (HCC): ICD-10-CM

## 2025-01-28 DIAGNOSIS — E78.2 MIXED HYPERLIPIDEMIA: ICD-10-CM

## 2025-01-28 PROCEDURE — 99214 OFFICE O/P EST MOD 30 MIN: CPT

## 2025-01-28 PROCEDURE — 93000 ELECTROCARDIOGRAM COMPLETE: CPT

## 2025-01-28 NOTE — PATIENT INSTRUCTIONS
Continue current medications  Complete your carotid study  Please call with questions and concerns

## 2025-02-04 ENCOUNTER — HOSPITAL ENCOUNTER (OUTPATIENT)
Dept: RADIOLOGY | Facility: MEDICAL CENTER | Age: 85
Discharge: HOME | End: 2025-02-04
Payer: COMMERCIAL

## 2025-02-04 DIAGNOSIS — I25.10 CORONARY ARTERY DISEASE INVOLVING NATIVE CORONARY ARTERY OF NATIVE HEART WITHOUT ANGINA PECTORIS: ICD-10-CM

## 2025-02-04 DIAGNOSIS — I10 ESSENTIAL HYPERTENSION: ICD-10-CM

## 2025-02-04 DIAGNOSIS — E78.2 MIXED HYPERLIPIDEMIA: ICD-10-CM

## 2025-02-04 PROCEDURE — 93880 EXTRACRANIAL BILAT STUDY: CPT

## 2025-02-04 PROCEDURE — 93880 EXTRACRANIAL BILAT STUDY: CPT | Performed by: SURGERY

## 2025-02-11 ENCOUNTER — TELEPHONE (OUTPATIENT)
Age: 85
End: 2025-02-11

## 2025-02-11 NOTE — TELEPHONE ENCOUNTER
Patient called and would like to have Dr Martinez place his labs into his chart prior to his follow up in May.

## 2025-02-13 DIAGNOSIS — N18.31 STAGE 3A CHRONIC KIDNEY DISEASE (HCC): Primary | ICD-10-CM

## 2025-02-13 DIAGNOSIS — E03.8 SUBCLINICAL HYPOTHYROIDISM: ICD-10-CM

## 2025-02-13 DIAGNOSIS — E78.2 MIXED HYPERLIPIDEMIA: ICD-10-CM

## 2025-03-13 DIAGNOSIS — I25.10 CORONARY ARTERY DISEASE INVOLVING NATIVE CORONARY ARTERY OF NATIVE HEART WITHOUT ANGINA PECTORIS: ICD-10-CM

## 2025-03-13 DIAGNOSIS — I10 ESSENTIAL HYPERTENSION: ICD-10-CM

## 2025-03-13 DIAGNOSIS — E78.2 MIXED HYPERLIPIDEMIA: ICD-10-CM

## 2025-03-14 RX ORDER — ATENOLOL 25 MG/1
25 TABLET ORAL DAILY
Qty: 90 TABLET | Refills: 1 | Status: SHIPPED | OUTPATIENT
Start: 2025-03-14

## 2025-03-14 RX ORDER — ROSUVASTATIN CALCIUM 5 MG/1
5 TABLET, COATED ORAL
Qty: 90 TABLET | Refills: 1 | Status: SHIPPED | OUTPATIENT
Start: 2025-03-14

## 2025-03-19 ENCOUNTER — OFFICE VISIT (OUTPATIENT)
Dept: OBGYN CLINIC | Facility: CLINIC | Age: 85
End: 2025-03-19
Payer: COMMERCIAL

## 2025-03-19 VITALS — BODY MASS INDEX: 26.36 KG/M2 | HEIGHT: 69 IN | WEIGHT: 178 LBS

## 2025-03-19 DIAGNOSIS — M18.12 ARTHRITIS OF CARPOMETACARPAL (CMC) JOINT OF LEFT THUMB: Primary | ICD-10-CM

## 2025-03-19 PROCEDURE — 99203 OFFICE O/P NEW LOW 30 MIN: CPT | Performed by: SURGERY

## 2025-03-19 NOTE — PROGRESS NOTES
Nael Tinajero M.D.  Attending, Orthopaedic Surgery  Hand, Wrist, and Elbow Surgery  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  This is consistent with cmc osteoarthritis. Reviewed conservative management including heat, antiinflammatories, topical antiinflammatories, bracing and corticosteroid injection. The patient was provided with a comfort cool brace. All of their questions were answered in detail today.  The patient is agreeable to this plan.  They will follow-up in clinic if symptoms worsen and he would like an injection.       Orders:    Durable Medical Equipment             The patient verbalized understanding of exam findings and treatment plan. We engaged in the shared decision-making process and treatment options were discussed at length with the patient. Surgical and conservative management discussed today along with risks and benefits.      Follow Up:  Return if symptoms worsen or fail to improve.    To Do Next Visit:  Re-evaluation of current issue      General Discussions:  CMC Arthritis: The anatomy and physiology of carpometacarpal joint arthritis was discussed with the patient today in the office.  Deterioration of the articular cartilage eventually leads to hypermobility at the thumb CMC joint, resulting in joint subluxation, osteophyte formation, cystic changes within the trapezium and base of the first metacarpal, as well as subchondral sclerosis.  Eventually, pain, limited mobility, and compensatory hyperextension at the metacarpophalangeal joint may develop.  While normal activity and usage of the thumb joint may provide a painful experience to the patient, this typically does not result in damage to the thumb or hand.  Treatment options include  resting thumb spica splints to decreased joint edema, pain, and inflammation.  Therapy exercises to strengthen the thenar musculature may relieve pain, but do not alter the overall continued development of osteoarthritis.  Oral medications, topical medications, corticosteroid injections may decrease pain and increase overall function.  Eventually, approximately 5% of patients may require surgical intervention.                                                                                                                                                                                                       ____________________________________________________________________________________________________________________________________________      CHIEF COMPLAINT:  Chief Complaint   Patient presents with    Left Wrist - Pain     No injury, started to bother him in October. When he does things it bothers him.        SUBJECTIVE:  Sajan Day is a 85 y.o. year old RHD male who presents for evaluation of left wrist. He reports that he has had pain since the fall. He denies a mechanism of injury.  He reports diffuse pain throughout his wrist.  His pain is worse with activity such as taking a bubble or a cup of coffee.  He also has pain with flyfishing.  He does not have much pain at rest, but is aware of his symptoms.  He presents to clinic today for initial evaluation.     I have personally reviewed all the relevant PMH, PSH, SH, FH, Medications and allergies      PAST MEDICAL HISTORY:  Past Medical History:   Diagnosis Date    Allergic 1970    Cancer (HCC) 4/2009    Coronary artery disease     Erectile dysfunction     Heart disease     Postnasal drip 03/10/2021    Prostate cancer (HCC)        PAST SURGICAL HISTORY:  Past Surgical History:   Procedure Laterality Date    CAROTID STENT      HERNIA REPAIR      PROSTATECTOMY         FAMILY HISTORY:  Family History   Problem Relation Age of Onset    Prostate cancer Father         SOCIAL HISTORY:  Social History     Tobacco Use    Smoking status: Never    Smokeless tobacco: Never   Vaping Use    Vaping status: Never Used   Substance Use Topics    Alcohol use: Yes     Alcohol/week: 4.0 standard drinks of alcohol     Types: 4 Glasses of wine per week     Comment: 1 glass of red wine per day on advice of cardiologist    Drug use: Never       MEDICATIONS:    Current Outpatient Medications:     aspirin (ECOTRIN LOW STRENGTH) 81 mg EC tablet, Take 81 mg by mouth 2 (two) times a day, Disp: , Rfl:     atenolol (TENORMIN) 25 mg tablet, Take 1 tablet (25 mg total) by mouth daily, Disp: 90 tablet, Rfl: 1    azelastine (ASTELIN) 0.1 % nasal spray, 1 spray into each nostril 2 (two) times a day Use in each nostril as directed, Disp: 30 mL, Rfl: 0    Calcium Carbonate-Vit D-Min (CALCIUM 1200 PO), Take 1 capsule by mouth daily with breakfast, Disp: , Rfl:     cholecalciferol (VITAMIN D3) 1,000 units tablet, Take 1,000 Units by mouth 2 (two) times a day , Disp: , Rfl:     clobetasol (TEMOVATE) 0.05 % external solution, , Disp: , Rfl:     co-enzyme Q-10 30 MG capsule, Take 30 mg by mouth 3 (three) times a day, Disp: , Rfl:     CRANBERRY PO, Take by mouth daily To prevent gout, Disp: , Rfl:     diphenhydrAMINE (BENADRYL) 50 MG tablet, Take 50 mg by mouth daily at bedtime as needed for itching Sinus drainage/sleep aid, Disp: , Rfl:     glucosamine-chondroitin 500-400 MG tablet, Take 1 tablet by mouth 2 (two) times a day with meals , Disp: , Rfl:     ketoconazole (NIZORAL) 2 % shampoo, , Disp: , Rfl:     Misc Natural Products (Tart Cherry Advanced) CAPS, Take 1 capsule by mouth daily with breakfast 1000 mg to prevent gout, Disp: , Rfl:     montelukast (SINGULAIR) 10 mg tablet, Take 1 tablet (10 mg total) by mouth daily at bedtime, Disp: 90 tablet, Rfl: 3    niacin 500 mg tablet, Take 500 mg by mouth 2 (two) times a day, Disp: , Rfl:     nitroglycerin (NITROSTAT) 0.4 mg SL tablet, Place 1 tablet (0.4 mg  total) under the tongue every 5 (five) minutes as needed for chest pain, Disp: 30 tablet, Rfl: 1    rosuvastatin (CRESTOR) 5 mg tablet, Take 1 tablet (5 mg total) by mouth daily at bedtime, Disp: 90 tablet, Rfl: 1    Zinc 50 MG CAPS, Take 1 capsule by mouth daily, Disp: , Rfl:     ALLERGIES:  Allergies   Allergen Reactions    Grass Extracts [Gramineae Pollens] Allergic Rhinitis           REVIEW OF SYSTEMS:  Review of Systems    VITALS:  There were no vitals filed for this visit.    LABS:      _____________________________________________________  PHYSICAL EXAMINATION:  General: well developed and well nourished, alert, oriented times 3, and appears comfortable  Psychiatric: Normal  HEENT: Normocephalic, Atraumatic Trachea Midline, No torticollis  Pulmonary: No audible wheezing or respiratory distress   Abdomen/GI: Non tender, non distended   Cardiovascular: No pitting edema, 2+ radial pulse   Skin: No masses, erythema, lacerations, fluctation, ulcerations  Neurovascular: Sensation Intact to the Median, Ulnar, Radial Nerve, Motor Intact to the Median, Ulnar, Radial Nerve, and Pulses Intact  Musculoskeletal: Normal, except as noted in detailed exam and in HPI.      MUSCULOSKELETAL EXAMINATION:    left CMC Exam:  No adduction contracture  No hyperextension deformity of MCP joint  Positive localized tenderness over radial and dorsal aspect of thumb (CMC joint)  Grind test is Negative for pain and Negative for crepitus  Metacarpal load shift test negative  No triggering or tenderness over the A1 pulley  No pain with Finkelstein’s maneuver          ___________________________________________________  STUDIES REVIEWED:  I have personally reviewed and interpreted  AP lateral and oblique radiographs of the left wrist   which demonstrate left thumb cmc osteoarthritis.          LABS REVIEWED:    HgA1c:   Lab Results   Component Value Date    HGBA1C 5.8 (H) 10/22/2024     BMP:   Lab Results   Component Value Date    CALCIUM 8.8  01/13/2025    K 3.8 01/13/2025    CO2 31 01/13/2025     01/13/2025    BUN 21 01/13/2025    CREATININE 0.88 01/13/2025               PROCEDURES PERFORMED:  Procedures  No Procedures performed today    _____________________________________________________      Scribe Attestation      I,:  Daria Clarke am acting as a scribe while in the presence of the attending physician.:       I,:  Nael Tinajero MD personally performed the services described in this documentation    as scribed in my presence.:

## 2025-03-19 NOTE — ASSESSMENT & PLAN NOTE
We reviewed their current history, physical exam, and imaging in detail today with the patient.  This is consistent with cmc osteoarthritis. Reviewed conservative management including heat, antiinflammatories, topical antiinflammatories, bracing and corticosteroid injection. The patient was provided with a comfort cool brace. All of their questions were answered in detail today.  The patient is agreeable to this plan.  They will follow-up in clinic if symptoms worsen and he would like an injection.       Orders:    Durable Medical Equipment

## 2025-04-04 ENCOUNTER — OFFICE VISIT (OUTPATIENT)
Dept: FAMILY MEDICINE CLINIC | Facility: CLINIC | Age: 85
End: 2025-04-04
Payer: COMMERCIAL

## 2025-04-04 VITALS
RESPIRATION RATE: 16 BRPM | WEIGHT: 175.5 LBS | TEMPERATURE: 98.3 F | SYSTOLIC BLOOD PRESSURE: 128 MMHG | HEIGHT: 69 IN | BODY MASS INDEX: 26 KG/M2 | HEART RATE: 71 BPM | OXYGEN SATURATION: 97 % | DIASTOLIC BLOOD PRESSURE: 74 MMHG

## 2025-04-04 DIAGNOSIS — J06.9 ACUTE URI: Primary | ICD-10-CM

## 2025-04-04 LAB
SL AMB POCT RAPID FLU A: NORMAL
SL AMB POCT RAPID FLU B: NORMAL

## 2025-04-04 PROCEDURE — 99213 OFFICE O/P EST LOW 20 MIN: CPT

## 2025-04-04 PROCEDURE — 87804 INFLUENZA ASSAY W/OPTIC: CPT

## 2025-04-04 PROCEDURE — G2211 COMPLEX E/M VISIT ADD ON: HCPCS

## 2025-04-04 NOTE — PROGRESS NOTES
Name: Sajan Day      : 1940      MRN: [id number]  Encounter Provider: Tru Fisher DO  Encounter Date: 2025   Encounter department: [facility]  :  Assessment & Plan  Acute URI  Congestion, cough since Monday. Positive sick contact at choir practice. Feels better today than yesterday. Rapid flu negative. Home covid test negative . Likely viral, continue supportive care with warm fluids, honey, cough drops, mucinex dm BID, flonase daily, etc  ED/return precautions reviewed. Pt to call early next week if not improving or worsening sxs, will consider abx then.  Orders:    POCT rapid flu A and B           History of Present Illness   HPI    Chief Complaint   Patient presents with    cold      Started Monday   Coughing sneezing chest hurts took mux DM Tuesday nose was running sudafid low dose 4-6 hours last night was able to sleep   Fever 99.3 yesterday and 99.5 today   Took Covid test wednesday neg      Was taking Mucinex DM x1 and sudafed q4-6 hours with minimal improvement. Home COVID test negative 25. Took allergy with no relief. Positive sick contact from [employer] practice. Took tylenol last night. Slept better last night and better today than usual.     Has been using flonase.         Review of Systems   Constitutional:  Negative for chills and fever.   HENT:  Positive for congestion and rhinorrhea.    Eyes:  Negative for visual disturbance.   Respiratory:  Positive for cough. Negative for shortness of breath.    Cardiovascular:  Negative for chest pain and palpitations.   Gastrointestinal:  Negative for abdominal pain, constipation, diarrhea, nausea and vomiting.   Genitourinary:  Negative for dysuria.   Musculoskeletal:  Negative for arthralgias.   Skin:  Negative for rash.   Neurological:  Negative for dizziness, light-headedness and headaches.       Objective   /74 (BP Location: Left arm, Patient Position: Sitting, Cuff Size: Large)   Pulse 71   Temp 98.3 °F  "(36.8 °C) (Temporal)   Resp 16   Ht 5' 9\" (1.753 m)   Wt 79.6 kg (175 lb 8 oz)   SpO2 97%   BMI 25.92 kg/m²      Physical Exam  Vitals reviewed.   Constitutional:       General: He is not in acute distress.     Appearance: Normal appearance.   HENT:      Head: Normocephalic and atraumatic.      Comments: No TTP over maxillary or frontal sinuses     Right Ear: External ear normal.      Left Ear: External ear normal.      Nose: Congestion present.      Mouth/Throat:      Pharynx: Oropharynx is clear. No oropharyngeal exudate or posterior oropharyngeal erythema.   Eyes:      General:         Right eye: No discharge.         Left eye: No discharge.      Extraocular Movements: Extraocular movements intact.      Conjunctiva/sclera: Conjunctivae normal.   Cardiovascular:      Rate and Rhythm: Normal rate and regular rhythm.      Pulses: Normal pulses.      Heart sounds: Normal heart sounds.   Pulmonary:      Effort: Pulmonary effort is normal. No respiratory distress.      Breath sounds: Normal breath sounds. No wheezing, rhonchi or rales.   Abdominal:      Palpations: Abdomen is soft.   Musculoskeletal:      Right lower leg: No edema.      Left lower leg: No edema.   Skin:     General: Skin is warm.   Neurological:      Mental Status: He is alert and oriented to person, place, and time.   Psychiatric:         Mood and Affect: Mood normal.         Behavior: Behavior normal.         "

## 2025-04-23 ENCOUNTER — APPOINTMENT (OUTPATIENT)
Dept: LAB | Facility: CLINIC | Age: 85
End: 2025-04-23
Payer: COMMERCIAL

## 2025-04-23 DIAGNOSIS — E78.2 MIXED HYPERLIPIDEMIA: ICD-10-CM

## 2025-04-23 DIAGNOSIS — E03.8 SUBCLINICAL HYPOTHYROIDISM: ICD-10-CM

## 2025-04-23 DIAGNOSIS — N18.31 STAGE 3A CHRONIC KIDNEY DISEASE (HCC): ICD-10-CM

## 2025-04-23 LAB
ANION GAP SERPL CALCULATED.3IONS-SCNC: 11 MMOL/L (ref 4–13)
BUN SERPL-MCNC: 27 MG/DL (ref 5–25)
CALCIUM SERPL-MCNC: 9.3 MG/DL (ref 8.4–10.2)
CHLORIDE SERPL-SCNC: 106 MMOL/L (ref 96–108)
CHOLEST SERPL-MCNC: 132 MG/DL (ref ?–200)
CO2 SERPL-SCNC: 26 MMOL/L (ref 21–32)
CREAT SERPL-MCNC: 0.94 MG/DL (ref 0.6–1.3)
ERYTHROCYTE [DISTWIDTH] IN BLOOD BY AUTOMATED COUNT: 12.1 % (ref 11.6–15.1)
GFR SERPL CREATININE-BSD FRML MDRD: 74 ML/MIN/1.73SQ M
GLUCOSE P FAST SERPL-MCNC: 102 MG/DL (ref 65–99)
HCT VFR BLD AUTO: 44.4 % (ref 36.5–49.3)
HDLC SERPL-MCNC: 40 MG/DL
HGB BLD-MCNC: 14.8 G/DL (ref 12–17)
LDLC SERPL CALC-MCNC: 71 MG/DL (ref 0–100)
MCH RBC QN AUTO: 32.5 PG (ref 26.8–34.3)
MCHC RBC AUTO-ENTMCNC: 33.3 G/DL (ref 31.4–37.4)
MCV RBC AUTO: 97 FL (ref 82–98)
NONHDLC SERPL-MCNC: 92 MG/DL
PLATELET # BLD AUTO: 262 THOUSANDS/UL (ref 149–390)
PMV BLD AUTO: 11 FL (ref 8.9–12.7)
POTASSIUM SERPL-SCNC: 4 MMOL/L (ref 3.5–5.3)
RBC # BLD AUTO: 4.56 MILLION/UL (ref 3.88–5.62)
SODIUM SERPL-SCNC: 143 MMOL/L (ref 135–147)
TRIGL SERPL-MCNC: 107 MG/DL (ref ?–150)
TSH SERPL DL<=0.05 MIU/L-ACNC: 3.62 UIU/ML (ref 0.45–4.5)
WBC # BLD AUTO: 8.21 THOUSAND/UL (ref 4.31–10.16)

## 2025-04-23 PROCEDURE — 84443 ASSAY THYROID STIM HORMONE: CPT

## 2025-04-23 PROCEDURE — 36415 COLL VENOUS BLD VENIPUNCTURE: CPT

## 2025-04-23 PROCEDURE — 85027 COMPLETE CBC AUTOMATED: CPT

## 2025-04-23 PROCEDURE — 80061 LIPID PANEL: CPT

## 2025-04-23 PROCEDURE — 80048 BASIC METABOLIC PNL TOTAL CA: CPT

## 2025-05-06 ENCOUNTER — OFFICE VISIT (OUTPATIENT)
Dept: FAMILY MEDICINE CLINIC | Facility: CLINIC | Age: 85
End: 2025-05-06
Payer: COMMERCIAL

## 2025-05-06 VITALS
SYSTOLIC BLOOD PRESSURE: 132 MMHG | RESPIRATION RATE: 16 BRPM | HEART RATE: 58 BPM | DIASTOLIC BLOOD PRESSURE: 70 MMHG | BODY MASS INDEX: 26.58 KG/M2 | OXYGEN SATURATION: 97 % | TEMPERATURE: 98.3 F | WEIGHT: 180 LBS

## 2025-05-06 DIAGNOSIS — M18.12 ARTHRITIS OF CARPOMETACARPAL (CMC) JOINT OF LEFT THUMB: ICD-10-CM

## 2025-05-06 DIAGNOSIS — C61 PROSTATE CANCER (HCC): ICD-10-CM

## 2025-05-06 DIAGNOSIS — E78.2 MIXED HYPERLIPIDEMIA: ICD-10-CM

## 2025-05-06 DIAGNOSIS — I25.10 CORONARY ARTERY DISEASE INVOLVING NATIVE CORONARY ARTERY OF NATIVE HEART WITHOUT ANGINA PECTORIS: ICD-10-CM

## 2025-05-06 DIAGNOSIS — J30.1 SEASONAL ALLERGIC RHINITIS DUE TO POLLEN: ICD-10-CM

## 2025-05-06 DIAGNOSIS — E03.8 SUBCLINICAL HYPOTHYROIDISM: ICD-10-CM

## 2025-05-06 DIAGNOSIS — I10 ESSENTIAL HYPERTENSION: Primary | ICD-10-CM

## 2025-05-06 PROCEDURE — G2211 COMPLEX E/M VISIT ADD ON: HCPCS | Performed by: FAMILY MEDICINE

## 2025-05-06 PROCEDURE — 99214 OFFICE O/P EST MOD 30 MIN: CPT | Performed by: FAMILY MEDICINE

## 2025-05-06 RX ORDER — MONTELUKAST SODIUM 10 MG/1
10 TABLET ORAL
Qty: 90 TABLET | Refills: 3 | Status: SHIPPED | OUTPATIENT
Start: 2025-05-06

## 2025-05-06 NOTE — ASSESSMENT & PLAN NOTE
Remains on sotalol and montelukast.  Astepro as needed for seasonal allergies.  Orders:    montelukast (SINGULAIR) 10 mg tablet; Take 1 tablet (10 mg total) by mouth daily at bedtime

## 2025-05-06 NOTE — PROGRESS NOTES
Name: Sajan Day      : 1940      MRN: [id number]  Encounter Provider: Sheila Martinez MD  Encounter Date: 2025   Encounter department: [facility]  :  Assessment & Plan  Essential hypertension  BP Readings from Last 3 Encounters:   25 132/70   25 128/74   25 130/76     Stable. Continue current regimen       Orders:    Basic metabolic panel; Future    CBC; Future    Coronary artery disease involving native coronary artery of native heart without angina pectoris  Stable.  Mid LAD stent  for exertional angina.  Remains on aspirin 81 mg twice daily through cardiology.       Seasonal allergic rhinitis due to pollen  Remains on sotalol and montelukast.  Astepro as needed for seasonal allergies.  Orders:    montelukast (SINGULAIR) 10 mg tablet; Take 1 tablet (10 mg total) by mouth daily at bedtime    Arthritis of carpometacarpal (CMC) joint of left thumb  Continues to have pain left CMC.  Recommend follow-up with orthopedic surgery for injection.       Prostate cancer (HCC)         Mixed hyperlipidemia  Stable.  Continue to monitor  Orders:    Lipid panel; Future    Subclinical hypothyroidism  Noted on lab work.  Repeat TSH in 6 months.  Orders:    TSH, 3rd generation with Free T4 reflex; Future           History of Present Illness   g    Patient presents today for 6-month follow-up.  Overall doing well.  Kidney function has improved.  Struggling with seasonal allergies currently.  He is a refill of medications.  Continues to have pain in his left thumb.  Was seen by orthopedic surgery and diagnosed with CMC arthritis.  Did not receive an injection but was told to follow-up with pain worsen.  He fly fishes during the summer and has trouble doing this because of the thumb pain.  Recommend follow-up with orthopedic surgery    Medication Refill  Pertinent negatives include no abdominal pain, chest pain, fatigue, fever, headaches, nausea or rash.     Review of  Systems   Constitutional:  Negative for activity change, fatigue and fever.   Eyes:  Negative for visual disturbance.   Respiratory:  Negative for shortness of breath.    Cardiovascular:  Negative for chest pain.   Gastrointestinal:  Negative for abdominal pain, constipation, diarrhea and nausea.   Endocrine: Negative for cold intolerance and heat intolerance.   Musculoskeletal:  Negative for back pain.        Left thumb pain   Skin:  Negative for rash.   Neurological:  Negative for headaches.   Psychiatric/Behavioral:  Negative for confusion.        Objective   /70 (BP Location: Left arm, Patient Position: Sitting, Cuff Size: Large)   Pulse 58   Temp 98.3 °F (36.8 °C) (Temporal)   Resp 16   Wt 81.6 kg (180 lb)   SpO2 97%   BMI 26.58 kg/m²      Physical Exam  Vitals and nursing note reviewed.   Constitutional:       Appearance: Normal appearance. He is well-developed.   HENT:      Head: Normocephalic and atraumatic.   Cardiovascular:      Rate and Rhythm: Normal rate and regular rhythm.   Pulmonary:      Effort: Pulmonary effort is normal.      Breath sounds: Normal breath sounds.   Abdominal:      General: Bowel sounds are normal.      Palpations: Abdomen is soft.   Musculoskeletal:         General: Tenderness present.      Cervical back: Normal range of motion.   Skin:     General: Skin is warm.   Neurological:      General: No focal deficit present.      Mental Status: He is alert.   Psychiatric:         Mood and Affect: Mood normal.         Speech: Speech normal.

## 2025-05-06 NOTE — ASSESSMENT & PLAN NOTE
BP Readings from Last 3 Encounters:   05/06/25 132/70   04/04/25 128/74   01/28/25 130/76     Stable. Continue current regimen       Orders:    Basic metabolic panel; Future    CBC; Future

## 2025-05-06 NOTE — ASSESSMENT & PLAN NOTE
Stable.  Mid LAD stent 2002 for exertional angina.  Remains on aspirin 81 mg twice daily through cardiology.

## 2025-05-14 VITALS — WEIGHT: 180 LBS | BODY MASS INDEX: 26.66 KG/M2 | HEIGHT: 69 IN

## 2025-05-14 DIAGNOSIS — M18.12 ARTHRITIS OF CARPOMETACARPAL (CMC) JOINT OF LEFT THUMB: Primary | ICD-10-CM

## 2025-05-14 PROCEDURE — 99213 OFFICE O/P EST LOW 20 MIN: CPT | Performed by: SURGERY

## 2025-05-14 PROCEDURE — 20600 DRAIN/INJ JOINT/BURSA W/O US: CPT | Performed by: SURGERY

## 2025-05-14 RX ORDER — TRIAMCINOLONE ACETONIDE 40 MG/ML
20 INJECTION, SUSPENSION INTRA-ARTICULAR; INTRAMUSCULAR
Status: COMPLETED | OUTPATIENT
Start: 2025-05-14 | End: 2025-05-14

## 2025-05-14 RX ORDER — LIDOCAINE HYDROCHLORIDE 10 MG/ML
1 INJECTION, SOLUTION INFILTRATION; PERINEURAL
Status: COMPLETED | OUTPATIENT
Start: 2025-05-14 | End: 2025-05-14

## 2025-05-14 RX ADMIN — TRIAMCINOLONE ACETONIDE 20 MG: 40 INJECTION, SUSPENSION INTRA-ARTICULAR; INTRAMUSCULAR at 13:15

## 2025-05-14 RX ADMIN — LIDOCAINE HYDROCHLORIDE 1 ML: 10 INJECTION, SOLUTION INFILTRATION; PERINEURAL at 13:15

## 2025-05-14 NOTE — ASSESSMENT & PLAN NOTE
On exam, he has pain at the base of the left thumb with trouble with putting on his socks.   Discussed treatment options that includes: Discussed conservative treatments for the hands to manage her arthritis which includes:  Heating in the morning and evenings for 20 minutes each  Use of topical agents such as Voltaren gel 1%, which has an anti-inflammatory property.  Localized cortisone injection into the thumb CMC joint every 3 to 4 months as needed for pain discomfort.  Use of thumb Comfort Cool braces that is supportive and not restrictive that can be used with activity.   Encouraged to use heat more consistently.   We will follow up as needed

## 2025-05-14 NOTE — PROGRESS NOTES
ASSESSMENT/PLAN:      Assessment & Plan  Arthritis of carpometacarpal (CMC) joint of left thumb  On exam, he has pain at the base of the left thumb with trouble with putting on his socks.   Discussed treatment options that includes: Discussed conservative treatments for the hands to manage her arthritis which includes:  Heating in the morning and evenings for 20 minutes each  Use of topical agents such as Voltaren gel 1%, which has an anti-inflammatory property.  Localized cortisone injection into the thumb CMC joint every 3 to 4 months as needed for pain discomfort.  Use of thumb Comfort Cool braces that is supportive and not restrictive that can be used with activity.   Encouraged to use heat more consistently.   We will follow up as needed             The patient verbalized understanding of exam findings and treatment plan. We engaged in the shared decision-making process and treatment options were discussed at length with the patient. Surgical and conservative management discussed today along with risks and benefits.    Diagnoses and all orders for this visit:    Arthritis of carpometacarpal (CMC) joint of left thumb    Other orders  -     Small joint arthrocentesis      Follow Up:  Return in about 3 months (around 8/14/2025), or if symptoms worsen or fail to improve, for left thumb .      To Do Next Visit:  Re-evaluation of current issue    ____________________________________________________________________________________________________________________________________________      CHIEF COMPLAINT:  Chief Complaint   Patient presents with    Follow-up     Patient would like a left cmc injection        SUBJECTIVE:  Sajan Day is a 85 y.o. year old RHD male who presents today for his chronic left thumb CMC pain due to osteoarthritis. He has tried topical Volatren gel 1% with no benefit. He has tried Tylenol with good resolve. He hasn't tried heat. He is going fishing next week.        I have personally  reviewed all the relevant PMH, PSH, SH, FH, Medications and allergies.     PAST MEDICAL HISTORY:  Past Medical History:   Diagnosis Date    Allergic 1970    Cancer (HCC) 4/2009    Coronary artery disease     Disease of thyroid gland 2022    not clear what is going on    Erectile dysfunction 1/2010    Heart disease     Hypertension     see chart    Osteoarthritis 10/24    Postnasal drip 03/10/2021    Prostate cancer (HCC) 4/2009       PAST SURGICAL HISTORY:  Past Surgical History:   Procedure Laterality Date    CAROTID STENT  6/24/2009    HERNIA REPAIR      PROSTATECTOMY  6/24/2009       FAMILY HISTORY:  Family History   Problem Relation Age of Onset    Prostate cancer Father     Cancer Father        SOCIAL HISTORY:  Social History[1]    MEDICATIONS:  Current Medications[2]    ALLERGIES:  Allergies[3]    REVIEW OF SYSTEMS:  Review of Systems   Constitutional:  Negative for chills, fever and unexpected weight change.   HENT:  Negative for hearing loss, nosebleeds and sore throat.    Eyes:  Negative for pain, redness and visual disturbance.   Respiratory:  Negative for cough, shortness of breath and wheezing.    Cardiovascular:  Negative for chest pain, palpitations and leg swelling.   Gastrointestinal:  Negative for abdominal pain, nausea and vomiting.   Endocrine: Negative for polydipsia and polyuria.   Genitourinary:  Negative for dysuria and hematuria.   Musculoskeletal:  Positive for arthralgias.   Skin:  Negative for rash and wound.   Neurological:  Negative for dizziness, light-headedness and headaches.   Psychiatric/Behavioral:  Negative for decreased concentration, dysphoric mood and suicidal ideas. The patient is not nervous/anxious.        VITALS:  There were no vitals filed for this visit.      _____________________________________________________  PHYSICAL EXAMINATION:  General: well developed and well nourished, alert, oriented times 3, and appears comfortable  Psychiatric: Normal  HEENT: Normocephalic,  "Atraumatic Trachea Midline, No torticollis  Pulmonary: No audible wheezing or respiratory distress   Cardiovascular: No pitting edema, 2+ radial pulse   Abdominal/GI: abdomen non tender, non distended   Skin: No masses, erythema, lacerations, fluctation, ulcerations  Neurovascular: Sensation Intact to the Median, Ulnar, Radial Nerve, Motor Intact to the Median, Ulnar, Radial Nerve, and Pulses Intact  Musculoskeletal: Normal, except as noted in detailed exam and in HPI.      MUSCULOSKELETAL EXAMINATION:    left CMC Exam:  No adduction contracture  No hyperextension deformity of MCP joint  Positive localized tenderness over radial and dorsal aspect of thumb (CMC joint)  Grind test is Negative for pain and Negative for crepitus  Metacarpal load shift test Positive  No triggering or tenderness over the A1 pulley  Negative pain with Finkelstein’s maneuver       ___________________________________________________    STUDIES REVIEWED:  I have personally reviewed and interpreted  AP lateral and oblique radiographs of left wrist    which demonstrate left thumb cmc osteoarthritis.         LABS REVIEWED:    HgA1c:   Lab Results   Component Value Date    HGBA1C 5.8 (H) 10/22/2024     BMP:   Lab Results   Component Value Date    CALCIUM 9.3 04/23/2025    K 4.0 04/23/2025    CO2 26 04/23/2025     04/23/2025    BUN 27 (H) 04/23/2025    CREATININE 0.94 04/23/2025             PROCEDURES PERFORMED:  Small joint arthrocentesis: L thumb CMC    Performed by: Nael Tinajero MD  Authorized by: Nael Tinajero MD    [facility] Protocol:  Consent: Verbal consent obtained  Risks and benefits: risks, benefits and alternatives were discussed  Consent given by: patient  Time out: Immediately prior to procedure a \"time out\" was called to verify the correct patient, procedure, equipment, support staff and site/side marked as required.  Timeout called at: 5/14/2025 1:18 PM.  Patient understanding: patient states understanding of the " procedure being performed  Site marked: the operative site was marked  Patient identity confirmed: verbally with patient  Supporting Documentation  Indications: pain   Procedure Details  Location: thumb - L thumb CMC  Preparation: Patient was prepped and draped in the usual sterile fashion  Needle size: 25 G  Ultrasound guidance: no  Approach: volar  Medications administered: 1 mL lidocaine 1 %; 20 mg triamcinolone acetonide 40 mg/mL    Patient tolerance: patient tolerated the procedure well with no immediate complications  Dressing:  Sterile dressing applied        _____________________________________________________      Scribe Attestation      I,:  Alyssa Bush am acting as a scribe while in the presence of the attending physician.:       I,:  Nael Tinajero MD personally performed the services described in this documentation    as scribed in my presence.:                         [1]   Social History  Tobacco Use    Smoking status: Never    Smokeless tobacco: Never   Vaping Use    Vaping status: Never Used   Substance Use Topics    Alcohol use: Yes     Alcohol/week: 4.0 standard drinks of alcohol     Types: 4 Glasses of wine per week     Comment: 1 glass of red wine per day on advice of cardiologist    Drug use: Never   [2]   Current Outpatient Medications:     aspirin (ECOTRIN LOW STRENGTH) 81 mg EC tablet, Take 81 mg by mouth in the morning and 81 mg before bedtime., Disp: , Rfl:     atenolol (TENORMIN) 25 mg tablet, Take 1 tablet (25 mg total) by mouth daily, Disp: 90 tablet, Rfl: 1    azelastine (ASTELIN) 0.1 % nasal spray, 1 spray into each nostril 2 (two) times a day Use in each nostril as directed, Disp: 30 mL, Rfl: 0    Calcium Carbonate-Vit D-Min (CALCIUM 1200 PO), Take 1 capsule by mouth daily with breakfast, Disp: , Rfl:     cholecalciferol (VITAMIN D3) 1,000 units tablet, Take 1,000 Units by mouth in the morning and 1,000 Units before bedtime., Disp: , Rfl:     clobetasol (TEMOVATE) 0.05 %  external solution, , Disp: , Rfl:     co-enzyme Q-10 30 MG capsule, Take 30 mg by mouth in the morning and 30 mg in the evening and 30 mg before bedtime., Disp: , Rfl:     CRANBERRY PO, Take by mouth in the morning. To prevent gout., Disp: , Rfl:     diphenhydrAMINE (BENADRYL) 50 MG tablet, Take 50 mg by mouth daily at bedtime as needed for itching Sinus drainage/sleep aid, Disp: , Rfl:     glucosamine-chondroitin 500-400 MG tablet, Take 1 tablet by mouth in the morning and 1 tablet in the evening. Take with meals., Disp: , Rfl:     ketoconazole (NIZORAL) 2 % shampoo, , Disp: , Rfl:     Misc Natural Products (Tart Cherry Advanced) CAPS, Take 1 capsule by mouth daily with breakfast 1000 mg to prevent gout, Disp: , Rfl:     montelukast (SINGULAIR) 10 mg tablet, Take 1 tablet (10 mg total) by mouth daily at bedtime, Disp: 90 tablet, Rfl: 3    niacin 500 mg tablet, Take 500 mg by mouth in the morning and 500 mg before bedtime., Disp: , Rfl:     nitroglycerin (NITROSTAT) 0.4 mg SL tablet, Place 1 tablet (0.4 mg total) under the tongue every 5 (five) minutes as needed for chest pain, Disp: 30 tablet, Rfl: 1    rosuvastatin (CRESTOR) 5 mg tablet, Take 1 tablet (5 mg total) by mouth daily at bedtime, Disp: 90 tablet, Rfl: 1    Zinc 50 MG CAPS, Take 1 capsule by mouth in the morning., Disp: , Rfl:   [3]   Allergies  Allergen Reactions    Grass Extracts [Gramineae Pollens] Allergic Rhinitis

## 2025-05-28 DIAGNOSIS — I25.10 CORONARY ARTERY DISEASE INVOLVING NATIVE CORONARY ARTERY OF NATIVE HEART WITHOUT ANGINA PECTORIS: ICD-10-CM

## 2025-05-29 RX ORDER — NITROGLYCERIN 0.4 MG/1
0.4 TABLET SUBLINGUAL
Qty: 25 TABLET | Refills: 5 | Status: SHIPPED | OUTPATIENT
Start: 2025-05-29